# Patient Record
Sex: FEMALE | Race: WHITE | Employment: UNEMPLOYED | URBAN - METROPOLITAN AREA
[De-identification: names, ages, dates, MRNs, and addresses within clinical notes are randomized per-mention and may not be internally consistent; named-entity substitution may affect disease eponyms.]

---

## 2019-05-06 ENCOUNTER — HOSPITAL ENCOUNTER (OUTPATIENT)
Dept: GENERAL RADIOLOGY | Age: 54
Discharge: HOME | End: 2019-05-06
Attending: INTERNAL MEDICINE
Payer: MEDICARE

## 2019-05-06 DIAGNOSIS — M25.559 PAIN IN UNSPECIFIED HIP: ICD-10-CM

## 2019-05-06 DIAGNOSIS — R52 PAIN: ICD-10-CM

## 2019-05-06 PROCEDURE — 73630 X-RAY EXAM OF FOOT: CPT

## 2019-05-06 PROCEDURE — 73610 X-RAY EXAM OF ANKLE: CPT

## 2019-05-06 PROCEDURE — 73521 X-RAY EXAM HIPS BI 2 VIEWS: CPT

## 2019-05-06 PROCEDURE — 73562 X-RAY EXAM OF KNEE 3: CPT

## 2019-11-18 ENCOUNTER — HOSPITAL ENCOUNTER (OUTPATIENT)
Age: 54
Setting detail: OUTPATIENT SURGERY
Discharge: HOME | End: 2019-11-18
Attending: INTERNAL MEDICINE | Admitting: INTERNAL MEDICINE
Payer: MEDICARE

## 2019-11-18 ENCOUNTER — ANESTHESIA EVENT (OUTPATIENT)
Dept: ENDOSCOPY | Age: 54
End: 2019-11-18
Payer: MEDICARE

## 2019-11-18 ENCOUNTER — ANESTHESIA (OUTPATIENT)
Dept: ENDOSCOPY | Age: 54
End: 2019-11-18
Payer: MEDICARE

## 2019-11-18 VITALS
RESPIRATION RATE: 11 BRPM | DIASTOLIC BLOOD PRESSURE: 63 MMHG | HEIGHT: 64 IN | OXYGEN SATURATION: 100 % | BODY MASS INDEX: 17.77 KG/M2 | TEMPERATURE: 97.7 F | SYSTOLIC BLOOD PRESSURE: 109 MMHG | HEART RATE: 65 BPM | WEIGHT: 104.06 LBS

## 2019-11-18 PROCEDURE — 74011000250 HC RX REV CODE- 250: Performed by: ANESTHESIOLOGY

## 2019-11-18 PROCEDURE — 76040000019: Performed by: INTERNAL MEDICINE

## 2019-11-18 PROCEDURE — 74011250636 HC RX REV CODE- 250/636: Performed by: ANESTHESIOLOGY

## 2019-11-18 PROCEDURE — 74011250636 HC RX REV CODE- 250/636: Performed by: INTERNAL MEDICINE

## 2019-11-18 PROCEDURE — 76060000031 HC ANESTHESIA FIRST 0.5 HR: Performed by: INTERNAL MEDICINE

## 2019-11-18 RX ORDER — DIAZEPAM 10 MG/2ML
INJECTION INTRAMUSCULAR ONCE
COMMUNITY
End: 2020-06-08 | Stop reason: SDUPTHER

## 2019-11-18 RX ORDER — ACETAMINOPHEN 325 MG/1
325 TABLET ORAL
COMMUNITY
End: 2021-01-08 | Stop reason: SDUPTHER

## 2019-11-18 RX ORDER — SODIUM CHLORIDE 0.9 % (FLUSH) 0.9 %
5-40 SYRINGE (ML) INJECTION AS NEEDED
Status: DISCONTINUED | OUTPATIENT
Start: 2019-11-18 | End: 2019-11-18 | Stop reason: HOSPADM

## 2019-11-18 RX ORDER — EPINEPHRINE 0.1 MG/ML
1 INJECTION INTRACARDIAC; INTRAVENOUS
Status: DISCONTINUED | OUTPATIENT
Start: 2019-11-18 | End: 2019-11-18 | Stop reason: HOSPADM

## 2019-11-18 RX ORDER — DIAZEPAM 5 MG/1
5 TABLET ORAL
COMMUNITY

## 2019-11-18 RX ORDER — EPINEPHRINE 0.3 MG/.3ML
0.3 INJECTION SUBCUTANEOUS
COMMUNITY
End: 2021-01-08 | Stop reason: SDUPTHER

## 2019-11-18 RX ORDER — NALOXONE HYDROCHLORIDE 0.4 MG/ML
0.4 INJECTION, SOLUTION INTRAMUSCULAR; INTRAVENOUS; SUBCUTANEOUS
Status: DISCONTINUED | OUTPATIENT
Start: 2019-11-18 | End: 2019-11-18 | Stop reason: HOSPADM

## 2019-11-18 RX ORDER — PSYLLIUM HUSK 0.4 G
0.4 CAPSULE ORAL
COMMUNITY
End: 2021-03-23 | Stop reason: SDUPTHER

## 2019-11-18 RX ORDER — BISMUTH SUBSALICYLATE 262 MG
1 TABLET,CHEWABLE ORAL DAILY
COMMUNITY
End: 2021-01-08 | Stop reason: SDUPTHER

## 2019-11-18 RX ORDER — GABAPENTIN 600 MG/1
600 TABLET ORAL 3 TIMES DAILY
COMMUNITY

## 2019-11-18 RX ORDER — KETOCONAZOLE 20 MG/ML
SHAMPOO TOPICAL
COMMUNITY

## 2019-11-18 RX ORDER — DEXTROMETHORPHAN/PSEUDOEPHED 2.5-7.5/.8
1.2 DROPS ORAL
Status: DISCONTINUED | OUTPATIENT
Start: 2019-11-18 | End: 2019-11-18 | Stop reason: HOSPADM

## 2019-11-18 RX ORDER — LIDOCAINE HYDROCHLORIDE 20 MG/ML
INJECTION, SOLUTION EPIDURAL; INFILTRATION; INTRACAUDAL; PERINEURAL AS NEEDED
Status: DISCONTINUED | OUTPATIENT
Start: 2019-11-18 | End: 2019-11-18 | Stop reason: HOSPADM

## 2019-11-18 RX ORDER — POLYETHYLENE GLYCOL 3350 17 G/17G
17 POWDER, FOR SOLUTION ORAL DAILY
COMMUNITY

## 2019-11-18 RX ORDER — FLUTICASONE PROPIONATE 50 MCG
2 SPRAY, SUSPENSION (ML) NASAL DAILY
COMMUNITY
End: 2021-01-08 | Stop reason: SDUPTHER

## 2019-11-18 RX ORDER — ATROPINE SULFATE 0.1 MG/ML
0.5 INJECTION INTRAVENOUS
Status: DISCONTINUED | OUTPATIENT
Start: 2019-11-18 | End: 2019-11-18 | Stop reason: HOSPADM

## 2019-11-18 RX ORDER — HYDROXYZINE 25 MG/1
TABLET, FILM COATED ORAL
COMMUNITY

## 2019-11-18 RX ORDER — MIDODRINE HYDROCHLORIDE 10 MG/1
10 TABLET ORAL 2 TIMES DAILY WITH MEALS
COMMUNITY
End: 2019-12-20

## 2019-11-18 RX ORDER — SODIUM CHLORIDE 0.9 % (FLUSH) 0.9 %
5-40 SYRINGE (ML) INJECTION EVERY 8 HOURS
Status: DISCONTINUED | OUTPATIENT
Start: 2019-11-18 | End: 2019-11-18 | Stop reason: HOSPADM

## 2019-11-18 RX ORDER — LANOLIN ALCOHOL/MO/W.PET/CERES
3 CREAM (GRAM) TOPICAL
COMMUNITY
End: 2021-01-08 | Stop reason: SDUPTHER

## 2019-11-18 RX ORDER — DOCUSATE SODIUM 100 MG/1
100 CAPSULE, LIQUID FILLED ORAL
COMMUNITY

## 2019-11-18 RX ORDER — SODIUM CHLORIDE 9 MG/ML
25 INJECTION, SOLUTION INTRAVENOUS CONTINUOUS
Status: DISCONTINUED | OUTPATIENT
Start: 2019-11-18 | End: 2019-11-18 | Stop reason: HOSPADM

## 2019-11-18 RX ORDER — MAGNESIUM CITRATE
150 SOLUTION, ORAL ORAL AS NEEDED
COMMUNITY

## 2019-11-18 RX ORDER — DIAZEPAM 10 MG/2ML
10 GEL RECTAL AS NEEDED
COMMUNITY

## 2019-11-18 RX ORDER — PROPOFOL 10 MG/ML
INJECTION, EMULSION INTRAVENOUS AS NEEDED
Status: DISCONTINUED | OUTPATIENT
Start: 2019-11-18 | End: 2019-11-18 | Stop reason: HOSPADM

## 2019-11-18 RX ORDER — FACIAL-BODY WIPES
10 EACH TOPICAL
COMMUNITY
End: 2021-01-08 | Stop reason: SDUPTHER

## 2019-11-18 RX ORDER — FLUMAZENIL 0.1 MG/ML
0.2 INJECTION INTRAVENOUS
Status: DISCONTINUED | OUTPATIENT
Start: 2019-11-18 | End: 2019-11-18 | Stop reason: HOSPADM

## 2019-11-18 RX ORDER — HYDROCORTISONE 25 MG/G
OINTMENT TOPICAL 2 TIMES DAILY
COMMUNITY
End: 2021-01-08 | Stop reason: SDUPTHER

## 2019-11-18 RX ADMIN — PROPOFOL 60 MG: 10 INJECTION, EMULSION INTRAVENOUS at 10:42

## 2019-11-18 RX ADMIN — LIDOCAINE HYDROCHLORIDE 20 MG: 20 INJECTION, SOLUTION EPIDURAL; INFILTRATION; INTRACAUDAL; PERINEURAL at 10:30

## 2019-11-18 RX ADMIN — SODIUM CHLORIDE 25 ML/HR: 900 INJECTION, SOLUTION INTRAVENOUS at 10:15

## 2019-11-18 NOTE — ANESTHESIA POSTPROCEDURE EVALUATION
Procedure(s):  COLONOSCOPY.    total IV anesthesia    Anesthesia Post Evaluation        Patient location during evaluation: PACU  Note status: Adequate. Level of consciousness: responsive to verbal stimuli and sleepy but conscious  Pain management: satisfactory to patient  Airway patency: patent  Anesthetic complications: no  Cardiovascular status: acceptable  Respiratory status: acceptable  Hydration status: acceptable  Comments: +Post-Anesthesia Evaluation and Assessment    Patient: Ilda Hudson MRN: [id number]  SSN: [id number]   YOB: 1965  Age: 48 y.o. Sex: female      Cardiovascular Function/Vital Signs    /63   Pulse 65   Temp 36.5 °C (97.7 °F)   Resp 11   Ht 5' 4\" (1.626 m)   Wt 47.2 kg (104 lb 0.9 oz)   SpO2 100%   Breastfeeding? No   BMI 17.86 kg/m²     Patient is status post Procedure(s):  COLONOSCOPY. Nausea/Vomiting: Controlled. Postoperative hydration reviewed and adequate. Pain:  Pain Scale 1: Visual (11/18/19 1104)  Pain Intensity 1: 0 (11/18/19 1104)   Managed. Neurological Status: At baseline. Mental Status and Level of Consciousness: Arousable. Pulmonary Status:   O2 Device: Room air (11/18/19 1104)   Adequate oxygenation and airway patent. Complications related to anesthesia: None    Post-anesthesia assessment completed. No concerns. Signed By: Sarah Edwards DO    11/18/2019  Post anesthesia nausea and vomiting:  controlled      Vitals Value Taken Time   /63 11/18/2019 11:04 AM   Temp 36.5 °C (97.7 °F) 11/18/2019 10:59 AM   Pulse 0 11/18/2019 11:07 AM   Resp 0 11/18/2019 11:07 AM   SpO2 100 % 11/18/2019 11:06 AM   Vitals shown include unvalidated device data.

## 2019-11-18 NOTE — PROCEDURES
NAME:  Paulino Sanchez   :   1965   MRN:   [id number]     Date/Time:  2019 10:40 AM    Colonoscopy Operative Report    Procedure Type:  Colonoscopy --incomplete    Indications: change in bowel habits, constipation  Pre-operative Diagnosis: see indication above  Post-operative Diagnosis:  See findings below  :  Ashleigh Arevalo MD  Referring Provider: Fide Werner MD    Exam:  Airway: clear, no airway problems anticipated  Heart: RRR, without gallops or rubs  Lungs: clear bilaterally without wheezes, crackles, or rhonchi  Abdomen: soft, nontender, nondistended, bowel sounds present  Mental Status: awake, alert and oriented to person, place and time    Sedation:  MAC anesthesia Propofol  Procedure Details:  After informed consent was obtained with all risks and benefits of procedure explained and preoperative exam completed, the patient was taken to the endoscopy suite and placed in the left lateral decubitus position. Upon sequential sedation as per above, a digital rectal exam was performed demonstrating internal hemorrhoids. The Olympus videocolonoscope  was inserted in the rectum and carefully advanced to the rectum. The quality of preparation was poor. The colonoscope was slowly withdrawn with careful evaluation between folds. Retroflexion in the rectum was completed demonstrating internal hemorrhoids. Findings:   ANUS: Anal exam reveals no masses or hemorrhoids, sphincter tone is normal. Copious thick brown stool on glove and in diaper. RECTUM: thick brown and solid stool coating and within lumen of rectum. Procedure aborted. SIGMOID COLON: not examined    Specimen Removed:  none  Complications:  None. EBL:     None. Impression:  -- incomplete procedure, solid stool still present throughout. Recommendations:   -- repeat colonoscopy with extended bowel prep at next available    Discharge Disposition:  Home in the company of a  when able to ambulate.       Analy Swanson MD

## 2019-11-18 NOTE — H&P
Gastroenterology Outpatient History and Physical    Patient: Desmond Mcclain    Physician: Arin Robledo MD    Chief Complaint: change in bowel habits  History of Present Illness: 47 yo F with constipation, worsening for a few years. No prior colonoscopy. History:  Past Medical History:   Diagnosis Date    Constipation     Hypotension     Psychomotor retardation     severe    Seizure disorder ([facility]     History reviewed. No pertinent surgical history. Social History     Socioeconomic History    Marital status: UNKNOWN     Spouse name: Not on file    Number of children: Not on file    Years of education: Not on file    Highest education level: Not on file    History reviewed. No pertinent family history. Patient Active Problem List   Diagnosis Code    Seizure disorder ([facility] G40.909    Hypotension I95.9    Constipation K59.00    Psychomotor retardation F45.9       Allergies: Allergies   Allergen Reactions    Bee Sting [Sting, Bee] Anaphylaxis     Medications:   Prior to Admission medications    Medication Sig Start Date End Date Taking? Authorizing Provider   divalproex sodium (DIVALPROEX PO) Take 250 mg by mouth three (3) times daily (with meals). Yes Provider, Historical   gabapentin (NEURONTIN) 600 mg tablet Take 600 mg by mouth three (3) times daily. Indications: 2 tab by mouth   Yes Provider, Historical   midodrine (PROAMITINE) 10 mg tablet Take 10 mg by mouth two (2) times daily (with meals). Yes Provider, Historical   multivitamin (ONE A DAY) tablet Take 1 Tab by mouth daily. Yes Provider, Historical   hydrocortisone (HYTONE) 2.5 % ointment Apply  to affected area two (2) times a day. use thin layer   Indications: To face    Provider, Historical   polyethylene glycol (MIRALAX) 17 gram packet Take 17 g by mouth daily. Provider, Historical   psyllium husk (METAMUCIL) 0.4 gram cap Take 0.4 g by mouth.     Provider, Historical   acetaminophen (TYLENOL) 325 mg tablet Take 325 mg by mouth every six (6) hours as needed for Pain. Provider, Historical   bisacodyl (DULCOLAX) 10 mg suppository Insert 10 mg into rectum daily as needed. Provider, Historical   diazePAM (VALIUM) 5 mg/mL injection by IntraMUSCular route once. Provider, Historical   diazePAM (VALIUM) 5-7.5-10 mg kit Insert 10 mg into rectum as needed (As needed for seizure lasting 3 minutes or 2 within 30 minutes of each other). Provider, Historical   diazePAM (VALIUM) 5 mg tablet Take 5 mg by mouth every six (6) hours as needed for Anxiety (PRN for procedures). Provider, Historical   docusate sodium (COLACE) 100 mg capsule Take 100 mg by mouth two (2) times daily as needed for Constipation. Provider, Historical   EPINEPHrine (EPIPEN) 0.3 mg/0.3 mL injection 0.3 mg by IntraMUSCular route once as needed for Allergic Response. Provider, Historical   fluticasone propionate (FLONASE) 50 mcg/actuation nasal spray 2 Sprays by Both Nostrils route daily. Indications: inflammation of the nose due to an allergy    Provider, Historical   hydrOXYzine HCl (ATARAX) 25 mg tablet Take  by mouth every six (6) hours as needed for Itching. Provider, Historical   ketoconazole (NIZORAL) 2 % shampoo Apply  to affected area three (3) days a week. Provider, Historical   magnesium citrate solution Take 150 mL by mouth as needed. Provider, Historical   melatonin 3 mg tablet Take 3 mg by mouth nightly as needed. Provider, Historical   dextromethorphan HBr (ROBAFEN COUGH PO) Take 5 mL by mouth. Indications: for cough    Provider, Historical     Physical Exam:   Vital Signs: Blood pressure 98/68, pulse 65, temperature 97 °F (36.1 °C), resp. rate 11, height 5' 4\" (1.626 m), weight 47.2 kg (104 lb 0.9 oz), SpO2 98 %, not currently breastfeeding.   General: well developed, well nourished   HEENT: unremarkable   Heart: regular rhythm no mumur    Lungs: clear   Abdominal:  benign   Neurological: unremarkable   Extremities: no edema Findings/Diagnosis: change in bowel habits, constipation  Plan of Care/Planned Procedure: Colonoscopy with conscious/deep sedation    Signed:  Etsefany Sterling MD 11/18/2019

## 2019-11-18 NOTE — DISCHARGE INSTRUCTIONS
Ilda Hudson  [id number]  1965    COLON DISCHARGE INSTRUCTIONS  Discomfort:  Redness at IV site- apply warm compress to area; if redness or soreness persist- contact your physician  There may be a slight amount of blood passed from the rectum  Gaseous discomfort- walking, belching will help relieve any discomfort  You may not operate a vehicle for 12 hours  You may not engage in an occupation involving machinery or appliances for rest of today  You may not drink alcoholic beverages for at least 12 hours  Avoid making any critical decisions for at least 24 hour  DIET:   High fiber diet. - however -  remember your colon is empty and a heavy meal will produce gas. Avoid these foods:  vegetables, fried / greasy foods, carbonated drinks for today  MEDICATION:  (See attached)     ACTIVITY:  You may not resume your normal daily activities until tomorrow AM; it is recommended that you spend the remainder of the day resting -  avoid any strenuous activity. CALL M.D. ANY SIGN OF:   Increasing pain, nausea, vomiting  Abdominal distension (swelling)  New increased bleeding (oral or rectal)  Fever (chills)    IMPRESSION:  -- unfortunately, we could not complete the testing, as the bowel prep was very poor. -- There was solid stool throughout the rectum, making any visualization impossible.   -- we'll have to try again with an extended, multi-day bowel prep    Follow-up Instructions:   Call Dr. Salvatore Kim Telephone # [phone]  Repeat colonoscopy at next available time    Lorena Rios MD

## 2019-11-18 NOTE — ANESTHESIA PREPROCEDURE EVALUATION
Relevant Problems   No relevant active problems       Anesthetic History   No history of anesthetic complications            Review of Systems / Medical History  Patient summary reviewed, nursing notes reviewed and pertinent labs reviewed    Pulmonary  Within defined limits                 Neuro/Psych     seizures: well controlled         Cardiovascular  Within defined limits                Exercise tolerance: <4 METS  Comments: Unable to walk without assistance, nonverbal, does not follow commands   GI/Hepatic/Renal  Within defined limits              Endo/Other  Within defined limits           Other Findings   Comments: Psychomotor retardation          Physical Exam    Airway    TM Distance: 4 - 6 cm        Comments: Pt has MR, unable to follow commands, nonverbal.  Could not evaluate airway Cardiovascular  Regular rate and rhythm,  S1 and S2 normal,  no murmur, click, rub, or gallop             Dental      Comments: Caregiver reports no loose teeth, dentures or partials   Pulmonary      Decreased breath sounds: bilateral          Comments: Low lung volumes Abdominal  GI exam deferred       Other Findings            Anesthetic Plan    ASA: 3  Anesthesia type: total IV anesthesia          Induction: Intravenous  Anesthetic plan and risks discussed with: Patient

## 2019-11-18 NOTE — PROGRESS NOTES
Anesthesia reports 60mg Propofol, 20mg Lidocaine and 300mL NS given during procedure. Received report from anesthesia staff on vital signs and status of patient.

## 2020-03-20 ENCOUNTER — HOSPITAL ENCOUNTER (EMERGENCY)
Age: 55
Discharge: HOME | End: 2020-03-20
Attending: EMERGENCY MEDICINE
Payer: MEDICARE

## 2020-03-20 VITALS
HEART RATE: 99 BPM | SYSTOLIC BLOOD PRESSURE: 99 MMHG | OXYGEN SATURATION: 98 % | WEIGHT: 106.26 LBS | TEMPERATURE: 97.9 F | BODY MASS INDEX: 18.24 KG/M2 | RESPIRATION RATE: 20 BRPM | DIASTOLIC BLOOD PRESSURE: 82 MMHG

## 2020-03-20 DIAGNOSIS — S01.81XA CHIN LACERATION, INITIAL ENCOUNTER: Primary | ICD-10-CM

## 2020-03-20 PROCEDURE — 74011250637 HC RX REV CODE- 250/637: Performed by: PHYSICIAN ASSISTANT

## 2020-03-20 PROCEDURE — 75810000293 HC SIMP/SUPERF WND  RPR

## 2020-03-20 PROCEDURE — 99283 EMERGENCY DEPT VISIT LOW MDM: CPT

## 2020-03-20 RX ORDER — ACETAMINOPHEN 325 MG/1
650 TABLET ORAL
Status: COMPLETED | OUTPATIENT
Start: 2020-03-20 | End: 2020-03-20

## 2020-03-20 RX ADMIN — ACETAMINOPHEN 650 MG: 325 TABLET ORAL at 13:46

## 2020-03-20 NOTE — ED NOTES
The patient was discharged home by Dr Iris Zuleta in stable condition. The patient is alert, in no respiratory distress. The patient's diagnosis, condition and treatment were explained. The patient expressed understanding. A discharge plan has been developed. Aftercare instructions were given.  Pt discharged from the ED via w/c by home care staff member

## 2020-03-20 NOTE — ED PROVIDER NOTES
Pt is a 51 y/o female presenting with a laceration on her chin following a witnessed ground-level fall from seated in a chair. She fell between 6:00-7:00 this morning. Pt has psychomotor retardation and a caregiver is present to assist with pt history. Caregiver states that the pt falls frequently due to difficulty with coordinated movement. Denies any recent illness or general change from baseline. Bleeding was controlled fairly quickly. Caregiver states the pt is expressing pain at this time but level is not able to assessed. Past Medical History:   Diagnosis Date    Constipation     Hypotension     Psychomotor retardation     severe    Seizure disorder [facility])        Past Surgical History:   Procedure Laterality Date    COLONOSCOPY N/A 11/18/2019    COLONOSCOPY performed by Baltazar Mayers MD at [facility] ENDOSCOPY    [facility]  11/18/2019              No family history on file.     Social History     Socioeconomic History    Marital status: UNKNOWN     Spouse name: Not on file    Number of children: Not on file    Years of education: Not on file    Highest education level: Not on file   Occupational History    Not on file   Social Needs    Financial resource strain: Not on file    Food insecurity     Worry: Not on file     Inability: Not on file    Transportation needs     Medical: Not on file     Non-medical: Not on file   Tobacco Use    Smoking status: Never Smoker    Smokeless tobacco: Never Used   Substance and Sexual Activity    Alcohol use: Never     Frequency: Never    Drug use: Never    Sexual activity: Not on file   Lifestyle    Physical activity     Days per week: Not on file     Minutes per session: Not on file    Stress: Not on file   Relationships    Social connections     Talks on phone: Not on file     Gets together: Not on file     Attends [employer] service: Not on file     Active member of club or organization: Not on file     Attends meetings of clubs or organizations: Not on file     Relationship status: Not on file    Intimate partner violence     Fear of current or ex partner: Not on file     Emotionally abused: Not on file     Physically abused: Not on file     Forced sexual activity: Not on file   Other Topics Concern    Not on file   Social History Narrative    Not on file         ALLERGIES: Bee sting [sting, bee]    Review of Systems   Unable to perform ROS: Patient nonverbal       Vitals:    03/20/20 1311   BP: 99/82   Pulse: 99   Resp: 20   Temp: 97.9 °F (36.6 °C)   SpO2: 98%   Weight: 48.2 kg (106 lb 4.2 oz)            Physical Exam  Vitals signs and nursing note reviewed. Constitutional:       General: She is not in acute distress. Appearance: She is not ill-appearing. HENT:      Head: Normocephalic. Nose: No rhinorrhea. Mouth/Throat:      Mouth: Mucous membranes are moist.      Pharynx: Oropharynx is clear. No posterior oropharyngeal erythema. Eyes:      Pupils: Pupils are equal, round, and reactive to light. Neck:      Musculoskeletal: Normal range of motion. Cardiovascular:      Rate and Rhythm: Normal rate and regular rhythm. Heart sounds: Normal heart sounds. Pulmonary:      Effort: Pulmonary effort is normal.      Breath sounds: Normal breath sounds. No wheezing. Abdominal:      General: There is no distension. Palpations: Abdomen is soft. Skin:     General: Skin is warm. Capillary Refill: Capillary refill takes less than 2 seconds. Findings: Signs of injury present. No erythema or rash. Neurological:      Mental Status: She is alert. Psychiatric:         Behavior: Behavior is cooperative.           MDM  Number of Diagnoses or Management Options  Chin laceration, initial encounter: new and does not require workup  Patient Progress  Patient progress: stable     Pt with simple chin laceration following a witnessed ground-level fall at healthcare facility which are reported to happen frequently. Behavior before and after falling is at baseline according to caregiver. No focal neurologic findings. Laceration closed with adhesive. Discussed with caregiver the indications for further treatment such as signs of infection, poor healing. Instructed to followup with PCP for wound check in 1 week. Wound Closure by Adhesive  Date/Time: 3/20/2020 4:25 PM  Performed by: Jeimy Mai PA-C  Authorized by: Jeimy Mai PA-C     Consent:     Consent obtained:  Verbal (by caregiver)    Consent given by:  Healthcare agent    Risks discussed:  Infection, pain, poor cosmetic result and poor wound healing  Anesthesia (see MAR for exact dosages): Anesthesia method:  None  Laceration details:     Location:  Face    Face location:  Chin    Length (cm):  2    Depth (mm):  4  Repair type:     Repair type:  Simple  Exploration:     Hemostasis achieved with:  Direct pressure    Wound exploration: entire depth of wound probed and visualized      Contaminated: no    Treatment:     Area cleansed with:  Saline    Amount of cleaning:  Standard    Irrigation solution:  Sterile saline    Irrigation volume:  200    Irrigation method:  Pressure wash  Skin repair:     Repair method:  Tissue adhesive  Approximation:     Approximation:  Close  Post-procedure details:     Dressing:  Open (no dressing)    Patient tolerance of procedure:   Tolerated well, no immediate complications          Ankush Hicks PA-C  3/20/2020

## 2020-03-20 NOTE — DISCHARGE INSTRUCTIONS
Patient Education        Cuts Closed With Adhesives: Care Instructions  Your Care Instructions  A cut can happen anywhere on your body. The doctor used an adhesive to close the cut. When the adhesive dries, it forms a film that holds the edges of the cut together. Skin adhesives are sometimes called liquid stitches. If the cut went deep and through the skin, the doctor may have put in a layer of stitches below the adhesive. The deeper layer of stitches brings the deep part of the cut together. These stitches will dissolve and don't need to be removed. You don't see the stitches, only the adhesive. You may have a bandage. The doctor has checked you carefully, but problems can develop later. If you notice any problems or new symptoms, get medical treatment right away. Follow-up care is a key part of your treatment and safety. Be sure to make and go to all appointments, and call your doctor if you are having problems. It's also a good idea to know your test results and keep a list of the medicines you take. How can you care for yourself at home? · Keep the cut dry for the first 24 to 48 hours. After this, you can shower if your doctor okays it. Pat the cut dry. · Don't soak the cut, such as in a bathtub. Your doctor will tell you when it's safe to get the cut wet. · If your doctor told you how to care for your cut, follow your doctor's instructions. If you did not get instructions, follow this general advice:  ? Do not put any kind of ointment, cream, or lotion over the area. This can make the adhesive fall off too soon. ? After the first 24 to 48 hours, wash around the cut with clean water 2 times a day. Do not use hydrogen peroxide or alcohol, which can slow healing. ? If the doctor told you to use a bandage, put on a new bandage after cleaning the cut or if the bandage gets wet or dirty. · Prop up the sore area on a pillow anytime you sit or lie down during the next 3 days.  Try to keep it above the level of your heart. This will help reduce swelling. · Leave the skin adhesive on your skin until it falls off on its own. This may take 5 to 10 days. · Do not scratch, rub, or pick at the adhesive. · Do not put the sticky part of a bandage directly on the adhesive. · Avoid any activity that could cause your cut to reopen. · Be safe with medicines. Read and follow all instructions on the label. ? If the doctor gave you a prescription medicine for pain, take it as prescribed. ? If you are not taking a prescription pain medicine, ask your doctor if you can take an over-the-counter medicine. When should you call for help? Call your doctor now or seek immediate medical care if:    · You have new pain, or your pain gets worse.     · The skin near the cut is cold or pale or changes color.     · You have tingling, weakness, or numbness near the cut.     · The cut starts to bleed.     · You have trouble moving the area near the cut.     · You have symptoms of infection, such as:  ? Increased pain, swelling, warmth, or redness around the cut.  ? Red streaks leading from the cut.  ? Pus draining from the cut.  ? A fever.    Watch closely for changes in your health, and be sure to contact your doctor if:    · The cut reopens.     · You do not get better as expected. Where can you learn more? Go to http://viridiana-pallavi.info/  Enter P174 in the search box to learn more about \"Cuts Closed With Adhesives: Care Instructions. \"  Current as of: June 26, 2019Content Version: 12.4  © 7798-6353 [employer]. Care instructions adapted under license by [employer] (which disclaims liability or warranty for this information). If you have questions about a medical condition or this instruction, always ask your healthcare professional. [address] any warranty or liability for your use of this information.

## 2021-02-16 ENCOUNTER — HOSPITAL ENCOUNTER (OUTPATIENT)
Dept: GENERAL RADIOLOGY | Age: 56
Discharge: HOME | End: 2021-02-16
Attending: INTERNAL MEDICINE
Payer: MEDICARE

## 2021-02-16 DIAGNOSIS — R13.10 DYSPHAGIA, UNSPECIFIED TYPE: ICD-10-CM

## 2021-02-16 DIAGNOSIS — R13.19 OTHER DYSPHAGIA: ICD-10-CM

## 2021-02-16 PROCEDURE — 74230 X-RAY XM SWLNG FUNCJ C+: CPT

## 2021-02-16 PROCEDURE — 92611 MOTION FLUOROSCOPY/SWALLOW: CPT | Performed by: SPEECH-LANGUAGE PATHOLOGIST

## 2021-02-16 NOTE — PROGRESS NOTES
[address] STUDY  Patient: Gilbert Chaney (42 y.o. female)  Date: 2/16/2021  Referring Provider:  Dr. Sarahi Benson:   Patient alert, cooperative. Brought by caregiver from her group home who reports she eats rapidly. Is on a mechanical soft diet and facility is trying to decide what is the safest texture for her to be on. Does not specifically note coughing with intake. OBJECTIVE:   Past Medical History:   Past Medical History:   Diagnosis Date    Constipation     Hypotension     Psychomotor retardation     severe    Seizure disorder [facility])      Past Surgical History:   Procedure Laterality Date    COLONOSCOPY N/A 11/18/2019    COLONOSCOPY performed by Gladys Singh MD at [facility] ENDOSCOPY    [facility]  11/18/2019          Current Dietary Status:  [facility] soft/thin   Radiologist: Dr. Harrison Natter: Lateral;Fluoro  Patient Position: upright in hausted chair     Trial 1: Trial 2:   Consistency Presented: Thin liquid; Nectar thick liquid Consistency Presented: Honey thick liquid;Puree; Solid   How Presented: Self-fed/presented;Cup/gulp; Successive swallows How Presented: Self-fed/presented;SLP-fed/presented;Cup/gulp; Successive swallows;Spoon   Consistency Amount: (90cc thin Ba, 30cc nectar thick Ba) Consistency Amount: (150cc honey thick Ba, 1 tbsp Ba paste )   Bolus Acceptance: Impaired(significantly impulsive) Bolus Acceptance: Impaired(severely impulsive)   Bolus Formation/Control: Impaired: Anterior;Spillage;Premature spillage;Piecemeal Bolus Formation/Control: Impaired: Incomplete;Mastication;Delayed; Anterior;Spillage;Premature spillage   Propulsion: Delayed (# of seconds); Discoordination Propulsion: Delayed (# of seconds); Discoordination   Oral Residue: Lingual Oral Residue: Lingual   Initiation of Swallow: Triggered at pyriform sinus(es) Initiation of Swallow: Triggered at valleculae;Triggered at pyriform sinus(es)   Timing: Pooling 1-5 sec Timing: Pooling 1-5 sec   Penetration: Before swallow;During swallow; To cords Penetration: None   Aspiration/Timing: Silent ;Repeated;During;From initial swallow(large, reji amount ) Aspiration/Timing: No evidence of aspiration   Pharyngeal Clearance: Vallecular residue;Pyriform residue ; Less than 10% Pharyngeal Clearance: Vallecular residue;Pyriform residue ; Less than 10%   Attempted Modifications: Small sips and bites Attempted Modifications: Small sips and bites   Effective Modifications: None Effective Modifications: (small bites/sips improved safety )   Cues for Modifications: Maximal Cues for Modifications: Maximum         Decreased Tongue Base Retraction?: Yes  Laryngeal Elevation: Incomplete laryngeal closure; Inadequate epiglottic inversion; Reduced excursion with laryngeal vestibule gap  Aspiration/Penetration Score: 8 (Aspiration-Contrast passes cords/glottis with no effort to eject, ie/silent aspiration)  Pharyngeal Symmetry: Not assessed  Pharyngeal-Esophageal Segment: No impairment  Pharyngeal Dysfunction: Decreased tongue base retraction;Decreased strength;Decreased elevation/closure    Oral Phase Severity: Moderate  Pharyngeal Phase Severity: Moderate    ASSESSMENT :  Based on the objective data described above, the patient presents with moderate oral and pharyngeal dysphagia that is further impacted by severe impulsivity with patient taking massive sips of liquids and needing the cup pulled out of her mouth/hands to slow rate. Oral phase characterized by incomplete mastication with incomplete breakdown of solid bolus, moderate oral residue with all consistencies that cleared eventually with an extra swallow, anterior spillage, and decreased bolus formation and control with premature spillage of all consistencies.  Pharyngeal dysphagia characterized by decreased pharyngeal strength and sensation with delayed swallow initiation (variable at the valleculae and pyriforms but mostly pyriforms), decreased laryngeal closure with reji, large volume of silent aspiration occurring during the swallow with both thin and nectar thick liquids related to poor laryngeal closure and large bolus size, and mild pharyngeal residue with all consistencies. There was no penetration or aspiration with purees, solids or honey thickened liquids, however, increased risk of choking with solids due to impulsivity and incomplete mastication. Overall, she benefits from smaller bites/sips to improve safety with intake. Providing a single sip worth of honey thick liquids in her cup at a time and refilling after each sip was the most effective strategy during study. PLAN/RECOMMENDATIONS :  --recommend puree/honey (moderately) thick liquids with strict aspiration precautions. --recommend pour one sip worth of honey thick liquids in her cup at a time to allow her the independence to self feed, but ability to slow her rate without having to pull the cup out of her mouth every sip which can cause agitation/frustration. --may need to consider putting small amounts of purees on her plate at a time to prevent her from being as impulsive with meals as well  --would recommend consideration of home health SLP assessment to be able to assess her in a meal setting and provide additional strategies and education to caregivers regarding most appropriate strategies for safety. Do not anticipate she would make any progress in regards to swallowing function due to her mental status and inability to follow instructions, however, hopeful that with changes to meal set-up that she can be successful with PO intake and caregivers would benefit from skilled SLP to help implement safe swallowing strategies at meals. If HH is unavailable at group home, could consider OP as well.        COMMUNICATION/EDUCATION:   The above findings and recommendations were discussed with: caregiver who verbalized understanding. Thank you for this referral.  Yanely Dennison M.CD.  CCC-SLP   Time Calculation: 30 mins

## 2022-08-26 ENCOUNTER — HOSPITAL ENCOUNTER (EMERGENCY)
Age: 57
Discharge: HOME | End: 2022-08-26
Attending: EMERGENCY MEDICINE
Payer: MEDICARE

## 2022-08-26 VITALS
SYSTOLIC BLOOD PRESSURE: 98 MMHG | OXYGEN SATURATION: 97 % | HEART RATE: 92 BPM | DIASTOLIC BLOOD PRESSURE: 73 MMHG | RESPIRATION RATE: 18 BRPM | TEMPERATURE: 96.8 F

## 2022-08-26 DIAGNOSIS — L03.213 PRESEPTAL CELLULITIS OF LEFT EYE: ICD-10-CM

## 2022-08-26 DIAGNOSIS — H10.32 ACUTE CONJUNCTIVITIS OF LEFT EYE, UNSPECIFIED ACUTE CONJUNCTIVITIS TYPE: Primary | ICD-10-CM

## 2022-08-26 PROCEDURE — 99283 EMERGENCY DEPT VISIT LOW MDM: CPT

## 2022-08-26 PROCEDURE — 74011250637 HC RX REV CODE- 250/637: Performed by: EMERGENCY MEDICINE

## 2022-08-26 RX ORDER — CLINDAMYCIN HYDROCHLORIDE 300 MG/1
300 CAPSULE ORAL 3 TIMES DAILY
Qty: 21 CAPSULE | Refills: 0 | Status: SHIPPED | OUTPATIENT
Start: 2022-08-26 | End: 2022-09-02

## 2022-08-26 RX ORDER — ERYTHROMYCIN 5 MG/G
OINTMENT OPHTHALMIC
Status: COMPLETED | OUTPATIENT
Start: 2022-08-26 | End: 2022-08-26

## 2022-08-26 RX ORDER — ERYTHROMYCIN 5 MG/G
1 OINTMENT OPHTHALMIC EVERY 6 HOURS
Qty: 3.5 G | Refills: 0 | Status: SHIPPED | OUTPATIENT
Start: 2022-08-26 | End: 2022-09-02

## 2022-08-26 RX ADMIN — ERYTHROMYCIN: 5 OINTMENT OPHTHALMIC at 17:21

## 2022-08-26 NOTE — ED TRIAGE NOTES
Pt arrives from group home for concern of stroke symptoms, unsure of last known well. Staff thought pt was leaning to left side. Pt is non-verbal at baseline, blood sugar 126 per ems, vss. Some redness noted to left side of face as pt is contracted and lies on that side of her body.

## 2022-08-27 NOTE — ED PROVIDER NOTES
53-year-old female with congenital psychomotor retardation, seizures, chronic hypotension, who is bedbound and nonverbal at baseline presents from her group home with staff member reporting redness in her left eye and face x1 day. She states that she seems to be laying more on her left side and does not want to roll over as much as she normally does. No trauma to the area. No contacts or other things in her eye or rubbing her eye. No fever or other vital sign abnormalities. Staff members state that they called an emergency line and were told that she may have had something as severe as a stroke or seizure although no seizure activity was noted and uncertain how stroke would cause redness in her eye. She is afebrile with vital signs stable with blood sugar 126 by EMS. On arrival she is bedbound and contracted curling up in a ball and resistant to other positioning laying on her left side. Skin Problem        Past Medical History:   Diagnosis Date    Constipation     Hypotension     Psychomotor retardation     severe    Seizure disorder [facility])        Past Surgical History:   Procedure Laterality Date    COLONOSCOPY N/A 11/18/2019    COLONOSCOPY performed by Pilar Sandy MD at [facility]  11/18/2019              No family history on file.     Social History     Socioeconomic History    Marital status: UNKNOWN     Spouse name: Not on file    Number of children: Not on file    Years of education: Not on file    Highest education level: Not on file   Occupational History    Not on file   Tobacco Use    Smoking status: Never    Smokeless tobacco: Never   Substance and Sexual Activity    Alcohol use: Never    Drug use: Never    Sexual activity: Not on file   Other Topics Concern    Not on file   Social History Narrative    Not on file     Social Determinants of Health     Financial Resource Strain: Not on file   Food Insecurity: Not on file   Transportation Needs: Not on file Physical Activity: Not on file   Stress: Not on file   Social Connections: Not on file   Intimate Partner Violence: Not on file   Housing Stability: Not on file         ALLERGIES: Sting, bee and Methsuximide    Review of Systems   Unable to perform ROS: Patient nonverbal     Vitals:    08/26/22 1653   BP: 98/73   Pulse: 92   Resp: 18   Temp: 96.8 °F (36 °C)   SpO2: 97%            Physical Exam  Vitals and nursing note reviewed. Constitutional:       General: She is not in acute distress. Appearance: Normal appearance. She is well-developed. She is not diaphoretic. Comments: Chronically ill-appearing, curled up in ball laying on left side with contractures of extremities. Patient is resistant to examination secondary to congenital abnormalities. HENT:      Head: Normocephalic and atraumatic. Nose: Nose normal.   Eyes:      General: Lids are normal. Lids are everted, no foreign bodies appreciated. Left eye: Discharge present. Extraocular Movements: Extraocular movements intact. Conjunctiva/sclera:      Left eye: Left conjunctiva is injected. No hemorrhage. Pupils: Pupils are equal, round, and reactive to light. Slit lamp exam:     Left eye: No hyphema or hypopyon. Cardiovascular:      Rate and Rhythm: Normal rate and regular rhythm. Heart sounds: Normal heart sounds. Pulmonary:      Effort: Pulmonary effort is normal.      Breath sounds: Normal breath sounds. Abdominal:      General: There is no distension. Palpations: Abdomen is soft. Tenderness: There is no abdominal tenderness. Musculoskeletal:         General: No tenderness. Cervical back: Neck supple. Skin:     General: Skin is warm and dry. Neurological:      General: No focal deficit present. Mental Status: She is alert. Mental status is at baseline. GCS: GCS eye subscore is 4. GCS verbal subscore is 1. GCS motor subscore is 5.       Cranial Nerves: No cranial nerve deficit. Sensory: No sensory deficit. Motor: No weakness. Coordination: Coordination normal.      Comments: Moving all 4 extremities equally resisting examination with good strength bilaterally and intact sensation. MDM    62-year-old female With conjunctivitis and preseptal cellulitis. Symptoms duration x1 day. She is afebrile with vital signs stable. Will treat with erythromycin ointment and p.o. clindamycin and recommended PCP follow-up for further evaluation. Return precautions were given for worsening or concerns. This plan was discussed with patient's caregiver at the bedside and they stated both understanding and agreement. Please note that this dictation was completed with [employer], the [employer] voice recognition software. Quite often unanticipated grammatical, syntax, homophones, and other interpretive errors are inadvertently transcribed by the computer software. Please disregard these errors. Please excuse any errors that have escaped final proofreading.       Procedures

## 2022-12-05 ENCOUNTER — HOSPITAL ENCOUNTER (INPATIENT)
Age: 57
LOS: 1 days | Discharge: ANOTHER HOSPITAL | DRG: 871 | End: 2022-12-06
Attending: EMERGENCY MEDICINE | Admitting: HOSPITALIST
Payer: MEDICARE

## 2022-12-05 ENCOUNTER — APPOINTMENT (OUTPATIENT)
Dept: GENERAL RADIOLOGY | Age: 57
DRG: 871 | End: 2022-12-05
Attending: EMERGENCY MEDICINE
Payer: MEDICARE

## 2022-12-05 ENCOUNTER — APPOINTMENT (OUTPATIENT)
Dept: CT IMAGING | Age: 57
DRG: 871 | End: 2022-12-05
Attending: EMERGENCY MEDICINE
Payer: MEDICARE

## 2022-12-05 DIAGNOSIS — J18.9 COMMUNITY ACQUIRED PNEUMONIA, UNSPECIFIED LATERALITY: Primary | ICD-10-CM

## 2022-12-05 DIAGNOSIS — E86.0 DEHYDRATION: ICD-10-CM

## 2022-12-05 LAB
ALBUMIN SERPL-MCNC: 2.9 G/DL (ref 3.5–5)
ALBUMIN/GLOB SERPL: 0.6 {RATIO} (ref 1.1–2.2)
ALP SERPL-CCNC: 128 U/L (ref 45–117)
ALT SERPL-CCNC: 40 U/L (ref 12–78)
ANION GAP SERPL CALC-SCNC: 7 MMOL/L (ref 5–15)
APPEARANCE UR: CLEAR
AST SERPL-CCNC: 43 U/L (ref 15–37)
BACTERIA URNS QL MICRO: NEGATIVE /HPF
BASOPHILS # BLD: 0 K/UL (ref 0–0.1)
BASOPHILS NFR BLD: 0 % (ref 0–1)
BILIRUB SERPL-MCNC: 0.1 MG/DL (ref 0.2–1)
BILIRUB UR QL: NEGATIVE
BUN SERPL-MCNC: 36 MG/DL (ref 6–20)
BUN/CREAT SERPL: 30 (ref 12–20)
CALCIUM SERPL-MCNC: 9.8 MG/DL (ref 8.5–10.1)
CHLORIDE SERPL-SCNC: 101 MMOL/L (ref 97–108)
CO2 SERPL-SCNC: 33 MMOL/L (ref 21–32)
COLOR UR: ABNORMAL
CREAT SERPL-MCNC: 1.22 MG/DL (ref 0.55–1.02)
DIFFERENTIAL METHOD BLD: ABNORMAL
EOSINOPHIL # BLD: 0.1 K/UL (ref 0–0.4)
EOSINOPHIL NFR BLD: 1 % (ref 0–7)
EPITH CASTS URNS QL MICRO: ABNORMAL /LPF
ERYTHROCYTE [DISTWIDTH] IN BLOOD BY AUTOMATED COUNT: 16.4 % (ref 11.5–14.5)
GLOBULIN SER CALC-MCNC: 4.8 G/DL (ref 2–4)
GLUCOSE SERPL-MCNC: 82 MG/DL (ref 65–100)
GLUCOSE UR STRIP.AUTO-MCNC: NEGATIVE MG/DL
HCT VFR BLD AUTO: 32.8 % (ref 35–47)
HGB BLD-MCNC: 11 G/DL (ref 11.5–16)
HGB UR QL STRIP: NEGATIVE
IMM GRANULOCYTES # BLD AUTO: 0 K/UL
IMM GRANULOCYTES NFR BLD AUTO: 0 %
KETONES UR QL STRIP.AUTO: 15 MG/DL
LEUKOCYTE ESTERASE UR QL STRIP.AUTO: ABNORMAL
LYMPHOCYTES # BLD: 2.4 K/UL (ref 0.8–3.5)
LYMPHOCYTES NFR BLD: 20 % (ref 12–49)
MCH RBC QN AUTO: 32.2 PG (ref 26–34)
MCHC RBC AUTO-ENTMCNC: 33.5 G/DL (ref 30–36.5)
MCV RBC AUTO: 95.9 FL (ref 80–99)
MONOCYTES # BLD: 0.9 K/UL (ref 0–1)
MONOCYTES NFR BLD: 7 % (ref 5–13)
NEUTS BAND NFR BLD MANUAL: 11 % (ref 0–6)
NEUTS SEG # BLD: 8.8 K/UL (ref 1.8–8)
NEUTS SEG NFR BLD: 61 % (ref 32–75)
NITRITE UR QL STRIP.AUTO: NEGATIVE
NRBC # BLD: 0 K/UL (ref 0–0.01)
NRBC BLD-RTO: 0 PER 100 WBC
PH UR STRIP: 5.5 [PH] (ref 5–8)
PLATELET # BLD AUTO: 223 K/UL (ref 150–400)
PMV BLD AUTO: 12.1 FL (ref 8.9–12.9)
POTASSIUM SERPL-SCNC: 4.5 MMOL/L (ref 3.5–5.1)
PROT SERPL-MCNC: 7.7 G/DL (ref 6.4–8.2)
PROT UR STRIP-MCNC: 30 MG/DL
RBC # BLD AUTO: 3.42 M/UL (ref 3.8–5.2)
RBC #/AREA URNS HPF: ABNORMAL /HPF (ref 0–5)
RBC MORPH BLD: ABNORMAL
SODIUM SERPL-SCNC: 141 MMOL/L (ref 136–145)
SP GR UR REFRACTOMETRY: 1.03 (ref 1–1.03)
TROPONIN-HIGH SENSITIVITY: 7 NG/L (ref 0–51)
UA: UC IF INDICATED,UAUC: ABNORMAL
UROBILINOGEN UR QL STRIP.AUTO: 1 EU/DL (ref 0.2–1)
VALPROATE SERPL-MCNC: 36 UG/ML (ref 50–100)
WBC # BLD AUTO: 12.2 K/UL (ref 3.6–11)
WBC MORPH BLD: ABNORMAL
WBC URNS QL MICRO: ABNORMAL /HPF (ref 0–4)

## 2022-12-05 PROCEDURE — 84484 ASSAY OF TROPONIN QUANT: CPT

## 2022-12-05 PROCEDURE — 65270000029 HC RM PRIVATE

## 2022-12-05 PROCEDURE — 71250 CT THORAX DX C-: CPT

## 2022-12-05 PROCEDURE — 87040 BLOOD CULTURE FOR BACTERIA: CPT

## 2022-12-05 PROCEDURE — 82803 BLOOD GASES ANY COMBINATION: CPT

## 2022-12-05 PROCEDURE — 81001 URINALYSIS AUTO W/SCOPE: CPT

## 2022-12-05 PROCEDURE — 70450 CT HEAD/BRAIN W/O DYE: CPT

## 2022-12-05 PROCEDURE — 36415 COLL VENOUS BLD VENIPUNCTURE: CPT

## 2022-12-05 PROCEDURE — 74011000258 HC RX REV CODE- 258: Performed by: EMERGENCY MEDICINE

## 2022-12-05 PROCEDURE — 80053 COMPREHEN METABOLIC PANEL: CPT

## 2022-12-05 PROCEDURE — 74011250636 HC RX REV CODE- 250/636: Performed by: EMERGENCY MEDICINE

## 2022-12-05 PROCEDURE — 71045 X-RAY EXAM CHEST 1 VIEW: CPT

## 2022-12-05 PROCEDURE — 96361 HYDRATE IV INFUSION ADD-ON: CPT

## 2022-12-05 PROCEDURE — 96375 TX/PRO/DX INJ NEW DRUG ADDON: CPT

## 2022-12-05 PROCEDURE — 85025 COMPLETE CBC W/AUTO DIFF WBC: CPT

## 2022-12-05 PROCEDURE — 74011000250 HC RX REV CODE- 250: Performed by: EMERGENCY MEDICINE

## 2022-12-05 PROCEDURE — 99285 EMERGENCY DEPT VISIT HI MDM: CPT

## 2022-12-05 PROCEDURE — 96365 THER/PROPH/DIAG IV INF INIT: CPT

## 2022-12-05 PROCEDURE — 93005 ELECTROCARDIOGRAM TRACING: CPT

## 2022-12-05 PROCEDURE — 80164 ASSAY DIPROPYLACETIC ACD TOT: CPT

## 2022-12-05 PROCEDURE — 96376 TX/PRO/DX INJ SAME DRUG ADON: CPT

## 2022-12-05 RX ORDER — ACETAMINOPHEN 325 MG/1
650 TABLET ORAL
Status: DISCONTINUED | OUTPATIENT
Start: 2022-12-05 | End: 2022-12-06 | Stop reason: HOSPADM

## 2022-12-05 RX ORDER — SODIUM CHLORIDE 9 MG/ML
1000 INJECTION, SOLUTION INTRAVENOUS ONCE
Status: COMPLETED | OUTPATIENT
Start: 2022-12-05 | End: 2022-12-06

## 2022-12-05 RX ORDER — SODIUM CHLORIDE 9 MG/ML
126 INJECTION, SOLUTION INTRAVENOUS CONTINUOUS
Status: DISCONTINUED | OUTPATIENT
Start: 2022-12-05 | End: 2022-12-06 | Stop reason: HOSPADM

## 2022-12-05 RX ORDER — SODIUM CHLORIDE 0.9 % (FLUSH) 0.9 %
5-40 SYRINGE (ML) INJECTION EVERY 8 HOURS
Status: DISCONTINUED | OUTPATIENT
Start: 2022-12-05 | End: 2022-12-06 | Stop reason: HOSPADM

## 2022-12-05 RX ORDER — SODIUM CHLORIDE 0.9 % (FLUSH) 0.9 %
5-40 SYRINGE (ML) INJECTION AS NEEDED
Status: DISCONTINUED | OUTPATIENT
Start: 2022-12-05 | End: 2022-12-06 | Stop reason: HOSPADM

## 2022-12-05 RX ORDER — ACETAMINOPHEN 650 MG/1
650 SUPPOSITORY RECTAL
Status: DISCONTINUED | OUTPATIENT
Start: 2022-12-05 | End: 2022-12-06 | Stop reason: HOSPADM

## 2022-12-05 RX ORDER — LORAZEPAM 2 MG/ML
1 INJECTION INTRAMUSCULAR
Status: COMPLETED | OUTPATIENT
Start: 2022-12-05 | End: 2022-12-05

## 2022-12-05 RX ORDER — POLYETHYLENE GLYCOL 3350 17 G/17G
17 POWDER, FOR SOLUTION ORAL DAILY PRN
Status: DISCONTINUED | OUTPATIENT
Start: 2022-12-05 | End: 2022-12-06 | Stop reason: HOSPADM

## 2022-12-05 RX ORDER — LORAZEPAM 2 MG/ML
2 INJECTION INTRAMUSCULAR
Status: COMPLETED | OUTPATIENT
Start: 2022-12-05 | End: 2022-12-05

## 2022-12-05 RX ORDER — LEVOTHYROXINE SODIUM 50 UG/1
50 TABLET ORAL
Status: DISCONTINUED | OUTPATIENT
Start: 2022-12-06 | End: 2022-12-06 | Stop reason: HOSPADM

## 2022-12-05 RX ORDER — ENOXAPARIN SODIUM 100 MG/ML
40 INJECTION SUBCUTANEOUS DAILY
Status: DISCONTINUED | OUTPATIENT
Start: 2022-12-06 | End: 2022-12-06 | Stop reason: HOSPADM

## 2022-12-05 RX ORDER — DIVALPROEX SODIUM 125 MG/1
250 CAPSULE, COATED PELLETS ORAL
Status: DISCONTINUED | OUTPATIENT
Start: 2022-12-06 | End: 2022-12-06 | Stop reason: HOSPADM

## 2022-12-05 RX ORDER — ONDANSETRON 2 MG/ML
4 INJECTION INTRAMUSCULAR; INTRAVENOUS
Status: DISCONTINUED | OUTPATIENT
Start: 2022-12-05 | End: 2022-12-06 | Stop reason: HOSPADM

## 2022-12-05 RX ORDER — ONDANSETRON 4 MG/1
4 TABLET, ORALLY DISINTEGRATING ORAL
Status: DISCONTINUED | OUTPATIENT
Start: 2022-12-05 | End: 2022-12-06 | Stop reason: HOSPADM

## 2022-12-05 RX ADMIN — DOXYCYCLINE 100 MG: 100 INJECTION, POWDER, LYOPHILIZED, FOR SOLUTION INTRAVENOUS at 22:20

## 2022-12-05 RX ADMIN — LORAZEPAM 1 MG: 2 INJECTION INTRAMUSCULAR; INTRAVENOUS at 21:29

## 2022-12-05 RX ADMIN — LORAZEPAM 2 MG: 2 INJECTION INTRAMUSCULAR; INTRAVENOUS at 22:40

## 2022-12-05 RX ADMIN — SODIUM CHLORIDE 1000 ML/HR: 9 INJECTION, SOLUTION INTRAVENOUS at 23:28

## 2022-12-05 RX ADMIN — SODIUM CHLORIDE 250 ML: 9 INJECTION, SOLUTION INTRAVENOUS at 21:16

## 2022-12-05 RX ADMIN — CEFTRIAXONE SODIUM 1 G: 1 INJECTION, POWDER, FOR SOLUTION INTRAMUSCULAR; INTRAVENOUS at 22:11

## 2022-12-06 ENCOUNTER — HOSPITAL ENCOUNTER (INPATIENT)
Age: 57
LOS: 16 days | Discharge: NURSING, REHAB OR LONG-TERM CARE | End: 2022-12-22
Attending: EMERGENCY MEDICINE | Admitting: INTERNAL MEDICINE
Payer: MEDICARE

## 2022-12-06 ENCOUNTER — APPOINTMENT (OUTPATIENT)
Dept: CT IMAGING | Age: 57
End: 2022-12-06
Attending: FAMILY MEDICINE
Payer: MEDICARE

## 2022-12-06 VITALS
HEART RATE: 89 BPM | SYSTOLIC BLOOD PRESSURE: 112 MMHG | BODY MASS INDEX: 22.31 KG/M2 | OXYGEN SATURATION: 95 % | TEMPERATURE: 99.2 F | HEIGHT: 64 IN | RESPIRATION RATE: 15 BRPM | DIASTOLIC BLOOD PRESSURE: 57 MMHG

## 2022-12-06 DIAGNOSIS — J69.0 ASPIRATION PNEUMONIA, UNSPECIFIED ASPIRATION PNEUMONIA TYPE, UNSPECIFIED LATERALITY, UNSPECIFIED PART OF LUNG (HCC): Primary | ICD-10-CM

## 2022-12-06 DIAGNOSIS — L89.629 PRESSURE INJURY OF SKIN OF LEFT HEEL, UNSPECIFIED INJURY STAGE: ICD-10-CM

## 2022-12-06 DIAGNOSIS — A41.9 SEPSIS, DUE TO UNSPECIFIED ORGANISM, UNSPECIFIED WHETHER ACUTE ORGAN DYSFUNCTION PRESENT (HCC): ICD-10-CM
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
## 2022-12-06 PROBLEM — J18.9 HOSPITAL-ACQUIRED PNEUMONIA: Status: ACTIVE | Noted: 2022-12-06

## 2022-12-06 LAB
ALBUMIN SERPL-MCNC: 1.9 G/DL (ref 3.5–5)
ALBUMIN/GLOB SERPL: 0.6 {RATIO} (ref 1.1–2.2)
ALP SERPL-CCNC: 88 U/L (ref 45–117)
ALT SERPL-CCNC: 27 U/L (ref 12–78)
ANION GAP SERPL CALC-SCNC: 4 MMOL/L (ref 5–15)
ANION GAP SERPL CALC-SCNC: 5 MMOL/L (ref 5–15)
ARTERIAL PATENCY WRIST A: ABNORMAL
ARTERIAL PATENCY WRIST A: NORMAL
AST SERPL-CCNC: 35 U/L (ref 15–37)
BASE DEFICIT BLDA-SCNC: 0.2 MMOL/L
BASE EXCESS BLDA CALC-SCNC: 3.1 MMOL/L
BASOPHILS # BLD: 0 K/UL (ref 0–0.1)
BASOPHILS NFR BLD: 0 % (ref 0–1)
BDY SITE: ABNORMAL
BDY SITE: NORMAL
BILIRUB SERPL-MCNC: 0.1 MG/DL (ref 0.2–1)
BUN SERPL-MCNC: 22 MG/DL (ref 6–20)
BUN SERPL-MCNC: 26 MG/DL (ref 6–20)
BUN/CREAT SERPL: 29 (ref 12–20)
BUN/CREAT SERPL: 34 (ref 12–20)
CALCIUM SERPL-MCNC: 7.8 MG/DL (ref 8.5–10.1)
CALCIUM SERPL-MCNC: 8.4 MG/DL (ref 8.5–10.1)
CHLORIDE SERPL-SCNC: 108 MMOL/L (ref 97–108)
CHLORIDE SERPL-SCNC: 113 MMOL/L (ref 97–108)
CO2 SERPL-SCNC: 27 MMOL/L (ref 21–32)
CO2 SERPL-SCNC: 28 MMOL/L (ref 21–32)
COMMENT, HOLDF: NORMAL
CREAT SERPL-MCNC: 0.77 MG/DL (ref 0.55–1.02)
CREAT SERPL-MCNC: 0.77 MG/DL (ref 0.55–1.02)
DIFFERENTIAL METHOD BLD: ABNORMAL
EOSINOPHIL # BLD: 0 K/UL (ref 0–0.4)
EOSINOPHIL NFR BLD: 1 % (ref 0–7)
ERYTHROCYTE [DISTWIDTH] IN BLOOD BY AUTOMATED COUNT: 16.6 % (ref 11.5–14.5)
ERYTHROCYTE [DISTWIDTH] IN BLOOD BY AUTOMATED COUNT: 16.7 % (ref 11.5–14.5)
FLUAV RNA SPEC QL NAA+PROBE: NOT DETECTED
FLUBV RNA SPEC QL NAA+PROBE: NOT DETECTED
GLOBULIN SER CALC-MCNC: 3.4 G/DL (ref 2–4)
GLUCOSE BLD STRIP.AUTO-MCNC: 82 MG/DL (ref 65–117)
GLUCOSE SERPL-MCNC: 114 MG/DL (ref 65–100)
GLUCOSE SERPL-MCNC: 70 MG/DL (ref 65–100)
HCO3 BLDA-SCNC: 25 MMOL/L (ref 22–26)
HCO3 BLDA-SCNC: 28 MMOL/L (ref 22–26)
HCT VFR BLD AUTO: 24.8 % (ref 35–47)
HCT VFR BLD AUTO: 29.5 % (ref 35–47)
HGB BLD-MCNC: 8.2 G/DL (ref 11.5–16)
HGB BLD-MCNC: 9.6 G/DL (ref 11.5–16)
IMM GRANULOCYTES # BLD AUTO: 0 K/UL (ref 0–0.04)
IMM GRANULOCYTES NFR BLD AUTO: 0 % (ref 0–0.5)
LACTATE SERPL-SCNC: 1.1 MMOL/L (ref 0.4–2)
LYMPHOCYTES # BLD: 1.1 K/UL (ref 0.8–3.5)
LYMPHOCYTES NFR BLD: 13 % (ref 12–49)
MAGNESIUM SERPL-MCNC: 1.5 MG/DL (ref 1.6–2.4)
MCH RBC QN AUTO: 32.3 PG (ref 26–34)
MCH RBC QN AUTO: 32.4 PG (ref 26–34)
MCHC RBC AUTO-ENTMCNC: 32.5 G/DL (ref 30–36.5)
MCHC RBC AUTO-ENTMCNC: 33.1 G/DL (ref 30–36.5)
MCV RBC AUTO: 97.6 FL (ref 80–99)
MCV RBC AUTO: 99.7 FL (ref 80–99)
MONOCYTES # BLD: 0.6 K/UL (ref 0–1)
MONOCYTES NFR BLD: 7 % (ref 5–13)
NEUTS SEG # BLD: 6.8 K/UL (ref 1.8–8)
NEUTS SEG NFR BLD: 79 % (ref 32–75)
NRBC # BLD: 0 K/UL (ref 0–0.01)
NRBC # BLD: 0 K/UL (ref 0–0.01)
NRBC BLD-RTO: 0 PER 100 WBC
NRBC BLD-RTO: 0 PER 100 WBC
PCO2 BLDA: 42 MMHG (ref 35–45)
PCO2 BLDA: 45 MMHG (ref 35–45)
PH BLDA: 7.39 [PH] (ref 7.35–7.45)
PH BLDA: 7.42 [PH] (ref 7.35–7.45)
PHOSPHATE SERPL-MCNC: 2.6 MG/DL (ref 2.6–4.7)
PLATELET # BLD AUTO: 191 K/UL (ref 150–400)
PLATELET # BLD AUTO: 99 K/UL (ref 150–400)
PMV BLD AUTO: 12.4 FL (ref 8.9–12.9)
PMV BLD AUTO: 13 FL (ref 8.9–12.9)
PO2 BLDA: 84 MMHG (ref 80–100)
PO2 BLDA: 86 MMHG (ref 80–100)
POTASSIUM SERPL-SCNC: 4 MMOL/L (ref 3.5–5.1)
POTASSIUM SERPL-SCNC: 4.1 MMOL/L (ref 3.5–5.1)
PROCALCITONIN SERPL-MCNC: 0.29 NG/ML
PROT SERPL-MCNC: 5.3 G/DL (ref 6.4–8.2)
RBC # BLD AUTO: 2.54 M/UL (ref 3.8–5.2)
RBC # BLD AUTO: 2.96 M/UL (ref 3.8–5.2)
SAMPLES BEING HELD,HOLD: NORMAL
SAO2 % BLD: 96 % (ref 92–97)
SAO2 % BLD: 97 % (ref 92–97)
SAO2% DEVICE SAO2% SENSOR NAME: ABNORMAL
SAO2% DEVICE SAO2% SENSOR NAME: NORMAL
SARS-COV-2, COV2: NOT DETECTED
SERVICE CMNT-IMP: NORMAL
SODIUM SERPL-SCNC: 141 MMOL/L (ref 136–145)
SODIUM SERPL-SCNC: 144 MMOL/L (ref 136–145)
SPECIMEN SITE: ABNORMAL
SPECIMEN SITE: NORMAL
WBC # BLD AUTO: 8.6 K/UL (ref 3.6–11)
WBC # BLD AUTO: 9.2 K/UL (ref 3.6–11)

## 2022-12-06 PROCEDURE — 36415 COLL VENOUS BLD VENIPUNCTURE: CPT

## 2022-12-06 PROCEDURE — 80048 BASIC METABOLIC PNL TOTAL CA: CPT

## 2022-12-06 PROCEDURE — 82962 GLUCOSE BLOOD TEST: CPT

## 2022-12-06 PROCEDURE — 74176 CT ABD & PELVIS W/O CONTRAST: CPT

## 2022-12-06 PROCEDURE — 87636 SARSCOV2 & INF A&B AMP PRB: CPT

## 2022-12-06 PROCEDURE — 74011000250 HC RX REV CODE- 250: Performed by: HOSPITALIST

## 2022-12-06 PROCEDURE — 74011250636 HC RX REV CODE- 250/636: Performed by: NURSE PRACTITIONER

## 2022-12-06 PROCEDURE — 74011000250 HC RX REV CODE- 250: Performed by: EMERGENCY MEDICINE

## 2022-12-06 PROCEDURE — 87150 DNA/RNA AMPLIFIED PROBE: CPT

## 2022-12-06 PROCEDURE — 74011000250 HC RX REV CODE- 250: Performed by: NURSE PRACTITIONER

## 2022-12-06 PROCEDURE — 83735 ASSAY OF MAGNESIUM: CPT

## 2022-12-06 PROCEDURE — 84145 PROCALCITONIN (PCT): CPT

## 2022-12-06 PROCEDURE — 74011000258 HC RX REV CODE- 258: Performed by: HOSPITALIST

## 2022-12-06 PROCEDURE — 99285 EMERGENCY DEPT VISIT HI MDM: CPT

## 2022-12-06 PROCEDURE — 65660000001 HC RM ICU INTERMED STEPDOWN

## 2022-12-06 PROCEDURE — 85027 COMPLETE CBC AUTOMATED: CPT

## 2022-12-06 PROCEDURE — 80053 COMPREHEN METABOLIC PANEL: CPT

## 2022-12-06 PROCEDURE — 74011250636 HC RX REV CODE- 250/636: Performed by: HOSPITALIST

## 2022-12-06 PROCEDURE — 85025 COMPLETE CBC W/AUTO DIFF WBC: CPT

## 2022-12-06 PROCEDURE — 84100 ASSAY OF PHOSPHORUS: CPT

## 2022-12-06 PROCEDURE — 74011000258 HC RX REV CODE- 258: Performed by: NURSE PRACTITIONER

## 2022-12-06 PROCEDURE — 83605 ASSAY OF LACTIC ACID: CPT

## 2022-12-06 RX ORDER — FACIAL-BODY WIPES
10 EACH TOPICAL
Status: DISCONTINUED | OUTPATIENT
Start: 2022-12-06 | End: 2022-12-22 | Stop reason: HOSPADM

## 2022-12-06 RX ORDER — ACETAMINOPHEN 650 MG/1
650 SUPPOSITORY RECTAL
Status: DISCONTINUED | OUTPATIENT
Start: 2022-12-06 | End: 2022-12-22 | Stop reason: HOSPADM

## 2022-12-06 RX ORDER — ACETAMINOPHEN 325 MG/1
650 TABLET ORAL
Status: DISCONTINUED | OUTPATIENT
Start: 2022-12-06 | End: 2022-12-22 | Stop reason: HOSPADM

## 2022-12-06 RX ORDER — GUAIFENESIN/DEXTROMETHORPHAN 100-10MG/5
5 SYRUP ORAL
Status: DISCONTINUED | OUTPATIENT
Start: 2022-12-07 | End: 2022-12-22 | Stop reason: HOSPADM

## 2022-12-06 RX ORDER — HYDROXYZINE HYDROCHLORIDE 10 MG/1
10 TABLET, FILM COATED ORAL
Status: DISCONTINUED | OUTPATIENT
Start: 2022-12-06 | End: 2022-12-22 | Stop reason: HOSPADM

## 2022-12-06 RX ORDER — MIDODRINE HYDROCHLORIDE 5 MG/1
10 TABLET ORAL EVERY 6 HOURS
Status: DISCONTINUED | OUTPATIENT
Start: 2022-12-06 | End: 2022-12-07

## 2022-12-06 RX ORDER — ENOXAPARIN SODIUM 100 MG/ML
40 INJECTION SUBCUTANEOUS DAILY
Status: DISCONTINUED | OUTPATIENT
Start: 2022-12-06 | End: 2022-12-07

## 2022-12-06 RX ORDER — CEFEPIME HYDROCHLORIDE 1 G/1
1 INJECTION, POWDER, FOR SOLUTION INTRAMUSCULAR; INTRAVENOUS EVERY 8 HOURS
Status: DISCONTINUED | OUTPATIENT
Start: 2022-12-06 | End: 2022-12-06

## 2022-12-06 RX ORDER — ONDANSETRON 2 MG/ML
4 INJECTION INTRAMUSCULAR; INTRAVENOUS
Status: DISCONTINUED | OUTPATIENT
Start: 2022-12-06 | End: 2022-12-22 | Stop reason: HOSPADM

## 2022-12-06 RX ORDER — GABAPENTIN 600 MG/1
600 TABLET ORAL 3 TIMES DAILY
Status: DISCONTINUED | OUTPATIENT
Start: 2022-12-06 | End: 2022-12-07

## 2022-12-06 RX ORDER — DIAZEPAM 5 MG/1
5 TABLET ORAL
Status: DISCONTINUED | OUTPATIENT
Start: 2022-12-06 | End: 2022-12-22 | Stop reason: HOSPADM

## 2022-12-06 RX ORDER — LANOLIN ALCOHOL/MO/W.PET/CERES
3 CREAM (GRAM) TOPICAL
Status: DISCONTINUED | OUTPATIENT
Start: 2022-12-06 | End: 2022-12-22 | Stop reason: HOSPADM

## 2022-12-06 RX ORDER — IPRATROPIUM BROMIDE AND ALBUTEROL SULFATE 2.5; .5 MG/3ML; MG/3ML
3 SOLUTION RESPIRATORY (INHALATION)
Status: DISCONTINUED | OUTPATIENT
Start: 2022-12-06 | End: 2022-12-22 | Stop reason: HOSPADM

## 2022-12-06 RX ORDER — SODIUM CHLORIDE 0.9 % (FLUSH) 0.9 %
5-40 SYRINGE (ML) INJECTION EVERY 8 HOURS
Status: DISCONTINUED | OUTPATIENT
Start: 2022-12-06 | End: 2022-12-22 | Stop reason: HOSPADM

## 2022-12-06 RX ORDER — NOREPINEPHRINE BITARTRATE/D5W 8 MG/250ML
.5-16 PLASTIC BAG, INJECTION (ML) INTRAVENOUS
Status: DISCONTINUED | OUTPATIENT
Start: 2022-12-06 | End: 2022-12-06

## 2022-12-06 RX ORDER — SODIUM CHLORIDE 0.9 % (FLUSH) 0.9 %
5-40 SYRINGE (ML) INJECTION AS NEEDED
Status: DISCONTINUED | OUTPATIENT
Start: 2022-12-06 | End: 2022-12-22 | Stop reason: HOSPADM

## 2022-12-06 RX ORDER — POLYETHYLENE GLYCOL 3350 17 G/17G
17 POWDER, FOR SOLUTION ORAL DAILY PRN
Status: DISCONTINUED | OUTPATIENT
Start: 2022-12-06 | End: 2022-12-22 | Stop reason: HOSPADM

## 2022-12-06 RX ORDER — LEVOTHYROXINE SODIUM 50 UG/1
50 TABLET ORAL
Status: DISCONTINUED | OUTPATIENT
Start: 2022-12-06 | End: 2022-12-22 | Stop reason: HOSPADM

## 2022-12-06 RX ORDER — NOREPINEPHRINE BITARTRATE/D5W 8 MG/250ML
0-16 PLASTIC BAG, INJECTION (ML) INTRAVENOUS
Status: DISCONTINUED | OUTPATIENT
Start: 2022-12-06 | End: 2022-12-06

## 2022-12-06 RX ORDER — SODIUM CHLORIDE, SODIUM LACTATE, POTASSIUM CHLORIDE, CALCIUM CHLORIDE 600; 310; 30; 20 MG/100ML; MG/100ML; MG/100ML; MG/100ML
75 INJECTION, SOLUTION INTRAVENOUS CONTINUOUS
Status: DISCONTINUED | OUTPATIENT
Start: 2022-12-06 | End: 2022-12-07

## 2022-12-06 RX ORDER — DIVALPROEX SODIUM 250 MG/1
250 TABLET, DELAYED RELEASE ORAL 3 TIMES DAILY
COMMUNITY

## 2022-12-06 RX ORDER — ONDANSETRON 4 MG/1
4 TABLET, ORALLY DISINTEGRATING ORAL
Status: DISCONTINUED | OUTPATIENT
Start: 2022-12-06 | End: 2022-12-22 | Stop reason: HOSPADM

## 2022-12-06 RX ORDER — POLYETHYLENE GLYCOL 3350 17 G/17G
17 POWDER, FOR SOLUTION ORAL DAILY
Status: DISCONTINUED | OUTPATIENT
Start: 2022-12-06 | End: 2022-12-22 | Stop reason: HOSPADM

## 2022-12-06 RX ADMIN — PIPERACILLIN AND TAZOBACTAM 4.5 G: 4; .5 INJECTION, POWDER, FOR SOLUTION INTRAVENOUS at 01:12

## 2022-12-06 RX ADMIN — SODIUM CHLORIDE, PRESERVATIVE FREE 10 ML: 5 INJECTION INTRAVENOUS at 00:10

## 2022-12-06 RX ADMIN — VANCOMYCIN HYDROCHLORIDE 750 MG: 750 INJECTION, POWDER, LYOPHILIZED, FOR SOLUTION INTRAVENOUS at 02:07

## 2022-12-06 RX ADMIN — SODIUM CHLORIDE 126 ML/HR: 9 INJECTION, SOLUTION INTRAVENOUS at 00:10

## 2022-12-06 RX ADMIN — NOREPINEPHRINE BITARTRATE 4 MCG/MIN: 1 INJECTION, SOLUTION, CONCENTRATE INTRAVENOUS at 02:37

## 2022-12-06 RX ADMIN — PIPERACILLIN AND TAZOBACTAM 3.38 G: 3; .375 INJECTION, POWDER, FOR SOLUTION INTRAVENOUS at 06:02

## 2022-12-06 RX ADMIN — VANCOMYCIN HYDROCHLORIDE 750 MG: 750 INJECTION, POWDER, LYOPHILIZED, FOR SOLUTION INTRAVENOUS at 01:17

## 2022-12-06 RX ADMIN — SODIUM CHLORIDE 1000 ML: 9 INJECTION, SOLUTION INTRAVENOUS at 00:42

## 2022-12-06 RX ADMIN — CEFEPIME 2 G: 2 INJECTION, POWDER, FOR SOLUTION INTRAVENOUS at 20:11

## 2022-12-06 RX ADMIN — SODIUM CHLORIDE, POTASSIUM CHLORIDE, SODIUM LACTATE AND CALCIUM CHLORIDE 1000 ML: 600; 310; 30; 20 INJECTION, SOLUTION INTRAVENOUS at 04:38

## 2022-12-06 RX ADMIN — SODIUM CHLORIDE 2 G: 9 INJECTION INTRAMUSCULAR; INTRAVENOUS; SUBCUTANEOUS at 10:04

## 2022-12-06 RX ADMIN — SODIUM CHLORIDE, POTASSIUM CHLORIDE, SODIUM LACTATE AND CALCIUM CHLORIDE 75 ML/HR: 600; 310; 30; 20 INJECTION, SOLUTION INTRAVENOUS at 14:48

## 2022-12-06 RX ADMIN — ENOXAPARIN SODIUM 40 MG: 100 INJECTION SUBCUTANEOUS at 10:04

## 2022-12-06 NOTE — PROGRESS NOTES
Day #1 of cefepime  Indication:  pneumonia  Current regimen:  1g Q8H  Abx regimen: cefepime  Recent Labs     22  0559 22   WBC 9.2 12.2*   CREA 0.77 1.22*   BUN 26* 36*     Est CrCl: 71 ml/min; UO: -- ml/kg/hr  Temp (24hrs), Av.4 °F (35.2 °C), Min:91.2 °F (32.9 °C), Max:99.4 °F (37.4 °C)    Cultures:    blood - in process   MRSA - in process   flu - negative    Plan: Change to 2g Q8H based on renal function and indication

## 2022-12-06 NOTE — ED NOTES
Caregiver at bedside. States pt is ambulatory with assist. Stated pt was febrile and pale. Reports she is new to the facility and they do not have much history on her.      Contact # [phone]

## 2022-12-06 NOTE — ED NOTES
Current /52 MAP-71, Levophed at bedside PRN if MAP drops below 65, RN will initiate. Still attempting to gain addition IV access at this time.

## 2022-12-06 NOTE — ED NOTES
Pt is snoring. Pt is responsive to painful stimuli. Pt rectal temp has increased but pt is still hypothermic at 93.3 rectal. Pt has Roni Hugger on currently set to highest setting at [address]. Pt BP is 113/59 and currently receiving Levophed at 4mcg/min. Calling transfer center to enquire about pending ED to ED transfer of pt. Will continue to monitor patient.

## 2022-12-06 NOTE — PROGRESS NOTES
Addendum by [facility]:  Patient off Levophed IV infusion for last few hours. Vital signs stable and patient condition stable. Will downgrade to hospitalist service for transfer to tele floor. Patient remains in ER bed 8 2/2 no ICU bed available thus far in ICU. Perfect serve sent to Dr. Lu Castillo @ 13:38, read 14:00.     Bryson Lopez AGACNP-BC

## 2022-12-06 NOTE — ED NOTES
Goal of MAP has been achieved for one hour per MD request. Stopping transfusion at this time. Will continue to monitor pt for decrease in BP.

## 2022-12-06 NOTE — ED NOTES
Patient current BP 59/36, Doug RN calling Hospitalist, ED MD at bedside, requesting current L NS be pressure bagged in. ED MD at bedside. Charge RN at bedside attempting to establish addition IV access.

## 2022-12-06 NOTE — ED NOTES
TRANSFER - IN REPORT:    Verbal report received from [facility] (name) on Deena Ogden  being received from [facility] PCU(unit) for change in patient condition(Decompensation)      Report consisted of patients Situation, Background, Assessment and   Recommendations(SBAR). Information from the following report(s) SBAR, Kardex, ED Summary, Procedure Summary, MAR, Recent Results, Alarm Parameters , and Quality Measures was reviewed with the receiving nurse. Opportunity for questions and clarification was provided. Assessment completed upon patients arrival to unit and care assumed.

## 2022-12-06 NOTE — PROGRESS NOTES
First CT Head scan was not successes due to patient movement of their head.  MD requested @1503 to medicate patient and try again

## 2022-12-06 NOTE — PROGRESS NOTES
0205  Message paged to telehospitalist via perfect serve:    12/6/22 2:05 AM  [facility] From: Renee Almaguer RE: Mark Phi 1965 RM: [id number] Pt admitted to PCU with dx of sepsis from multilobe PNA and JEREMY. Pt nonverbal, rectal temp collected on admit 91.4. We are applying the bearhugger. Please review and advise on additional orders. Need Callback: NO CALLBACK REQ MED SURG UNIT URGENT    Read 2:07 AM    0220  Telephone order received to enter order for mary dumont. Order entered by this RN, primary RN notified of official orders.     0222  Mary julia applied by primary nurse, Kristine Santoyo RN

## 2022-12-06 NOTE — PROGRESS NOTES
12/6/22; 1730 -   CM received call from Attending Team identifying that the patient no longer requires ICU admission for pressor support. Attending Team is inquiring if patient can be transferred back to [facility] for ongoing medical management, to include IVF, and IV ABX. CM discussed patient with CM Management, who identified that the patient was considered an ED to ED transfer due to being transferred back to the [facility] ED from the unit due to unstable BP. CM discussed patient with Attending. Attending will need to contact Hospitalist Team at [facility] to request acceptance and transfer. [facility] Nursing Team is aware of pending request.    CM to continue following for possible [place] Needs. CRM: Claus Andrews, MPH, [address] PARMAR 18.: [phone] or [phone] -  Per Attending, patient to be held overnight with plan to transfer to [facility] 12/7. [facility] Hospitalist has tentatively accepted the patient for 12/7 transfer.   CRM: Claus Andrews, MPH, Mukul PARMAR 18.: [phone] or [phone]

## 2022-12-06 NOTE — ED NOTES
Called facility where patient came from, per EMS staff could not provide history on patient just kept saying she is new to us. Patient came with a MAR. This writer asked caregiver Anand Stone to fax patient information including DNR papers that staff reported to EMS was in place.  Day group phone number is [phone]

## 2022-12-06 NOTE — PROGRESS NOTES
TRANSFER - IN REPORT:    [facility] Solo Deluca  being received from ED(unit) for routine progression of care      Report consisted of patients Situation, Background, Assessment and   Recommendations(SBAR). Information from the following report(s) SBAR, Kardex, MAR, and Recent Results was reviewed with the receiving nurse. Opportunity for questions and clarification was provided. Assessment completed upon patients arrival to unit and care assumed. 1454: Assessed patient's skin and vital signs obtained. Unable to obtain oral or axillary temp and patient is cool to touch. Rectal temp obtained. MEWS score 6 due to patient being unresponsive, BP of 87/62, and Rectal temp of 91.4 Farenheit. Braden Mchugh RN to message MD. Nursing supervisor made aware. 0205: Warm packs and extra blankets placed on patient. 0222: Mary hugger placed on patient. 46: Messaged Dr. Joaquin Leary the following: \"Patient just admitted from ED with Sepsis r/t pneumonia. I just started her on the mary hugger and I am about to start her Levophed drip as her MAP consistently under 65. She will need to be taken back down to the ED and will need to be transferred out to a higher level of care. \"    0236: Reply from Dr. Joaquin Leary: \"I agree. Please send me the transfer center #\"    [phone]: Levophed drip started at 4 mcg/min. 4176: Messaged Dr. Joaquin Leary the following: \"We are transferring pt back down to ER to await ambulance transfer out to higher level of care\"    0248: Patient transferred down to ED room 5.  Report given to Mark Sterling

## 2022-12-06 NOTE — ED NOTES
TRANSFER - OUT REPORT:    Verbal report given to Francis Jessica RN (name) on Isidro Dixonable  being transferred to PCU 4(unit) for routine progression of care       Report consisted of patients Situation, Background, Assessment and   Recommendations(SBAR). Information from the following report(s) SBAR, Kardex, ED Summary, [facility] and Recent Results was reviewed with the receiving nurse. Lines:   Peripheral IV 12/05/22 Right;Superior Antecubital (Active)        Opportunity for questions and clarification was provided.       Patient transported with:   Registered Nurse

## 2022-12-06 NOTE — ED PROVIDER NOTES
EMERGENCY DEPARTMENT HISTORY AND PHYSICAL EXAM      Date: 12/5/2022  Patient Name: Rosalie Leon    History of Presenting Illness     Chief Complaint   Patient presents with    Altered mental status       History Provided By: EMS    HPI: Rosalie Leon, 56 y.o. female presents to the ED with cc of altered mental status. Patient is nonverbal and barely breathing, was sent from her nursing home facility because nauseous over the and not this a change in her mental status. Presently patient is in no cardiorespiratory distress      There are no other associated symptoms, patient concerns, or physical findings at this time. I reviewed the vital signs, available nursing notes, past medical history, past surgical history, family history and social history. Vital Signs:  Patient Vitals for the past 12 hrs:   Temp Pulse Resp BP SpO2   12/05/22 1938 -- -- -- -- 99 %   12/05/22 1925 97.6 °F (36.4 °C) 83 16 131/82 99 %     Vital signs reviewed. Current Medications:  No current facility-administered medications on file prior to encounter. Current Outpatient Medications on File Prior to Encounter   Medication Sig Dispense Refill    scopolamine (TRANSDERM-SCOP) 1 mg over 3 days pt3d 1 Patch by TransDERmal route every seventy-two (72) hours. 24 Patch 5    fluticasone propionate (FLONASE) 50 mcg/actuation nasal spray 2 Sprays by Both Nostrils route daily as needed for Rhinitis or Allergies. Indications: inflammation of the nose due to an allergy 1 Each 5    levothyroxine (SYNTHROID) 50 mcg tablet Take 1 Tablet by mouth Daily (before breakfast). 90 Tablet 4    midodrine (PROAMATINE) 10 mg tablet TAKE 1 TABLET BY MOUTH IN THE MORNING AND IN THE EVENING WITH BREAKFAST & DINNER 60 Tablet 11    psyllium seed-dextrose (Reguloid) powd Take 1 Scoop by mouth daily as needed for Other (constipation). One-time fill as  leaving [facility].  3 Bottle 0    acetaminophen (TylenoL) 325 mg tablet Take 1 Tab by mouth every six (6) hours as needed for Pain. 100 Tab 11    dextromethorphan-guaiFENesin (Robitussin Cough-Chest Narendra DM) 5-100 mg/5 mL liqd Take 5 mL by mouth every four to six (4-6) hours as needed for Cough or Congestion. 1 Bottle 11    melatonin 3 mg tablet Take 1 Tab by mouth nightly as needed for Insomnia. 90 Tab 3    multivitamin (ONE A DAY) tablet Take 1 Tab by mouth daily. 90 Tab 3    bisacodyL (DULCOLAX) 10 mg supp Insert 10 mg into rectum daily as needed for Constipation. 90 Suppository 3    hydrocortisone (HYTONE) 2.5 % ointment Apply  to affected area two (2) times daily as needed for Skin Irritation. use thin layer   Indications: To face 30 g 11    divalproex sodium (DIVALPROEX PO) Take 250 mg by mouth three (3) times daily (with meals). gabapentin (NEURONTIN) 600 mg tablet Take 600 mg by mouth three (3) times daily. Indications: 2 tab by mouth      polyethylene glycol (MIRALAX) 17 gram packet Take 17 g by mouth daily. diazePAM (VALIUM) 5-7.5-10 mg kit Insert 10 mg into rectum as needed (As needed for seizure lasting 3 minutes or 2 within 30 minutes of each other). diazePAM (VALIUM) 5 mg tablet Take 5 mg by mouth every six (6) hours as needed for Anxiety (PRN for procedures). docusate sodium (COLACE) 100 mg capsule Take 100 mg by mouth two (2) times daily as needed for Constipation. hydrOXYzine HCl (ATARAX) 25 mg tablet Take  by mouth every six (6) hours as needed for Itching.      ketoconazole (NIZORAL) 2 % shampoo Apply  to affected area three (3) days a week.      magnesium citrate solution Take 150 mL by mouth as needed.          Past History     Past Medical History:  Past Medical History:   Diagnosis Date    Constipation     Hypotension     Psychomotor retardation     severe    Seizure disorder [facility])        Past Surgical History:  Past Surgical History:   Procedure Laterality Date    COLONOSCOPY N/A 11/18/2019    COLONOSCOPY performed by zAam May MD at [facility] ENDOSCOPY SIGMOIDOSCOPY,DIAGNOSTIC  11/18/2019            Family History:  No family history on file. Social History:  Social History     Tobacco Use    Smoking status: Never    Smokeless tobacco: Never   Substance Use Topics    Alcohol use: Never    Drug use: Never       Allergies: Allergies   Allergen Reactions    Sting, Bee Anaphylaxis    Methsuximide Unknown (comments)     Reaction Type: Allergy           Review of Systems   Review of Systems   Unable to perform ROS: Mental status change   Constitutional:  Negative for fever. HENT:  Negative for sore throat. Eyes:  Negative for photophobia and redness. Respiratory:  Negative for shortness of breath and wheezing. Cardiovascular:  Negative for chest pain and leg swelling. Gastrointestinal:  Negative for abdominal pain, blood in stool, nausea and vomiting. Genitourinary:  Negative for difficulty urinating, dysuria, hematuria, menstrual problem and vaginal bleeding. Musculoskeletal:  Negative for back pain and joint swelling. Neurological:  Negative for dizziness, seizures, syncope, speech difficulty, weakness, numbness and headaches. Hematological:  Negative for adenopathy. Psychiatric/Behavioral:  Negative for agitation, confusion and suicidal ideas. The patient is not nervous/anxious. All other systems reviewed and are negative. Physical Exam   Physical Exam  Vitals and nursing note reviewed. Constitutional:       General: She is not in acute distress. Appearance: She is well-developed. She is ill-appearing. HENT:      Head: Normocephalic and atraumatic. Mouth/Throat:      Pharynx: No oropharyngeal exudate. Eyes:      General:         Left eye: No discharge. Extraocular Movements: Extraocular movements intact. Conjunctiva/sclera: Conjunctivae normal.      Pupils: Pupils are equal, round, and reactive to light. Neck:      Vascular: No JVD. Cardiovascular:      Rate and Rhythm: Normal rate and regular rhythm. Heart sounds: Normal heart sounds. Pulmonary:      Effort: Pulmonary effort is normal. No respiratory distress. Breath sounds: Rhonchi present. No wheezing. Abdominal:      General: Bowel sounds are normal. There is no distension. Palpations: Abdomen is soft. Tenderness: There is no abdominal tenderness. There is no guarding or rebound. Musculoskeletal:         General: No tenderness. Normal range of motion. Cervical back: Normal range of motion and neck supple. Lymphadenopathy:      Cervical: No cervical adenopathy. Skin:     General: Skin is warm and dry. Findings: No rash. Neurological:      Mental Status: She is alert. She is disoriented and confused. GCS: GCS eye subscore is 4. GCS verbal subscore is 3. GCS motor subscore is 3. Cranial Nerves: No cranial nerve deficit. Deep Tendon Reflexes: Reflexes are normal and symmetric. Psychiatric:      Comments: Depressed mood. Emergency Department Course   ED Course:  Initial assessment performed. The patient's complaints have been discussed, and they are in agreement with the care plan formulated and outlined with them. I have encouraged them to ask questions as they arise throughout their visit. EKG interpretation: (Preliminary)  Rhythm: normal sinus rhythm; and regular . Rate (approx.): 83; Axis: normal; AL interval: normal; QRS interval: normal ; ST/T wave: normal; Other findings: normal.  EKG read and interpreted by Chuy Araiza MD     Medical Decision Making:  Altered mental status, pneumonia, UTI, electrolyte normalities, CVA. Critical Care Time:      Procedure:      Progress note:   Time:9:25 pm discussed with Dr. Amy Sorensen hospitalist who wants patient to get the CAT scan of the chest, ABG and a head CT before he makes his decision to admit patient. 10:54 PM  Discussed pt's hx, disposition, and available diagnostic and imaging results with . Reviewed care plans.  Agrees with plans as outlined. Care of pt transferred to  at 11:00 pm.     Disposition:  ADMITTED at 11:15 pm, patient is being admitted to the hospital. The results of their tests and reasons for their admission have been discussed with them and/or available family. They convey agreement and understanding for the need to be admitted and for their admission diagnosis. Consultation has been made with -hospitalist for hospitalization. DISCHARGE PLAN:  1. Current Discharge Medication List        2. Follow-up Information    None       3. Return to ED if current symptoms worsen or new symptoms arise. 4. Follow up with Fabio Larsen MD in 3-5 days. Diagnosis     Clinical Impression: No diagnosis found.

## 2022-12-06 NOTE — H&P
Hospitalist Admission Note    NAME: Rosalie Leon   :  1965   MRN:  [id number]     Date/Time:  2022 11:43 PM    Patient PCP: Alon Hahn MD  ________________________________________________________________________    Given the patient's current clinical presentation, I have a high level of concern for decompensation +/- unsafe if discharged from the emergency department. Complex decision making was performed, which includes reviewing the patient's available past medical records, laboratory results, and x-ray films. My assessment of this patient's clinical condition and my plan of care is as follows:    Assessment / Plan:    Septic shock from multilobar pneumonia  Suspect aspiration pneumonia  Acute metabolic encephalopathy  Patient's presentation with reports of nausea and altered mental status as well as chest x-ray findings suggestive of aspiration pneumonia; cannot rule out community-acquired pneumonia or even healthcare associated pneumonia. Will be treated with IV Zosyn, with vancomycin x1 dose. We will keep on current doxycycline for atypical coverage. Started on Levophed in the emergency room which will be continued, and hopefully titrated off. Continue IV fluid hydration. Check lactic acid. Check procalcitonin. Check MRSA nares. Check and follow-up blood cultures. Keep n.p.o. for now. Speech evaluation will be warranted. Acute kidney injury  Likely prerenal.  Continue IV fluid hydration and titrate accordingly. Seizure disorder  Resume home oral medications tomorrow. Reassess for potential to starting IV antiseizure medications if cannot take oral.    Nonverbal at baseline  Bedbound at baseline  Impaired mental capability  Group home patient  Will certainly require someone to speak with group home tomorrow to obtain further history. Will need significant evaluation by case management, PT and OT prior to discharge.     I spoke with patient's daughter over the phone twice and given appropriate status and updates. He confirms a DO NOT RESUSCITATE over the phone. Code Status: DNR/DNI. DNR confirmed by dad over the phone. DVT Prophylaxis: Lovenox  GI Prophylaxis: not indicated    Total time spent on critical care 65 minutes. Subjective:   CHIEF COMPLAINT: \"Nausea\"    HISTORY OF PRESENT ILLNESS:     Alley Rosen is a 56 y.o. rather unfortunate group home patient who was brought into the emergency room today apparently by caregiver from the group home. History was obtained from ED physician as well as RN. However, history was very limited. Patient apparently had nausea yesterday, and had decreased mentation today. Was also noted that patient is nonverbal at baseline. I requested to speak with the patient's caregiver and the nurse gave me a phone number of [phone]. Theodore Verdugo, patient's dad actually picked up the phone. He was surprised that patient was in the hospital.  He tells me that patient just started at this group home last Thursday, less than 1 week ago. He reported that they had told him that patient will be taken to another hospital if she becomes sick. 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 She has a seizure disorder but it has been a while since patient had a seizure. He further reported that last time patient was however sick and in the hospital was approximately 2 years ago. He tells me patient is on puréed diet. Initial work-up in the emergency room revealed a mild leukocytosis of 12.2 as well as a chest x-ray reporting bilateral infiltrate. During my initial conversation with ED physician, I requested a CT of the head as well as CT of the chest with ABG given bicarbonate of greater than 30, and patient's altered presentation. I also requested a urinalysis. CT of the head had no acute pathology, however CT of the chest revealing multifocal pneumonia on emphysema. ABG with no noted hypercapnia. Urinalysis not consistent with acute urinary infection. Patient was given Rocephin and doxycycline in the emergency room. During my virtual evaluation of the patient, she was arousable to tactile stimuli but otherwise falls back to sleep. Per the RN, she had just been administered benzodiazepine. After initial acceptance of patient and admission orders were written, with patient still been in the emergency room, was notified that patient was hypotensive with systolic blood pressures in the 60s. IV fluid bolus was ordered as well as commencement of Levophed. Past Medical History:   Diagnosis Date    Constipation     Hypotension     Psychomotor retardation     severe    Seizure disorder [facility])       Past Surgical History:   Procedure Laterality Date    COLONOSCOPY N/A 11/18/2019    COLONOSCOPY performed by Magalys Denise MD at [facility]  11/18/2019          Social History     Tobacco Use    Smoking status: Never    Smokeless tobacco: Never   Substance Use Topics    Alcohol use: Never      Family history: Unobtainable at this time. Allergies   Allergen Reactions    Sting, Bee Anaphylaxis    Methsuximide Unknown (comments)     Reaction Type: Allergy          Prior to Admission medications    Medication Sig Start Date End Date Taking? Authorizing Provider   scopolamine (TRANSDERM-SCOP) 1 mg over 3 days pt3d 1 Patch by TransDERmal route every seventy-two (72) hours. 11/9/22   Brandi Henley MD   fluticasone propionate (FLONASE) 50 mcg/actuation nasal spray 2 Sprays by Both Nostrils route daily as needed for Rhinitis or Allergies. Indications: inflammation of the nose due to an allergy 8/26/22   Brandi Henley MD   levothyroxine (SYNTHROID) 50 mcg tablet Take 1 Tablet by mouth Daily (before breakfast).  8/23/22   Brandi Henley MD   midodrine (PROAMATINE) 10 mg tablet TAKE 1 TABLET BY MOUTH IN THE MORNING AND IN THE EVENING WITH BREAKFAST & DINNER 6/3/22   Brandi Henley MD   psyllium seed-dextrose (Reguloid) powd Take 1 Scoop by mouth daily as needed for Other (constipation). One-time fill as  leaving [facility]. 3/23/21   Triston Maynard MD   acetaminophen (TylenoL) 325 mg tablet Take 1 Tab by mouth every six (6) hours as needed for Pain. 1/8/21   Triston Maynard MD   dextromethorphan-guaiFENesin (Robitussin Cough-Chest Narendra DM) 5-100 mg/5 mL liqd Take 5 mL by mouth every four to six (4-6) hours as needed for Cough or Congestion. 1/8/21   Triston Maynard MD   melatonin 3 mg tablet Take 1 Tab by mouth nightly as needed for Insomnia. 1/8/21   Triston Maynard MD   multivitamin (ONE A DAY) tablet Take 1 Tab by mouth daily. 1/8/21   Triston Maynard MD   bisacodyL (DULCOLAX) 10 mg supp Insert 10 mg into rectum daily as needed for Constipation. 1/8/21   Triston Maynard MD   hydrocortisone (HYTONE) 2.5 % ointment Apply  to affected area two (2) times daily as needed for Skin Irritation. use thin layer   Indications: To face 1/8/21   Triston Maynard MD   divalproex sodium (DIVALPROEX PO) Take 250 mg by mouth three (3) times daily (with meals). Provider, Historical   gabapentin (NEURONTIN) 600 mg tablet Take 600 mg by mouth three (3) times daily. Indications: 2 tab by mouth    Provider, Historical   polyethylene glycol (MIRALAX) 17 gram packet Take 17 g by mouth daily. Provider, Historical   diazePAM (VALIUM) 5-7.5-10 mg kit Insert 10 mg into rectum as needed (As needed for seizure lasting 3 minutes or 2 within 30 minutes of each other). Provider, Historical   diazePAM (VALIUM) 5 mg tablet Take 5 mg by mouth every six (6) hours as needed for Anxiety (PRN for procedures). Provider, Historical   docusate sodium (COLACE) 100 mg capsule Take 100 mg by mouth two (2) times daily as needed for Constipation. Provider, Historical   hydrOXYzine HCl (ATARAX) 25 mg tablet Take  by mouth every six (6) hours as needed for Itching.     Provider, Historical ketoconazole (NIZORAL) 2 % shampoo Apply  to affected area three (3) days a week. Provider, Historical   magnesium citrate solution Take 150 mL by mouth as needed. Provider, Historical       REVIEW OF SYSTEMS:  See HPI for details. Negative otherwise. Objective:   VITALS:    Visit Vitals  BP (!) 81/39   Pulse 77   Temp 97.6 °F (36.4 °C)   Resp 16   SpO2 97%       PHYSICAL EXAM:    Gen: Arousable to tactile stimuli but falls back to sleep. Chronically ill-appearing. HEENT:  NC/AT. Resp:  Unlabored breathing. Clear breath sounds with good air entry. No wheezes rales or rhonchi. Card:  Regular rate and rhythm. Normal S1 and S2. Abd:  Non-distended. Neuro: Arousable to tactile stimuli but otherwise very limited exam.  _______________________________________________________________________  Care Plan discussed with: Patient's dad and RN.  _______________________________________________________________________  Recommended Disposition: Inpatient admission.   ______________________________________________________________________    Signed: Amy Hardwick MD    LAB DATA REVIEWED:    Recent Results (from the past 24 hour(s))   EKG, 12 LEAD, INITIAL    Collection Time: 12/05/22  7:57 PM   Result Value Ref Range    Ventricular Rate 83 BPM    Atrial Rate 83 BPM    P-R Interval 156 ms    QRS Duration 74 ms    Q-T Interval 360 ms    QTC Calculation (Bezet) 423 ms    Calculated P Axis 73 degrees    Calculated R Axis 51 degrees    Calculated T Axis 64 degrees    Diagnosis Normal sinus rhythm  No previous ECGs available      CBC WITH AUTOMATED DIFF    Collection Time: 12/05/22  8:37 PM   Result Value Ref Range    WBC 12.2 (H) 3.6 - 11.0 K/uL    RBC 3.42 (L) 3.80 - 5.20 M/uL    HGB 11.0 (L) 11.5 - 16.0 g/dL    HCT 32.8 (L) 35.0 - 47.0 %    MCV 95.9 80.0 - 99.0 FL    MCH 32.2 26.0 - 34.0 PG    MCHC 33.5 30.0 - 36.5 g/dL    RDW 16.4 (H) 11.5 - 14.5 %    PLATELET 546 219 - 240 K/uL    MPV 12.1 8.9 - 12.9 FL    NRBC 0.0 0  WBC    ABSOLUTE NRBC 0.00 0.00 - 0.01 K/uL    NEUTROPHILS 61 32 - 75 %    BAND NEUTROPHILS 11 (H) 0 - 6 %    LYMPHOCYTES 20 12 - 49 %    MONOCYTES 7 5 - 13 %    EOSINOPHILS 1 0 - 7 %    BASOPHILS 0 0 - 1 %    IMMATURE GRANULOCYTES 0 %    ABS. NEUTROPHILS 8.8 (H) 1.8 - 8.0 K/UL    ABS. LYMPHOCYTES 2.4 0.8 - 3.5 K/UL    ABS. MONOCYTES 0.9 0.0 - 1.0 K/UL    ABS. EOSINOPHILS 0.1 0.0 - 0.4 K/UL    ABS. BASOPHILS 0.0 0.0 - 0.1 K/UL    ABS. IMM. GRANS. 0.0 K/UL    DF MANUAL      RBC COMMENTS NORMOCYTIC, NORMOCHROMIC      WBC COMMENTS TOXIC GRANULATION     METABOLIC PANEL, COMPREHENSIVE    Collection Time: 12/05/22  8:37 PM   Result Value Ref Range    Sodium 141 136 - 145 mmol/L    Potassium 4.5 3.5 - 5.1 mmol/L    Chloride 101 97 - 108 mmol/L    CO2 33 (H) 21 - 32 mmol/L    Anion gap 7 5 - 15 mmol/L    Glucose 82 65 - 100 mg/dL    BUN 36 (H) 6 - 20 MG/DL    Creatinine 1.22 (H) 0.55 - 1.02 MG/DL    BUN/Creatinine ratio 30 (H) 12 - 20      eGFR 52 (L) >60 ml/min/1.73m2    Calcium 9.8 8.5 - 10.1 MG/DL    Bilirubin, total 0.1 (L) 0.2 - 1.0 MG/DL    ALT (SGPT) 40 12 - 78 U/L    AST (SGOT) 43 (H) 15 - 37 U/L    Alk.  phosphatase 128 (H) 45 - 117 U/L    Protein, total 7.7 6.4 - 8.2 g/dL    Albumin 2.9 (L) 3.5 - 5.0 g/dL    Globulin 4.8 (H) 2.0 - 4.0 g/dL    A-G Ratio 0.6 (L) 1.1 - 2.2     TROPONIN-HIGH SENSITIVITY    Collection Time: 12/05/22  8:37 PM   Result Value Ref Range    Troponin-High Sensitivity 7 0 - 51 ng/L   VALPROIC ACID    Collection Time: 12/05/22  9:14 PM   Result Value Ref Range    Valproic acid 36 (L) 50 - 100 ug/ml   URINALYSIS W/ REFLEX CULTURE    Collection Time: 12/05/22  9:21 PM    Specimen: Urine   Result Value Ref Range    Color DARK YELLOW      Appearance CLEAR CLEAR      Specific gravity 1.030 1.003 - 1.030      pH (UA) 5.5 5.0 - 8.0      Protein 30 (A) NEG mg/dL    Glucose Negative NEG mg/dL    Ketone 15 (A) NEG mg/dL    Bilirubin Negative NEG      Blood Negative NEG      Urobilinogen 1.0 0.2 - 1.0 EU/dL    Nitrites Negative NEG      Leukocyte Esterase MODERATE (A) NEG      WBC 5-10 0 - 4 /hpf    RBC 0-5 0 - 5 /hpf    Epithelial cells FEW FEW /lpf    Bacteria Negative NEG /hpf    UA:UC IF INDICATED CULTURE NOT INDICATED BY UA RESULT CNI

## 2022-12-06 NOTE — ED NOTES
E[place] signed and completed, given to xport team, Pt transferred out, hospitalist bedside to sign paperwork.

## 2022-12-06 NOTE — H&P
[address] [facility] Adult  Hospitalist Group  History and Physical    Date of Service:  12/6/2022  Primary Care Provider: Marino Morrow MD  Source of information: Chart review    Chief Complaint: Blood infection      History of Presenting Illness:   Lulu Archuleta is a 56 y.o. 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, hypothyroidism, chronic hypotension on midodrine, bedbound nonverbal came to the ER from a group home to [facility] with concerns for aspiration pneumonia, patient had an episode of vomiting yesterday, decreased mental status today, had a chest x-ray, concern for multifocal pneumonia, admitted to [facility], became hypotensive, put on Levophed, admitted by ICU team and then transferred to the hospitalist team for further management and evaluation, no further history could be obtained from the patient             REVIEW OF SYSTEMS:  Review of systems not obtained due to patient factors. NON VERBAL    Past Medical History:   Diagnosis Date    Constipation     Hypotension     Psychomotor retardation     severe    Seizure disorder [facility])       Past Surgical History:   Procedure Laterality Date    COLONOSCOPY N/A 11/18/2019    COLONOSCOPY performed by Hanny Arreguin MD at [facility]  11/18/2019          Prior to Admission medications    Medication Sig Start Date End Date Taking? Authorizing Provider   divalproex DR (DEPAKOTE) 250 mg tablet Take 250 mg by mouth three (3) times daily. Yes Provider, Historical   scopolamine (TRANSDERM-SCOP) 1 mg over 3 days pt3d 1 Patch by TransDERmal route every seventy-two (72) hours. 11/9/22  Yes Patti Henley MD   levothyroxine (SYNTHROID) 50 mcg tablet Take 1 Tablet by mouth Daily (before breakfast).  8/23/22  Yes Patti Henley MD   midodrine (PROAMATINE) 10 mg tablet TAKE 1 TABLET BY MOUTH IN THE MORNING AND IN THE [place] 6/3/22  Yes Masters, Asuncion Moreland MD   acetaminophen (TylenoL) 325 mg tablet Take 1 Tab by mouth every six (6) hours as needed for Pain. 1/8/21  Yes Augustine Whitley MD   dextromethorphan-guaiFENesin (Robitussin Cough-Chest Narendra DM) 5-100 mg/5 mL liqd Take 5 mL by mouth every four to six (4-6) hours as needed for Cough or Congestion. 1/8/21  Yes Augustine Whitley MD   multivitamin (ONE A DAY) tablet Take 1 Tab by mouth daily. 1/8/21  Yes Augustine Whitley MD   gabapentin (NEURONTIN) 600 mg tablet Take 1,200 mg by mouth three (3) times daily. Yes Provider, Historical   polyethylene glycol (MIRALAX) 17 gram packet Take 17 g by mouth daily. Yes Provider, Historical   docusate sodium (COLACE) 100 mg capsule Take 100 mg by mouth two (2) times daily as needed for Constipation. Yes Provider, Historical     Allergies   Allergen Reactions    Sting, Bee Anaphylaxis    Methsuximide Unknown (comments)     Reaction Type: Allergy        No family history on file. Social History:  reports that she has never smoked. She has never used smokeless tobacco. She reports that she does not drink alcohol and does not use drugs. Family and social history were personally reviewed, all pertinent and relevant details are outlined as above. Objective:   Visit Vitals  /79   Pulse 98   Temp 99.4 °F (37.4 °C)   Resp 14   SpO2 97%      O2 Device: None (Room air)    PHYSICAL EXAM:   General: Nonverbal  HEENT: PEERL, EOMI, moist mucus membranes  Neck: Supple, no JVD, no meningeal signs  Chest: Decreased basal breath sounds  CVS: RRR, S1 S2 heard, no murmurs/rubs/gallops  Abd: Soft, non-tender, non-distended, +bowel sounds   Ext: No clubbing, no cyanosis, no edema  Neuro/Psych: Does not participate in the exam  Cap refill: Brisk, less than 3 seconds  Pulses: 2+, symmetric in all extremities  Skin: Warm, dry, without rashes or lesions    Data Review:    All diagnostic labs and studies have been reviewed. Abnormal Labs Reviewed   METABOLIC PANEL, BASIC - Abnormal; Notable for the following components:       Result Value    Chloride 113 (*)     Anion gap 4 (*)     BUN 22 (*)     BUN/Creatinine ratio 29 (*)     Calcium 8.4 (*)     All other components within normal limits   CBC WITH AUTOMATED DIFF - Abnormal; Notable for the following components:    RBC 2.96 (*)     HGB 9.6 (*)     HCT 29.5 (*)     MCV 99.7 (*)     RDW 16.6 (*)     NEUTROPHILS 79 (*)     All other components within normal limits   MAGNESIUM - Abnormal; Notable for the following components:    Magnesium 1.5 (*)     All other components within normal limits       All Micro Results       Procedure Component Value Units Date/Time    CULTURE, MRSA [[id number]] Collected: 12/06/22 0950    Order Status: Sent Specimen: Nasal Updated: 12/06/22 1230            IMAGING:   No orders to display        ECG/ECHO:    Results for orders placed or performed during the hospital encounter of 12/05/22   EKG, 12 LEAD, INITIAL   Result Value Ref Range    Ventricular Rate 83 BPM    Atrial Rate 83 BPM    P-R Interval 156 ms    QRS Duration 74 ms    Q-T Interval 360 ms    QTC Calculation (Bezet) 423 ms    Calculated P Axis 73 degrees    Calculated R Axis 51 degrees    Calculated T Axis 64 degrees    Diagnosis Normal sinus rhythm  No previous ECGs available           Assessment:   Given the patient's current clinical presentation, there is a high level of concern for decompensation if discharged from the emergency department. Complex decision making was performed, which includes reviewing the patient's available past medical records, laboratory results, and imaging studies.     Aspiration pneumonia  Acute on chronic hypotension  Anemia  Psychomotor retardation  Seizure disorder  Plan:     Patient will be admitted on a telemetry bed, start patient on broad-spectrum IV antibiotics, n.p.o., SLP evaluation, aspiration precautions, IV hydration, oxygen support, pulse ox monitoring, ABG, patient will be treated for HCAP, close monitoring for intervention per hospital course  Status post Levophed, continue midodrine, close monitoring, further intervention per hospital course  Kasi H&H  Fall precautions  Continue home AEDs, seizure precautions, check valproic acid levels, close monitoring    Spoke with Zaynab Canseco, if patient remains stable should be transferred to [facility] in the morning        DIET: DIET NPO Sips of Water with Meds   ISOLATION PRECAUTIONS: There are currently no Active Isolations  CODE STATUS: DNR   DVT PROPHYLAXIS: Heparin  FUNCTIONAL STATUS PRIOR TO HOSPITALIZATION: Completely disabled. Cannot carry on any self-care. Totally confined to bed or chair. Ambulatory status/function: Ambulates with assistance:  Unable to ambulate   EARLY MOBILITY ASSESSMENT: Recommend complete bedrest due to patient's baseline status  ANTICIPATED DISCHARGE: 24-48 hours. ANTICIPATED DISPOSITION: Transfer to another hospital      CRITICAL CARE WAS PERFORMED FOR THIS ENCOUNTER: YES. I had a face to face encounter with the patient, reviewed and interpreted patient data including clinical events, labs, images, vital signs, I/O's, and examined patient. I have discussed the case and the plan and management of the patient's care with the consulting services, the bedside nurses and necessary ancillary providers. This patient has a high probability of imminent, clinically significant deterioration, which requires the highest level of preparedness to intervene urgently. I participated in the decision-making and personally managed or directed the management of the following life and organ supporting interventions that required my frequent assessment to treat or prevent imminent deterioration. I personally spent 50 minutes of critical care time.   This is time spent at this critically ill patient's bedside actively involved in patient care as well as the coordination of care and discussions with the patient's family. This does not include any procedural time which has been billed separately. Signed By: Niyah Giles MD     December 6, 2022         Please note that this dictation may have been completed with Dragon, the computer voice recognition software. Quite often unanticipated grammatical, syntax, homophones, and other interpretive errors are inadvertently transcribed by the computer software. Please disregard these errors. Please excuse any errors that have escaped final proofreading.

## 2022-12-06 NOTE — ED NOTES
Spoke with Pauly Woodall MD and he requested bolus of lactated ringers be given warmed. MD updated on current pt status. Will reassess pt to determine if pt is able to discontinue Levophed. MD requests to reassess pt 1 hour after receiving the warmed LR. Pt is still only responding to pain. Pt BP is stable. Pt still hypothermic. Roni Grace at [employer] setting. Will continue to monitor patient.

## 2022-12-06 NOTE — ED TRIAGE NOTES
Pt is a transfer from [facility] with asp pna. Pt arrives on Levo @ 4 mcg with /69, NSR HR 93. Pt with bruises to knees upon arrival, originally brought in by nursing facility for altered mental status. Pt is nonverbal, bedbound at baseline, pressure ulcers noted to bilateral heels. Pt currently only responds to painful stimuli bilaterally, moves intermittently in the bed. Hypothermic initially, 99.4 rectal temp measured here. Becker, Ostomy in place.

## 2022-12-06 NOTE — PROGRESS NOTES
Admission Medication Reconciliation:    Information obtained from:  [facility]  RxQuery data available¹:  YES    Comments/Recommendations: Updated PTA meds. 1)  med list transcribed directly from paperwork at bedside    2)  Medication changes (since last review):    Removed  - PRN diazepam  - fluticasone  - ketoconazole shampoo  - hydroxyzine    3)  some medications appear to have been changed on admission - ordering NP was contacted to clarify changes in valproate, midodrine and gabapentin dosing. ¹RxQuery pharmacy benefit data reflects medications filled and processed through the patient's insurance, however   this data does NOT capture whether the medication was picked up or is currently being taken by the patient. Allergies:  Sting, bee and Methsuximide    Significant PMH/Disease States:   Past Medical History:   Diagnosis Date    Constipation     Hypotension     Psychomotor retardation     severe    Seizure disorder [facility])      Chief Complaint for this Admission:    Chief Complaint   Patient presents with    Blood infection     Prior to Admission Medications:   Prior to Admission Medications   Prescriptions Last Dose Informant Taking?   acetaminophen (TylenoL) 325 mg tablet 11/6/2022  Yes   Sig: Take 1 Tab by mouth every six (6) hours as needed for Pain. dextromethorphan-guaiFENesin (Robitussin Cough-Chest Narendra DM) 5-100 mg/5 mL liqd 11/6/2022  Yes   Sig: Take 5 mL by mouth every four to six (4-6) hours as needed for Cough or Congestion. divalproex DR (DEPAKOTE) 250 mg tablet 12/5/2022  Yes   Sig: Take 250 mg by mouth three (3) times daily. docusate sodium (COLACE) 100 mg capsule 11/29/2022  Yes   Sig: Take 100 mg by mouth two (2) times daily as needed for Constipation. gabapentin (NEURONTIN) 600 mg tablet 12/5/2022  Yes   Sig: Take 1,200 mg by mouth three (3) times daily. levothyroxine (SYNTHROID) 50 mcg tablet 11/29/2022  Yes   Sig: Take 1 Tablet by mouth Daily (before breakfast). midodrine (PROAMATINE) 10 mg tablet 2022  Yes   Sig: TAKE 1 TABLET BY MOUTH IN THE MORNING AND IN THE EVENING WITH BREAKFAST & DINNER   multivitamin (ONE A DAY) tablet 2022  Yes   Sig: Take 1 Tab by mouth daily. polyethylene glycol (MIRALAX) 17 gram packet 2022  Yes   Sig: Take 17 g by mouth daily. scopolamine (TRANSDERM-SCOP) 1 mg over 3 days pt3d 2022  Yes   Si Patch by TransDERmal route every seventy-two (72) hours. Facility-Administered Medications: None     Please contact the main inpatient pharmacy with any questions or concerns at [phone] and we will direct you to the clinical pharmacist covering this patient's care while in-house.    JILLIAN NavarroD

## 2022-12-06 NOTE — ED NOTES
TRANSFER - OUT REPORT:    Verbal report given to Killian Soliman RN(name) on [address]  being transferred to [facility] ED (unit) for urgent transfer       Report consisted of patients Situation, Background, Assessment and   Recommendations(SBAR). Information from the following report(s) SBAR, Kardex, ED Summary, Procedure Summary, Intake/Output, MAR, Recent Results, Cardiac Rhythm Sinus Rhythm, Alarm Parameters , and Quality Measures was reviewed with the receiving nurse. Lines:   Peripheral IV 12/05/22 Right;Superior Antecubital (Active)       Peripheral IV 12/06/22 Left Wrist (Active)        Opportunity for questions and clarification was provided.       Patient transported with:  Monitor  Levophed at 4mcg/min  NS at 126mL/hr

## 2022-12-06 NOTE — PROGRESS NOTES
Persistent hypotension requiring pressors. Patient being taken back to the ED. I have engaged the transfer center for transfer to a facility with ICU level capabilities. [facility], ED [facility], [address], [place] and [address] with no ICU beds. No Step down beds at [facility]. [facility] requesting potential ED to ED transfer may be possible. Dr Liza Fernando in the ED has consented to help with the transfer; however requests that I may also be able to transfer ED to ED. Called transfer center again - they will work on ED to ED t/nico and call me back. For now patient still in ED on pressors; will continue to monitor. Addendum at 4:25 AM: Spoke with ICU physician at [facility]. Reviewed case. Recommending aggressive fluid hydration (at least 1L LR) + aggressive rewarming and evaluate whether patient can come off pressors. Will recheck in an hour and update the ICU team. Spoke with and updated RN.

## 2022-12-06 NOTE — H&P
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 PCXR was consistent with Multi focal Pneumonia on centrilobular emphysema and labs were notable for a PCT 0.29 and WBC 12.2. She was admitted to the facility there and became hypotensive requiring Levophed gtt. As they do not have an ICU she was transferred to [facility] for ICU level care to ER pending bed availability. Patient's father Sean Julian is #[phone]    Past Medical History:      has a past medical history of Constipation, Hypotension, Psychomotor retardation, and Seizure disorder ([address] Past Surgical History:      has a past surgical history that includes sigmoidoscopy,diagnostic (2019) and colonoscopy (N/A, 2019). Home Medications:     Prior to Admission medications    Medication Sig Start Date End Date Taking? Authorizing Provider   scopolamine (TRANSDERM-SCOP) 1 mg over 3 days pt3d 1 Patch by TransDERmal route every seventy-two (72) hours. 22   Patricia Henley MD   fluticasone propionate (FLONASE) 50 mcg/actuation nasal spray 2 Sprays by Both Nostrils route daily as needed for Rhinitis or Allergies. Indications: inflammation of the nose due to an allergy 22   Patricia Henley MD   levothyroxine (SYNTHROID) 50 mcg tablet Take 1 Tablet by mouth Daily (before breakfast).  22   Patricia Henley MD   midodrine (PROAMATINE) 10 mg tablet TAKE 1 TABLET BY MOUTH IN THE MORNING AND IN THE EVENING WITH BREAKFAST & DINNER 6/3/22   MastersBen MD   psyllium seed-dextrose (Reguloid) powd Take 1 Scoop by mouth daily as needed for Other (constipation). One-time fill as  leaving [facility]. 3/23/21   Reva Chand MD   acetaminophen (TylenoL) 325 mg tablet Take 1 Tab by mouth every six (6) hours as needed for Pain. 1/8/21   Reva Chand MD   dextromethorphan-guaiFENesin (Robitussin Cough-Chest Narendra DM) 5-100 mg/5 mL liqd Take 5 mL by mouth every four to six (4-6) hours as needed for Cough or Congestion. 1/8/21   Reva Chand MD   melatonin 3 mg tablet Take 1 Tab by mouth nightly as needed for Insomnia. 1/8/21   Reva Chand MD   multivitamin (ONE A DAY) tablet Take 1 Tab by mouth daily. 1/8/21   Reva Chand MD   bisacodyL (DULCOLAX) 10 mg supp Insert 10 mg into rectum daily as needed for Constipation. 1/8/21   Reva Chand MD   hydrocortisone (HYTONE) 2.5 % ointment Apply  to affected area two (2) times daily as needed for Skin Irritation. use thin layer   Indications: To face 1/8/21   Reva Chand MD   divalproex sodium (DIVALPROEX PO) Take 250 mg by mouth three (3) times daily (with meals). Provider, Historical   gabapentin (NEURONTIN) 600 mg tablet Take 600 mg by mouth three (3) times daily. Indications: 2 tab by mouth    Provider, Historical   polyethylene glycol (MIRALAX) 17 gram packet Take 17 g by mouth daily. Provider, Historical   diazePAM (VALIUM) 5-7.5-10 mg kit Insert 10 mg into rectum as needed (As needed for seizure lasting 3 minutes or 2 within 30 minutes of each other). Provider, Historical   diazePAM (VALIUM) 5 mg tablet Take 5 mg by mouth every six (6) hours as needed for Anxiety (PRN for procedures). Provider, Historical   docusate sodium (COLACE) 100 mg capsule Take 100 mg by mouth two (2) times daily as needed for Constipation.     Provider, Historical   hydrOXYzine HCl (ATARAX) 25 mg tablet Take  by mouth every six (6) hours as needed for Itching. Provider, Historical   ketoconazole (NIZORAL) 2 % shampoo Apply  to affected area three (3) days a week. Provider, Historical   magnesium citrate solution Take 150 mL by mouth as needed. Provider, Historical       Allergies/Social/Family History: Allergies   Allergen Reactions    Sting, Bee Anaphylaxis    Methsuximide Unknown (comments)     Reaction Type: Allergy        Social History     Tobacco Use    Smoking status: Never    Smokeless tobacco: Never   Substance Use Topics    Alcohol use: Never      No family history on file. Review of Systems:     Review of systems not obtained due to patient factors. Objective:   Vital Signs:  Visit Vitals  /69 (BP 1 Location: Left upper arm, BP Patient Position: At rest)   Pulse 97   Temp 99.4 °F (37.4 °C)   Resp 16   SpO2 94%      O2 Device: None (Room air) Temp (24hrs), Av.4 °F (35.2 °C), Min:91.2 °F (32.9 °C), Max:99.4 °F (37.4 °C)           Intake/Output:   No intake or output data in the 24 hours ending 22 0905    Physical Exam:    General:  Drowsy, arouses spontaneously, appears younger than stated age   Eyes:  Sclera anicteric, swollen Pupils equally round and reactive to light. Mouth/Throat: Mucous membranes normal, oral pharynx clear   Neck: Supple   Lungs:   Clear/dim to auscultation bilaterally, good effort, room air   CV:  Regular rate and rhythm,no murmur, click, rub or gallop   Abdomen:   Soft, non-tender. bowel sounds normal. non-distended. Colostomy to left MQ soft brown stool   Extremities: No cyanosis or edema   Skin: Skin color, texture, turgor normal. no acute rash or lesions.  Bilateral heels with DTI present on arrival.    Lymph nodes: Cervical and supraclavicular normal   Musculoskeletal: No swelling, deformity of extremities   Lines/Devices:  Intact, no erythema, drainage or tenderness   Psych: Drowsy, arouses spontaneously, does not verbalize or follow commands (baseline per report)     LABS AND  DATA: Personally reviewed  Recent Labs     12/06/22 0559 12/05/22 2037   WBC 9.2 12.2*   HGB 8.2* 11.0*   HCT 24.8* 32.8*   PLT 99* 223     Recent Labs     12/06/22 0559 12/05/22 2037    141   K 4.0 4.5    101   CO2 28 33*   BUN 26* 36*   CREA 0.77 1.22*   * 82   CA 7.8* 9.8     Recent Labs     12/06/22 0559 12/05/22 2037   AP 88 128*   TP 5.3* 7.7   ALB 1.9* 2.9*   GLOB 3.4 4.8*     No results for input(s): INR, PTP, APTT, INREXT in the last 72 hours. No results for input(s): PHI, PCO2I, PO2I, FIO2I in the last 72 hours. No results for input(s): CPK, CKMB, TROIQ, BNPP in the last 72 hours. MEDS: Reviewed    Chest X-Ray:  CXR Results  (Last 48 hours)                 12/05/22 2017  XR CHEST PORT Final result    Impression:  Bilateral lower lung zone infiltrates concerning for infection or aspiration. Narrative:  EXAM: XR CHEST PORT       DATE: 12/5/2022 8:17 PM       INDICATION: chest pain       COMPARISON: None. FINDINGS: AP portable chest radiograph. Examination is slightly limited by chin   positioning which obscures the LEFT apex. The lungs are adequately expanded. The   heart size is normal. There are bilateral lower lung zone infiltrates. No   definite effusion or pneumothorax is seen. CT Results  (Last 48 hours)                 12/05/22 2258  CT HEAD WO CONT Final result    Impression:  No acute intracranial process. Narrative:  CLINICAL HISTORY: ams   INDICATION: ams   COMPARISON: 2012. CT dose reduction was achieved through use of a standardized protocol tailored   for this examination and automatic exposure control for dose modulation. TECHNIQUE: Serial axial images with a collimation of 5 mm were obtained from the   skull base through the vertex     FINDINGS:    The sulci and ventricles are within normal limits for patient age. There is no   evidence of an acute infarction, hemorrhage, or mass-effect.  There is no   evidence of midline shift or hydrocephalus. Posterior fossa structures are   unremarkable. No extra-axial collections are seen. Mastoid air cells are well pneumatized and clear. Motion artifact limits exam.           12/05/22 2200  CT CHEST WO CONT Final result    Impression:  Imaging findings consistent with a multifocal pneumonia superimposed on   centrilobular emphysema. Follow-up chest CT in 6-8 weeks to evaluate for resolution of pulmonary   nodularity is recommended. Age indeterminate compression deformities at T7, T9 and T11. Further delineation   can be obtained with MRI of the thoracic spine as clinically warranted. Narrative:  Clinical history: cough   INDICATION:   cough   COMPARISON:  None       TECHNIQUE:    Thin axial images were obtained through the chest. Coronal and sagittal   reconstructions were generated. Oral contrast was not administered. CT dose reduction was achieved through use of a standardized protocol tailored   for this examination and automatic exposure control for dose modulation. Adaptive statistical iterative reconstruction (ASIR) was utilized. FINDINGS:        SUPRACLAVICULAR REGION: No supraclavicular adenopathy. MEDIASTINUM: There is suggestion of anemia. No hilar or mediastinal   lymphadenopathy. No esophageal mass. No endotracheal or endobronchial mass. PLEURA/LUNGS[de-identified] There are scattered groundglass and nodular opacities   predominantly in the right middle lobe and left upper lobe but also in the right   and left lower lobe. There is no pleural effusion. There is centrilobular   emphysematous change. SOFT TISSUE/ AXILLA: No axillary adenopathy. No chest wall mass. UPPER ABDOMEN: No mass or biliary dilatation. There is no intrahepatic duct   dilatation. The CBD is not dilated. BONES: T7, T9 and T11 vertebral compression deformities are age indeterminate. No lytic or blastic lesions. No evidence of fracture or dislocation. ABCDEF Bundle/Checklist Completed: Yes    Active Problem List:     Problem List  Date Reviewed: 11/29/2022            Codes Class    Hospital-acquired pneumonia ICD-10-CM: J18.9, Y95  ICD-9-CM: 5         Pneumonia ICD-10-CM: J18.9  ICD-9-CM: 5         Seizure disorder ([facility] [address] ICD-10-CM: G40.909  ICD-9-CM: 345.90         Hypotension ICD-10-CM: I95.9  ICD-9-CM: 458.9         Constipation ICD-10-CM: K59.00  ICD-9-CM: 564.00         Psychomotor retardation ICD-10-CM: R41.843  ICD-9-CM: 799.54          ICU Assessment/ Comprehensive Plan of Care:     NEUROLOGIC  1) Seizure disorder 2/2 TBI   -Continue Valproic acid 250mg IV Q6hr. Increased from Q8hr (home dose) for low VA level 36  -Recheck VA level 12/8 (ordered)  -PRN Valium/ atarax as needed for anxiety per home meds  -Seizure precautions    PULMONARY  1) Multifocal PNA on centrilobular emphysema, possible aspiration pneumonia  -Cefepime 12/6-  -Inf A/B/Covid 19 NEG  -CT chest done 12/5  -Goal SpO2>=92, pH>=7.30  -Trend ABG / CXR  -Albuterol / Atrovent Q4hr  -Chect physiotherapy  -ST eval for swallowing safety    CARDIOVASCULAR  1) Hypotension 2/2 sepsis.  Has chronic hyponatremia per chart review on midodrine  -Goal MAP>=65  -Levophed gtt for BP support  -Continue midodrine once able to take PO or DHT in place  -EKG PRN    RENAL  1) No acute renal concerns  2) Hypomagnesemia           Goal K>=4, Mg>=2, Phos >3  Trend BMP, Mg, P  Replace lytes per protocol  LR@ 75 ml/hr    GASTROINTESTINAL  1) Possible aspiration  2) Hx of colostomy s/p colectomy for ischemic bowel  -Nutrition: NPO  -ST eval, FEES as needed  Bowel regimen, hx of costipation per med review  -Miralax daily daily  -bisacodyl PRN  GI Px  -Pepcid 20mg IV Q12hr (D/c once taking po or TF at goal)    HEMATOLOGIC/ONC  1) No acute concerns  -Goal Hb>=7  -Trend CBC,PTT/INR  -Enoxaparin, AC/AP   -SCDs    INFECTIOUS  1) Leukocytosis, resolved  2) Multifocal pneumonia, suspect 2/2 aspiration  -Trend fevers, WBC  -INF A/B NEG/ COVID 19 NEG        ENDOCRINE   1) Hx Hypothyroidism  -Continue home dose levothyroxine  -Goal CBG<=180    DISPOSITION/COMMUNICATION  Discussed Plan of Care/Code Status: Do Not Resuscitate  Stay in ICU while on vasopressors    CRITICAL CARE CONSULTANT NOTE  I had a face to face encounter with the patient, reviewed and interpreted patient data including clinical events, labs, images, vital signs, I/O's, and examined patient. I have discussed the case and the plan and management of the patient's care with the consulting services, the bedside nurses and the respiratory therapist.      NOTE OF PERSONAL INVOLVEMENT IN CARE   This patient has a high probability of imminent, clinically significant deterioration, which requires the highest level of preparedness to intervene urgently. I participated in the decision-making and personally managed or directed the management of the following life and organ supporting interventions that required my frequent assessment to treat or prevent imminent deterioration. I personally spent 50 minutes of critical care time. This is time spent at this critically ill patient's bedside actively involved in patient care as well as the coordination of care and discussions with the patient's family. This does not include any procedural time which has been billed separately.     LICHA Cartagena-BC  Intensivist Nurse Practitioner  [facility] [place] [facility]  12/6/2022

## 2022-12-06 NOTE — ED NOTES
TRANSFER - OUT REPORT:    Verbal report given to [facility] (name) on Lotus Barroso  being transferred to [facility]) for routine progression of care       Report consisted of patients Situation, Background, Assessment and   Recommendations(SBAR). Information from the following report(s) SBAR, Kardex, [facility] and Recent Results was reviewed with the receiving nurse. Lines:   Peripheral IV 12/06/22 Left Antecubital (Active)   Site Assessment Clean, dry, & intact 12/06/22 1002   Phlebitis Assessment 0 12/06/22 1002   Infiltration Assessment 0 12/06/22 1002   Dressing Status Clean, dry, & intact 12/06/22 1002   Dressing Type Transparent 12/06/22 1002   Alcohol Cap Used No 12/06/22 1002        Opportunity for questions and clarification was provided.       Patient transported with:   [facility]

## 2022-12-06 NOTE — ED NOTES
Verbal shift change report given to Dyan Joseph RN (oncoming nurse) by Trecia Sacks, RN (offgoing nurse). Report included the following information SBAR, ED Summary, MAR, and Recent Results.

## 2022-12-06 NOTE — DISCHARGE SUMMARY
Physician Discharge Summary     Patient ID:  Vj Short  [id number]  56 y.o.  1965    Admit date: 12/5/2022    Discharge date and time: 12/6/2022    Admission Diagnoses: Pneumonia [J18.9]    Discharge Diagnoses:     Septic shock from multilobar pneumonia  Suspect aspiration pneumonia  Acute metabolic encephalopathy   Acute kidney injury  Seizure disorder  Nonverbal at baseline  Bedbound at baseline  Impaired mental capability  Group home patient    HPI: Ward Horowitz is a 56 y.o. rather unfortunate group home patient who was brought into the emergency room today apparently by caregiver from the group home. History was obtained from ED physician as well as RN. However, history was very limited. Patient apparently had nausea yesterday, and had decreased mentation today. Was also noted that patient is nonverbal at baseline. I requested to speak with the patient's caregiver and the nurse gave me a phone number of [phone]. Jo Guardian, patient's dad actually picked up the phone. He was surprised that patient was in the hospital.  He tells me that patient just started at this group home last Thursday, less than 1 week ago. He reported that they had told him that patient will be taken to another hospital if she becomes sick. 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 She has a seizure disorder but it has been a while since patient had a seizure. He further reported that last time patient was however sick and in the hospital was approximately 2 years ago. He tells me patient is on puréed diet. Initial work-up in the emergency room revealed a mild leukocytosis of 12.2 as well as a chest x-ray reporting bilateral infiltrate. During my initial conversation with ED physician, I requested a CT of the head as well as CT of the chest with ABG given bicarbonate of greater than 30, and patient's altered presentation. I also requested a urinalysis.   CT of the head had no acute pathology, however CT of the chest revealing multifocal pneumonia on emphysema. ABG with no noted hypercapnia. Urinalysis not consistent with acute urinary infection. Patient was given Rocephin and doxycycline in the emergency room. During my virtual evaluation of the patient, she was arousable to tactile stimuli but otherwise falls back to sleep. Per the RN, she had just been administered benzodiazepine. After initial acceptance of patient and admission orders were written, with patient still been in the emergency room, was notified that patient was hypotensive with systolic blood pressures in the 60s. IV fluid bolus was ordered as well as commencement of Levophed. Hospital Course: Patient's presentation was consistent with septic shock from pneumonia. Suspecting aspiration pneumonia. Aggressively treated with antibiotics and rewarming, hydrated with IV fluids but nonetheless still required pressors. Discussed with intensivist Dr. Logan Sheridan was excepted patient for transfer to [facility] ICU. Patient will be boarded in the ED as no beds currently in the ICU; I did speak with the emergency room physician at [facility]. PCP: Bishop Gilman MD     Consults: Intensivist    Condition of patient at discharge: fair    Discharge Exam:    Physical Exam:  Gen: Arousable to tactile stimuli but falls back to sleep. Chronically ill-appearing. HEENT:  NC/AT. Resp:  Unlabored breathing. Clear breath sounds with good air entry. No wheezes rales or rhonchi. Card:  Regular rate and rhythm. Normal S1 and S2. Abd:  Non-distended. Neuro: Arousable to tactile stimuli but otherwise very limited exam.    Disposition: Acute facility/ICU    Patient Instructions:   Current Discharge Medication List        CONTINUE these medications which have NOT CHANGED    Details   scopolamine (TRANSDERM-SCOP) 1 mg over 3 days pt3d 1 Patch by TransDERmal route every seventy-two (72) hours.   Qty: 24 Patch, Refills: 5      fluticasone propionate (FLONASE) 50 mcg/actuation nasal spray 2 Sprays by Both Nostrils route daily as needed for Rhinitis or Allergies. Indications: inflammation of the nose due to an allergy  Qty: 1 Each, Refills: 5      levothyroxine (SYNTHROID) 50 mcg tablet Take 1 Tablet by mouth Daily (before breakfast). Qty: 90 Tablet, Refills: 4      midodrine (PROAMATINE) 10 mg tablet TAKE 1 TABLET BY MOUTH IN THE MORNING AND IN THE EVENING WITH BREAKFAST & DINNER  Qty: 60 Tablet, Refills: 11      psyllium seed-dextrose (Reguloid) powd Take 1 Scoop by mouth daily as needed for Other (constipation). One-time fill as  leaving [facility]. Qty: 3 Bottle, Refills: 0    Comments: One-time fill as  leaving [facility] clinic.      acetaminophen (TylenoL) 325 mg tablet Take 1 Tab by mouth every six (6) hours as needed for Pain. Qty: 100 Tab, Refills: 11      dextromethorphan-guaiFENesin (Robitussin Cough-Chest Narendra DM) 5-100 mg/5 mL liqd Take 5 mL by mouth every four to six (4-6) hours as needed for Cough or Congestion. Qty: 1 Bottle, Refills: 11      melatonin 3 mg tablet Take 1 Tab by mouth nightly as needed for Insomnia. Qty: 90 Tab, Refills: 3      multivitamin (ONE A DAY) tablet Take 1 Tab by mouth daily. Qty: 90 Tab, Refills: 3      bisacodyL (DULCOLAX) 10 mg supp Insert 10 mg into rectum daily as needed for Constipation. Qty: 90 Suppository, Refills: 3      hydrocortisone (HYTONE) 2.5 % ointment Apply  to affected area two (2) times daily as needed for Skin Irritation. use thin layer   Indications: To face  Qty: 30 g, Refills: 11      divalproex sodium (DIVALPROEX PO) Take 250 mg by mouth three (3) times daily (with meals). gabapentin (NEURONTIN) 600 mg tablet Take 600 mg by mouth three (3) times daily. Indications: 2 tab by mouth      polyethylene glycol (MIRALAX) 17 gram packet Take 17 g by mouth daily.       diazePAM (VALIUM) 5-7.5-10 mg kit Insert 10 mg into rectum as needed (As needed for seizure lasting 3 minutes or 2 within 30 minutes of each other). diazePAM (VALIUM) 5 mg tablet Take 5 mg by mouth every six (6) hours as needed for Anxiety (PRN for procedures). docusate sodium (COLACE) 100 mg capsule Take 100 mg by mouth two (2) times daily as needed for Constipation. hydrOXYzine HCl (ATARAX) 25 mg tablet Take  by mouth every six (6) hours as needed for Itching.      ketoconazole (NIZORAL) 2 % shampoo Apply  to affected area three (3) days a week.      magnesium citrate solution Take 150 mL by mouth as needed. Activity: Bedrest  Diet: NPO    Approximate time spent in patient care on day of discharge: 40 minutes.     Signed:  Godwin Rollins MD  12/6/2022  6:15 AM

## 2022-12-07 LAB
ACC. NO. FROM MICRO ORDER, ACCP: ABNORMAL
ACINETOBACTER CALCOACETICUS-BAUMANII COMPLEX, ACBCX: NOT DETECTED
ANION GAP SERPL CALC-SCNC: 8 MMOL/L (ref 5–15)
ATRIAL RATE: 83 BPM
BACTERIA SPEC CULT: NORMAL
BACTEROIDES FRAGILIS, BFRA: NOT DETECTED
BASOPHILS # BLD: 0 K/UL (ref 0–0.1)
BASOPHILS NFR BLD: 0 % (ref 0–1)
BIOFIRE COMMENT, BCIDPF: ABNORMAL
BUN SERPL-MCNC: 18 MG/DL (ref 6–20)
BUN/CREAT SERPL: 31 (ref 12–20)
C GLABRATA DNA VAG QL NAA+PROBE: NOT DETECTED
CALCIUM SERPL-MCNC: 8.9 MG/DL (ref 8.5–10.1)
CALCULATED P AXIS, ECG09: 73 DEGREES
CALCULATED R AXIS, ECG10: 51 DEGREES
CALCULATED T AXIS, ECG11: 64 DEGREES
CANDIDA ALBICANS: NOT DETECTED
CANDIDA AURIS, CAAU: NOT DETECTED
CANDIDA KRUSEI, CKRP: NOT DETECTED
CANDIDA PARAPSILOSIS, CPAUP: NOT DETECTED
CANDIDA TROPICALIS, CTROP: NOT DETECTED
CHLORIDE SERPL-SCNC: 105 MMOL/L (ref 97–108)
CO2 SERPL-SCNC: 26 MMOL/L (ref 21–32)
CREAT SERPL-MCNC: 0.58 MG/DL (ref 0.55–1.02)
CRYPTO NEOFORMANS/GATTII, CRYNEG: NOT DETECTED
DIAGNOSIS, 93000: NORMAL
DIFFERENTIAL METHOD BLD: ABNORMAL
ENTEROBACTER CLOACAE COMPLEX, ECCP: NOT DETECTED
ENTEROBACTERALES SP. , ENBLS: NOT DETECTED
ENTEROCOCCUS FAECALIS, ENFA: NOT DETECTED
ENTEROCOCCUS FAECIUM, ENFAM: NOT DETECTED
EOSINOPHIL # BLD: 0 K/UL (ref 0–0.4)
EOSINOPHIL NFR BLD: 0 % (ref 0–7)
ERYTHROCYTE [DISTWIDTH] IN BLOOD BY AUTOMATED COUNT: 16.1 % (ref 11.5–14.5)
ESCHERICHIA COLI: NOT DETECTED
GLUCOSE BLD STRIP.AUTO-MCNC: 109 MG/DL (ref 65–117)
GLUCOSE BLD STRIP.AUTO-MCNC: 111 MG/DL (ref 65–117)
GLUCOSE BLD STRIP.AUTO-MCNC: 194 MG/DL (ref 65–117)
GLUCOSE BLD STRIP.AUTO-MCNC: 48 MG/DL (ref 65–117)
GLUCOSE BLD STRIP.AUTO-MCNC: 52 MG/DL (ref 65–117)
GLUCOSE SERPL-MCNC: 50 MG/DL (ref 65–100)
HAEMOPHILUS INFLUENZAE, HMI: NOT DETECTED
HCT VFR BLD AUTO: 27.9 % (ref 35–47)
HGB BLD-MCNC: 9.3 G/DL (ref 11.5–16)
IMM GRANULOCYTES # BLD AUTO: 0 K/UL (ref 0–0.04)
IMM GRANULOCYTES NFR BLD AUTO: 0 % (ref 0–0.5)
KLEBSIELLA AEROGENES, KLAE: NOT DETECTED
KLEBSIELLA OXYTOCA: NOT DETECTED
KLEBSIELLA PNEUMONIAE GROUP, KPPG: NOT DETECTED
LISTERIA MONOCYTOGENES, LMONP: NOT DETECTED
LYMPHOCYTES # BLD: 1.5 K/UL (ref 0.8–3.5)
LYMPHOCYTES NFR BLD: 17 % (ref 12–49)
MAGNESIUM SERPL-MCNC: 1.5 MG/DL (ref 1.6–2.4)
MCH RBC QN AUTO: 31.7 PG (ref 26–34)
MCHC RBC AUTO-ENTMCNC: 33.3 G/DL (ref 30–36.5)
MCV RBC AUTO: 95.2 FL (ref 80–99)
MONOCYTES # BLD: 0.8 K/UL (ref 0–1)
MONOCYTES NFR BLD: 9 % (ref 5–13)
NEISSERIA MENINGITIDIS, NMNI: NOT DETECTED
NEUTS SEG # BLD: 6.3 K/UL (ref 1.8–8)
NEUTS SEG NFR BLD: 73 % (ref 32–75)
NRBC # BLD: 0 K/UL (ref 0–0.01)
NRBC BLD-RTO: 0 PER 100 WBC
P-R INTERVAL, ECG05: 156 MS
PHOSPHATE SERPL-MCNC: 3.3 MG/DL (ref 2.6–4.7)
PLATELET # BLD AUTO: 171 K/UL (ref 150–400)
PMV BLD AUTO: 11.9 FL (ref 8.9–12.9)
POTASSIUM SERPL-SCNC: 4.3 MMOL/L (ref 3.5–5.1)
PROTEUS, PRP: NOT DETECTED
PSEUDOMONAS AERUGINOSA: NOT DETECTED
Q-T INTERVAL, ECG07: 360 MS
QRS DURATION, ECG06: 74 MS
QTC CALCULATION (BEZET), ECG08: 423 MS
RBC # BLD AUTO: 2.93 M/UL (ref 3.8–5.2)
RESISTANT GENE SPACE, REGENE: ABNORMAL
SALMONELLA, SALMO: NOT DETECTED
SERRATIA MARCESCENS: NOT DETECTED
SERVICE CMNT-IMP: ABNORMAL
SERVICE CMNT-IMP: NORMAL
SODIUM SERPL-SCNC: 139 MMOL/L (ref 136–145)
STAPH EPIDERMIDIS, STEP: NOT DETECTED
STAPH LUGDUNENSIS, STALUG: NOT DETECTED
STAPHYLOCOCCUS AUREUS: NOT DETECTED
STAPHYLOCOCCUS, STAPP: DETECTED
STENO MALTOPHILIA, STMA: NOT DETECTED
STREPTOCOCCUS , STPSP: NOT DETECTED
STREPTOCOCCUS AGALACTIAE (GROUP B): NOT DETECTED
STREPTOCOCCUS PNEUMONIAE , SPNP: NOT DETECTED
STREPTOCOCCUS PYOGENES (GROUP A), SPYOP: NOT DETECTED
VENTRICULAR RATE, ECG03: 83 BPM
WBC # BLD AUTO: 8.6 K/UL (ref 3.6–11)

## 2022-12-07 PROCEDURE — 74011000250 HC RX REV CODE- 250: Performed by: NURSE PRACTITIONER

## 2022-12-07 PROCEDURE — 36415 COLL VENOUS BLD VENIPUNCTURE: CPT

## 2022-12-07 PROCEDURE — 85025 COMPLETE CBC W/AUTO DIFF WBC: CPT

## 2022-12-07 PROCEDURE — 82962 GLUCOSE BLOOD TEST: CPT

## 2022-12-07 PROCEDURE — 84100 ASSAY OF PHOSPHORUS: CPT

## 2022-12-07 PROCEDURE — 74011000258 HC RX REV CODE- 258: Performed by: NURSE PRACTITIONER

## 2022-12-07 PROCEDURE — 83735 ASSAY OF MAGNESIUM: CPT

## 2022-12-07 PROCEDURE — 74011250636 HC RX REV CODE- 250/636: Performed by: HOSPITALIST

## 2022-12-07 PROCEDURE — 74011250637 HC RX REV CODE- 250/637: Performed by: HOSPITALIST

## 2022-12-07 PROCEDURE — 74011000258 HC RX REV CODE- 258: Performed by: HOSPITALIST

## 2022-12-07 PROCEDURE — 65660000001 HC RM ICU INTERMED STEPDOWN

## 2022-12-07 PROCEDURE — 74011250636 HC RX REV CODE- 250/636: Performed by: NURSE PRACTITIONER

## 2022-12-07 PROCEDURE — 80048 BASIC METABOLIC PNL TOTAL CA: CPT

## 2022-12-07 PROCEDURE — 74011000250 HC RX REV CODE- 250: Performed by: HOSPITALIST

## 2022-12-07 PROCEDURE — 92610 EVALUATE SWALLOWING FUNCTION: CPT

## 2022-12-07 RX ORDER — MIDODRINE HYDROCHLORIDE 5 MG/1
10 TABLET ORAL
Status: DISCONTINUED | OUTPATIENT
Start: 2022-12-07 | End: 2022-12-22 | Stop reason: HOSPADM

## 2022-12-07 RX ORDER — DEXTROSE MONOHYDRATE AND SODIUM CHLORIDE 5; .9 G/100ML; G/100ML
75 INJECTION, SOLUTION INTRAVENOUS CONTINUOUS
Status: DISCONTINUED | OUTPATIENT
Start: 2022-12-07 | End: 2022-12-09

## 2022-12-07 RX ORDER — MAGNESIUM SULFATE 1 G/100ML
1 INJECTION INTRAVENOUS ONCE
Status: COMPLETED | OUTPATIENT
Start: 2022-12-07 | End: 2022-12-07

## 2022-12-07 RX ORDER — GABAPENTIN 600 MG/1
1200 TABLET ORAL 3 TIMES DAILY
Status: DISCONTINUED | OUTPATIENT
Start: 2022-12-07 | End: 2022-12-22 | Stop reason: HOSPADM

## 2022-12-07 RX ORDER — ENOXAPARIN SODIUM 100 MG/ML
30 INJECTION SUBCUTANEOUS DAILY
Status: DISCONTINUED | OUTPATIENT
Start: 2022-12-08 | End: 2022-12-09

## 2022-12-07 RX ORDER — BALSAM PERU/CASTOR OIL
OINTMENT (GRAM) TOPICAL EVERY 8 HOURS
Status: DISCONTINUED | OUTPATIENT
Start: 2022-12-07 | End: 2022-12-22 | Stop reason: HOSPADM

## 2022-12-07 RX ORDER — MAGNESIUM SULFATE HEPTAHYDRATE 40 MG/ML
2 INJECTION, SOLUTION INTRAVENOUS ONCE
Status: COMPLETED | OUTPATIENT
Start: 2022-12-07 | End: 2022-12-07

## 2022-12-07 RX ADMIN — ENOXAPARIN SODIUM 40 MG: 100 INJECTION SUBCUTANEOUS at 09:07

## 2022-12-07 RX ADMIN — GABAPENTIN 1200 MG: 600 TABLET, FILM COATED ORAL at 18:14

## 2022-12-07 RX ADMIN — CEFEPIME 2 G: 2 INJECTION, POWDER, FOR SOLUTION INTRAVENOUS at 19:30

## 2022-12-07 RX ADMIN — DEXTROSE AND SODIUM CHLORIDE 75 ML/HR: 5; 900 INJECTION, SOLUTION INTRAVENOUS at 06:27

## 2022-12-07 RX ADMIN — MAGNESIUM SULFATE 1 G: 1 INJECTION INTRAVENOUS at 18:55

## 2022-12-07 RX ADMIN — CEFEPIME 2 G: 2 INJECTION, POWDER, FOR SOLUTION INTRAVENOUS at 11:03

## 2022-12-07 RX ADMIN — DEXTROSE MONOHYDRATE 250 ML: 100 INJECTION, SOLUTION INTRAVENOUS at 05:54

## 2022-12-07 RX ADMIN — CEFEPIME 2 G: 2 INJECTION, POWDER, FOR SOLUTION INTRAVENOUS at 04:27

## 2022-12-07 RX ADMIN — Medication: at 19:00

## 2022-12-07 RX ADMIN — SODIUM CHLORIDE, PRESERVATIVE FREE 10 ML: 5 INJECTION INTRAVENOUS at 22:20

## 2022-12-07 RX ADMIN — MIDODRINE HYDROCHLORIDE 10 MG: 5 TABLET ORAL at 18:09

## 2022-12-07 RX ADMIN — SODIUM CHLORIDE, POTASSIUM CHLORIDE, SODIUM LACTATE AND CALCIUM CHLORIDE 75 ML/HR: 600; 310; 30; 20 INJECTION, SOLUTION INTRAVENOUS at 04:26

## 2022-12-07 RX ADMIN — SODIUM CHLORIDE 250 MG: 9 INJECTION, SOLUTION INTRAVENOUS at 22:16

## 2022-12-07 RX ADMIN — SODIUM CHLORIDE 250 MG: 9 INJECTION, SOLUTION INTRAVENOUS at 10:46

## 2022-12-07 RX ADMIN — MAGNESIUM SULFATE HEPTAHYDRATE 2 G: 40 INJECTION, SOLUTION INTRAVENOUS at 12:03

## 2022-12-07 RX ADMIN — DEXTROSE AND SODIUM CHLORIDE 75 ML/HR: 5; 900 INJECTION, SOLUTION INTRAVENOUS at 22:13

## 2022-12-07 RX ADMIN — SODIUM CHLORIDE, PRESERVATIVE FREE 10 ML: 5 INJECTION INTRAVENOUS at 18:59

## 2022-12-07 NOTE — PROGRESS NOTES
Physician Progress Note      Tammy Rondon  CSN #:                  [id number]  :                       1965  ADMIT DATE:       2022 8:27 AM  DISCH DATE:  RESPONDING  PROVIDER #:        Abigail Julian MD        QUERY TEXT:    Type of Anemia: Please provide further specificity, if known. Clinical indicators include: rbc, hgb, hct, nemia, h&h  Options provided:  -- Anemia due to acute blood loss  -- Anemia due to chronic blood loss  -- Anemia due to iron deficiency  -- Anemia due to postoperative blood loss  -- Anemia due to chronic disease  -- Other - I will add my own diagnosis  -- Disagree - Not applicable / Not valid  -- Disagree - Clinically Unable to determine / Unknown        PROVIDER RESPONSE TEXT:    The patient has anemia due to chronic blood loss.       Electronically signed by:  Abigail Julian MD 2022 11:47 AM

## 2022-12-07 NOTE — PROGRESS NOTES
0830  PO Meds held due to patient condition. Pharmacy switched PO Valproate to IV and dose corrected for IV. MD aware. Speech Consulted. 1340 half of 2g dose magnesium sulfate wasted on accident, doctor notified and half dose ordered to be administered. 1730 attempt to crush medications and give with applesauce.

## 2022-12-07 NOTE — PROGRESS NOTES
Problem: Dysphagia (Adult)  Goal: *Acute Goals and Plan of Care (Insert Text)  Description: Speech Pathology Goals  Initiated 12/7/2022    1. Patient will tolerate baseline diet with no difficulties or adverse effects within 7 days. Outcome: Progressing Towards Goal     SPEECH LANGUAGE PATHOLOGY BEDSIDE SWALLOW EVALUATION  Patient: Maddie Sims (56 y.o. female)  Date: 12/7/2022  Primary Diagnosis: Hospital-acquired pneumonia [J18.9, Y95]       Precautions:        ASSESSMENT :  Patient is bedbound and nonverbal, she presented from a group home to [facility] with concerns for aspiration pneumonia. Patient had an episode of vomiting then decreased mental status. X-ray showing concern for multifocal pneumonia. She was admitted to [facility], became hypotensive and was transferred to [address] [place] [address] for further management and evaluation. Patient with history of dysphagia. Her last swallow study was completed ~1 year ago with recommendation for moderately (honey) thick liquids via single cup sips and purees. Based on the objective data described below, the patient presents with swallow presentation consistent with most recent imaging, however limited by poor acceptance. She remains at elevated risk for aspiration given positioning, and decrease in mentation. Patient opened eyes to voice, was contracted to her L. Even with RN assistance to reposition patient unable to remain upright or hold her head up. With support to hold her head up, patient tolerated 2 bites of puree and one cup sip of moderately thick apple juice with no overt s/s of aspiration. Patient then firmly shutting her lips and shaking her head to any further PO trials. Given bedside presentation, recommend cautiously reinitiating baseline diet of puree/moderately thick. Recommend only feeding when patient is alert and actively accepting. Recommend trying to maintain upright position as much as possible.  Given new concerns for aspiration (unclear if aspiration event PTA was prandial or post-prandial during vomiting episode) low threshold to hold PO and repeat imaging. However patient would need to be able to actively accept multiple PO trials to participate in imaging. Apparently patient has been able to take meds and maintain nutrition/hydration PO PTA, however given poor acceptance, unsure if she will accept enough PO this admission. SLP will continue to follow for diet tolerance and further evaluation as indicated. Patient will benefit from skilled intervention to address the above impairments. Patients rehabilitation potential is considered to be Fair     PLAN :  Recommendations and Planned Interventions:  -- puree diet/moderately thick liquids  -- single cup sips of moderately thick liquids  -- meds crushed in applesauce only when alert and accepting  -- only feed when actively alert and accepting  -- Hold PO with any decrease in mentation  -- recommend maintaining upright position as much as possible with PO   -- SLP to continue to follow for diet tolerance and further assessment as indicated     Frequency/Duration: Patient will be followed by speech-language pathology 3 times a week to address goals. Discharge Recommendations: To Be Determined     SUBJECTIVE:   Patient with no verbalizations throughout entire session.      OBJECTIVE:     Past Medical History:   Diagnosis Date    Constipation     Hypotension     Psychomotor retardation     severe    Seizure disorder [facility])      Past Surgical History:   Procedure Laterality Date    COLONOSCOPY N/A 11/18/2019    COLONOSCOPY performed by Manuela Nicole MD at [facility]  11/18/2019          Prior Level of Function/Home Situation:   Home Situation  Support Systems: Parent(s), Adult Group Home  Patient Expects to be Discharged to[de-identified] Group home  Diet prior to admission: puree/moderately thick  Current Diet:  NPO   Cognitive and Communication Status:  Neurologic State: Eyes open to voice  Orientation Level: Unable to verbalize  Cognition: Decreased attention/concentration, Decreased command following  Perception:  (unable to assess)  Perseveration: No perseveration noted  Safety/Judgement: Decreased awareness of environment  Oral Assessment:  Oral Assessment  Labial: Other (comment) (unable to fully assess due to limited command following)  Dentition: Intact  Oral Hygiene: moist oral mucosa  Lingual: Other (comment) (unable to fully assess due to limited command following)  Velum: Unable to visualize  Mandible: Restricted  P.O. Trials:  Patient Position: semi-upright in bed - leaning to her L side - resisted straightening up fully  Vocal quality prior to P.O.:  (no vocalizations)  Consistency Presented: Puree; Honey thick liquid  How Presented: SLP-fed/presented;Cup/sip;Spoon     Bolus Acceptance: Impaired  Bolus Formation/Control: Impaired     Propulsion: Discoordination; Tongue pumping  Oral Residue: 10-50% of bolus  Initiation of Swallow: No impairment  Laryngeal Elevation: Functional  Aspiration Signs/Symptoms: None  Pharyngeal Phase Characteristics: Multiple swallows  Effective Modifications: None          Oral Phase Severity: Moderate-severe  Pharyngeal Phase Severity : Moderate-severe    NOMS:   The NOMS functional outcome measure was used to quantify this patient's level of swallowing impairment. Based on the NOMS, the patient was determined to be at level 4 for swallow function       NOMS Swallowing Levels:  Level 1 (CN): NPO  Level 2 (CM): NPO but takes consistency in therapy  Level 3 (CL): Takes less than 50% of nutrition p.o. and continues with nonoral feedings; and/or safe with mod cues; and/or max diet restriction  Level 4 (CK):  Safe swallow but needs mod cues; and/or mod diet restriction; and/or still requires some nonoral feeding/supplements  Level 5 (CJ): Safe swallow with min diet restriction; and/or needs min cues  Level 6 (CI): Independent with p.o.; rare cues; usually self cues; may need to avoid some foods or needs extra time  Level 7 ([address] [place] [address]): Independent for all p.o.  ANUPAMA. (2003). National Outcomes Measurement System (NOMS): Adult Speech-Language Pathology User's Guide. Pain:  Pain Scale 1: Adult Nonverbal Pain Scale          After treatment:   Patient left in no apparent distress in bed, Call bell within reach, and Nursing notified    COMMUNICATION/EDUCATION:     The patient's plan of care including recommendations, planned interventions, and recommended diet changes were discussed with: Registered nurse. Patient is unable to participate in goal setting and plan of care.     Thank you for this referral.  Cecelia Richey M.S. Daniel Hernandez Pathologist     Time Calculation: 15 mins

## 2022-12-07 NOTE — PROGRESS NOTES
[facility] Adult  Hospitalist Group                                                                                          Hospitalist Progress Note  Radha Hairston MD  Answering service: [phone] OR [phone] from in house phone        Date of Service:  2022  NAME:  Lian Mallory  :  1965  MRN:  [id number]      Admission Summary:   Lian Mallory is a 56 y.o. 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, hypothyroidism, chronic hypotension on midodrine, bedbound nonverbal came to the ER from a group home to [facility] with concerns for aspiration pneumonia, patient had an episode of vomiting yesterday, decreased mental status today, had a chest x-ray, concern for multifocal pneumonia, admitted to [facility], became hypotensive, put on Levophed, admitted by ICU team and then transferred to the hospitalist team for further management and evaluation, no further history could be obtained from the patient    Interval history / Subjective:   Patient seen and examined definitive is speech and swallow issue  Patient bedbound after COVID as per father  Patient is from a group home  Speech and swallow evaluation and changed medication as much possible to IV  Patient is not currently taking any oral pills does not talk     Assessment & Plan:     Aspiration pneumonia  -- Keep n.p.o. speech and swallow evaluation  --IV fluid with D5 to keep of blood sugar  --Antibiotics on cefepime follow-up cultures    Acute on chronic hypotension  --Currently blood pressure stable patient unable to take midodrine at the setting watch for now    Anemia  -- Anemia of chronic disease  -- Supplement with iron    Psychomotor retardation  Seizure disorder  --On valproate changed to IV    Hypothyroidism   --Continue levothyroxine    Replete mag - repeat lab    Code status: DNR  DVT prophylaxis: Lovenox    Care Plan discussed with: Patient/Family and Nurse  Anticipated Disposition: SNF/LTC  Anticipated Discharge: Greater than 48 hours     Hospital Problems  Date Reviewed: 11/29/2022            Codes Class Noted POA    Hospital-acquired pneumonia ICD-10-CM: J18.9, Y95  ICD-9-CM: 486  12/6/2022 Unknown             Review of Systems:   Review of systems not obtained due to patient factors. Vital Signs:    Last 24hrs VS reviewed since prior progress note. Most recent are:  Visit Vitals  BP (!) 127/91 (BP 1 Location: Left upper arm)   Pulse 96   Temp 98.1 °F (36.7 °C)   Resp 13   Wt 49.9 kg (110 lb 1.6 oz)   SpO2 100%   BMI 18.90 kg/m²         Intake/Output Summary (Last 24 hours) at 12/7/2022 1147  Last data filed at 12/7/2022 6658  Gross per 24 hour   Intake --   Output 1550 ml   Net -1550 ml        Physical Examination:     I had a face to face encounter with this patient and independently examined them on 12/7/2022 as outlined below:          General : alert x 0, awake, no acute distress  HEENT: PEERL, EOMI, moist mucus membrane, TM clear  Neck: supple, no JVD, no meningeal signs  Chest: Clear to auscultation bilaterally   CVS: S1 S2 heard, Capillary refill less than 2 seconds  Abd: soft/ Non tender, colostomy+  Ext: contracted  Neuro/Psych: pleasant mood and affect, contracted    Data Review:    I personally reviewed  Image and labs      Labs:     Recent Labs     12/07/22  0435 12/06/22  0950   WBC 8.6 8.6   HGB 9.3* 9.6*   HCT 27.9* 29.5*    191     Recent Labs     12/07/22  0435 12/06/22  0950 12/06/22  0559    144 141   K 4.3 4.1 4.0    113* 108   CO2 26 27 28   BUN 18 22* 26*   CREA 0.58 0.77 0.77   GLU 50* 70 114*   CA 8.9 8.4* 7.8*   MG 1.5* 1.5*  --    PHOS 3.3 2.6  --      Recent Labs     12/06/22  0559 12/05/22 2037   ALT 27 40   AP 88 128*   TBILI 0.1* 0.1*   TP 5.3* 7.7   ALB 1.9* 2.9*   GLOB 3.4 4.8*     No results for input(s): INR, PTP, APTT, INREXT in the last 72 hours.    No results for input(s): FE, TIBC, PSAT, FERR in the last 72 hours. Lab Results   Component Value Date/Time    Folate 6.5 08/29/2022 11:15 AM      Recent Labs     12/06/22  0632 12/05/22  2150   PH 7.39 7.42   PCO2 42 45   PO2 86 84     No results for input(s): CPK, CKNDX, TROIQ in the last 72 hours.     No lab exists for component: CPKMB  Lab Results   Component Value Date/Time    Cholesterol, total 208 (H) 02/14/2022 10:30 AM    HDL Cholesterol 91 02/14/2022 10:30 AM    LDL, calculated 96 02/14/2022 10:30 AM    Triglyceride 121 02/14/2022 10:30 AM     Lab Results   Component Value Date/Time    Glucose (POC) 194 (H) 12/07/2022 06:35 AM    Glucose (POC) 52 (LL) 12/07/2022 05:41 AM    Glucose (POC) 48 (LL) 12/07/2022 05:40 AM    Glucose (POC) 82 12/06/2022 06:14 AM     Lab Results   Component Value Date/Time    Color DARK YELLOW 12/05/2022 09:21 PM    Appearance CLEAR 12/05/2022 09:21 PM    Specific gravity 1.030 12/05/2022 09:21 PM    pH (UA) 5.5 12/05/2022 09:21 PM    Protein 30 (A) 12/05/2022 09:21 PM    Glucose Negative 12/05/2022 09:21 PM    Ketone 15 (A) 12/05/2022 09:21 PM    Bilirubin Negative 12/05/2022 09:21 PM    Urobilinogen 1.0 12/05/2022 09:21 PM    Nitrites Negative 12/05/2022 09:21 PM    Leukocyte Esterase MODERATE (A) 12/05/2022 09:21 PM    Epithelial cells FEW 12/05/2022 09:21 PM    Bacteria Negative 12/05/2022 09:21 PM    WBC 5-10 12/05/2022 09:21 PM    RBC 0-5 12/05/2022 09:21 PM         Medications Reviewed:     Current Facility-Administered Medications   Medication Dose Route Frequency    dextrose 10 % infusion 125-250 mL  125-250 mL IntraVENous PRN    dextrose 5% and 0.9% NaCl infusion  75 mL/hr IntraVENous CONTINUOUS    gabapentin (NEURONTIN) tablet 1,200 mg  1,200 mg Oral TID    midodrine (PROAMATINE) tablet 10 mg  10 mg Oral ACB&D    valproate (DEPACON) 250 mg in 0.9% sodium chloride 50 mL IVPB  250 mg IntraVENous Q8H    sodium chloride (NS) flush 5-40 mL  5-40 mL IntraVENous Q8H    sodium chloride (NS) flush 5-40 mL  5-40 mL IntraVENous PRN    acetaminophen (TYLENOL) tablet 650 mg  650 mg Oral Q6H PRN    Or    acetaminophen (TYLENOL) suppository 650 mg  650 mg Rectal Q6H PRN    polyethylene glycol (MIRALAX) packet 17 g  17 g Oral DAILY PRN    ondansetron (ZOFRAN ODT) tablet 4 mg  4 mg Oral Q8H PRN    Or    ondansetron (ZOFRAN) injection 4 mg  4 mg IntraVENous Q6H PRN    enoxaparin (LOVENOX) injection 40 mg  40 mg SubCUTAneous DAILY    bisacodyL (DULCOLAX) suppository 10 mg  10 mg Rectal DAILY PRN    guaiFENesin-dextromethorphan (ROBITUSSIN DM) 100-10 mg/5 mL syrup 5 mL  5 mL Oral Q6H PRN    diazePAM (VALIUM) tablet 5 mg  5 mg Oral Q6H PRN    hydrOXYzine HCL (ATARAX) tablet 10 mg  10 mg Oral Q6H PRN    levothyroxine (SYNTHROID) tablet 50 mcg  50 mcg Oral ACB    melatonin tablet 3 mg  3 mg Oral QHS PRN    polyethylene glycol (MIRALAX) packet 17 g  17 g Oral DAILY    cefepime (MAXIPIME) 2 g in 0.9% sodium chloride (MBP/ADV) 100 mL MBP  2 g IntraVENous Q8H    albuterol-ipratropium (DUO-NEB) 2.5 MG-0.5 MG/3 ML  3 mL Nebulization Q4H PRN     ______________________________________________________________________  EXPECTED LENGTH OF STAY: 3d 0h  ACTUAL LENGTH OF STAY:          1      Please note that this dictation was completed with [employer], the computer voice recognition software. Quite often unanticipated grammatical, syntax, homophones, and other interpretive errors are inadvertently transcribed by the computer software. Please disregard these errors. Please excuse any errors that have escaped final proofreading.                 Esdras Dial MD

## 2022-12-07 NOTE — PROGRESS NOTES
Patient had a hypoglycemia episode of 50 mg/dL, treated with D10 per hypo protocol. Repeat check 194. Provider informed, IVF changed to D5NS. PO Medication not given, unable to ascertain if patient is safe to swallow due to cognitive function. Would probably need a formal SLP evaluation. Bedside and Verbal shift change report given to ALEXANDRU Au (oncoming nurse) by Usha Bermudez RN  (offgoing nurse). Report included the following information SBAR, Kardex, Med Rec Status, and Cardiac Rhythm SR . Usha Bermudez RN      Problem: Falls - Risk of  Goal: *Absence of Falls  Description: Document Melissa Marking Fall Risk and appropriate interventions in the flowsheet. 12/7/2022 0832 by Rashida Sharma RN  Outcome: Progressing Towards Goal  Note: Fall Risk Interventions:                 Elimination Interventions: Toileting schedule/hourly rounds           12/6/2022 2358 by Rashida Sharma RN  Outcome: Progressing Towards Goal  Note: Fall Risk Interventions:                 Elimination Interventions: Toileting schedule/hourly rounds           12/6/2022 2358 by Rashida Sharma RN  Outcome: Progressing Towards Goal  Note: Fall Risk Interventions:                 Elimination Interventions:  Toileting schedule/hourly rounds              Problem: Patient Education: Go to Patient Education Activity  Goal: Patient/Family Education  12/7/2022 7495 by Rashida Sharma RN  Outcome: Progressing Towards Goal  12/6/2022 2358 by Rashida Sharma RN  Outcome: Progressing Towards Goal  12/6/2022 2358 by Rashida Sharma RN  Outcome: Progressing Towards Goal     Problem: Hypotension  Goal: *Blood pressure within specified parameters  12/7/2022 0832 by Rashida Sharma RN  Outcome: Progressing Towards Goal  12/6/2022 2358 by Rashida Sharma RN  Outcome: Progressing Towards Goal  12/6/2022 2358 by Rashida Sharma RN  Outcome: Progressing Towards Goal  Goal: *Fluid volume balance  12/7/2022 0832 by Rashida Sharma RN  Outcome: Progressing Towards Goal  12/6/2022 2358 by Felicitas Portillo RN  Outcome: Progressing Towards Goal  12/6/2022 2358 by Felicitas Portillo RN  Outcome: Progressing Towards Goal  Goal: *Labs within defined limits  12/7/2022 0832 by Felicitas Portillo RN  Outcome: Progressing Towards Goal  12/6/2022 2358 by Felicitas Portillo RN  Outcome: Progressing Towards Goal  12/6/2022 2358 by Felicitas Portillo RN  Outcome: Progressing Towards Goal

## 2022-12-07 NOTE — PROGRESS NOTES
Enoxaparin 40mg SQ daily was adjusted to 30mg SQ daily per [facility] P&T Committee Protocol with respect to renal function and patient weight. Pharmacy will continue to monitor patient daily and will make dosage adjustments based upon changing renal function.

## 2022-12-07 NOTE — PROGRESS NOTES
Addendum  12-8-22   See below   Can accept today at [address] Supervisor for Care Management   Review of the chart again due to request for transfer to [address] [place] [address] for continue care. Hospitalist and Team has approved -  We can accept patient tonight. If family in agreement for transfer     EMTLA continue medical care   Nursing Report [phone]   CM report to me - [phone]   Transportation as soon as possible. Give family # to [address] [facility]    [phone]   Nursing Supervisor - [phone]       Addendum 5:30PM   Received call back from [facility]- not ready to transition tonight. We will follow-up in am.      Addendum- 12-8-22  1:06PM    Hospitalist to Hospitalist report done-   Dr. Braxton Hernandez is the accepting Doctor    Delmi Jamison continue medical care   Nursing Report [phone]  room 224A    report to me - [phone]   Transportation as soon as possible.   Must arrive onsite  before 6PM.   Give family # to [address]- [phone]

## 2022-12-07 NOTE — PROGRESS NOTES
OH PLAN:  RUR17%  Disposition-TBD vs To return to Group Home  Transportation-by TBD vs BLS  F/U with PCP/Specialist    Patient is non verbal    Group Home: [facility] at [address] [facility], [address] [facility]. Tel #- [phone]    Contact Jhony Gabriel CHNMG-[phone]  -Renetta Oinowqxz-[phone]    Father: Petrona Chauhan [phone]    CM noted patient was supposed to be transferred to [facility] today. CM spoke with Dr. Sky Jeffers who said patient will need to be evaluated by SLP for aspiration and would need a couple of days of IV abx before discharging back to the [address] [place] [address]    Reason for Admission:  Aspiration pneumonia, hypotension, mental retardation                     RUR Score:    9%                 Plan for utilizing home health:     TBD     PCP: First and Last name:  Laurie Larsen MD     Name of Practice:    Are you a current patient: Yes/No: Yes   Approximate date of last visit:    Can you participate in a virtual visit with your PCP: No                    Current Advanced Directive/Advance Care Plan: DNR      Healthcare Decision Maker: Albinajazz Chauhan Nader-Father  Click here to complete [address] including selection of the Healthcare Decision Maker Relationship (ie \"Primary\")                           Transition of Care Plan: This CM and Dr. Sky Jeffers met with patient's father Phil Reyes to discuss discharge planning. The father said patient had just moved into a new Group home this past Thursday as the one she lived in for years had been bought by another company. Patient is mentally retarded, non verbal, needs assistance with all activities of daily living and w/c bound. The patient will return to the Group home upon discharge and they will provide transportation per Susa Severe, staff member who I spoke with. She also verified patient's new address and other information on the demographic information.     CM will continue to follow patient for transition of care. Pari Chavarria MSA, RN, CM  Care Management Interventions  PCP Verified by CM: Yes  Palliative Care Criteria Met (RRAT>21 & CHF Dx)?: No  Mode of Transport at Discharge:  Other (see comment)  Transition of Care Consult (CM Consult): Group Home  MyChart Signup: No  Discharge Durable Medical Equipment: No  Health Maintenance Reviewed: Yes  Physical Therapy Consult: No  Occupational Therapy Consult: No  Speech Therapy Consult: Yes  Support Systems: Parent(s), Adult Group Home  Confirm Follow Up Transport: Other (see comment)  Discharge Location  Patient Expects to be Discharged to[de-identified] Group home

## 2022-12-07 NOTE — WOUND CARE
WOCN Note:     New consult placed for assessment of heels and areas of red erythema POA. Chart reviewed. Assessed in room 402 with Rocael Cornell RN. Admitted DX:  Hospital-acquired pneumonia  Past Medical History:   Diagnosis Date    Constipation     Hypotension     Psychomotor retardation     severe    Seizure disorder [facility])      Assessment:   Patient is alert and requires assist  with repositioning. Bed: [facility] foam  Patient has a Becker. Patient repositioned on right side with pillow. Heels offloaded with pillows. 1. POA Left heel, Pressure Injury Stage 2/fluid filled bullae with a pink base: 3.2 x 5 x 0. cm  100% serous filled bullae; covered with foam dressing. 2. POA Right heel, Pressure Injury Stage 2/fluid filled bullae:  1 x 2 x 0 cm; 100% serous filled. Covered with foam dressing. 3.  POA Left foot, Pressure Injury Stage 1:  over field 2.2 x 6 x 0 cm; non blanching red erythema. Covered with foam dressing. 4.  POA Right foot, Pressure Injury stage 1:  2 x 1 x 0 cm; non blanching red erythema. 5.  POA Left inner knee, Pressure injury Stage 1:  1 x 0.5 x 0 cm; non blanching red erythema. 6.  POA Right knee, Pressure injury Stage 1:  2 areas in a field 7 x 3 x 0 cm; non blanching red erythema. 7. POA Left hip, Pressure Injury Stage 1:  2 areas 2 x 4 x 0 cm and 2 x 1 x 0 cm; non blanching red erythema. 8.  POA Sacrum, blanching pink erythema. Wound, Pressure Prevention & Skin Care Recommendations:    Minimize layers of linen/pads under patient to optimize support surface. 2.  Turn/reposition approximately every 2 hours and offload heels. Patient mobility team to assist with q2 turns. 3.  Manage moisture/ Keep skin folds clean and dry/minimize brief usage. 4.  [facility]. 5.  Red erythema to left hip, left foot, right knee and right great toe:  Apply Venelex TID and cover all areas with foam dressings.   6. Heels:  Protect heels with foam dressing; changed weekly and as needed. Discussed above plan with Rose Dill RN and Kim Edgar.     Transition of Care:   Plan to follow as needed while admitted to hospital.    KRISTAN NarvaezN RN [facility] Inpatient Wound Care  Available on [employer]  Office [phone]

## 2022-12-07 NOTE — PROGRESS NOTES
1830 TRANSFER - IN REPORT:    Verbal report received from [address] RN(name) on Chio Amor  being received from ED(unit) for routine progression of care      Report consisted of patients Situation, Background, Assessment and   Recommendations(SBAR). Information from the following report(s) SBAR was reviewed with the receiving nurse. Opportunity for questions and clarification was provided. Assessment completed upon patients arrival to unit and care assumed. [place] 6 obtained and pt placed on telemetry. 2000 Verbal bedside report given to Chi Kerns RN oncoming nurse by Sharlene Carrizales RN off-going nurse. Report included current pt status and condition, recent results, hx of present illness, heart rate and rhythm, and respiratory status.

## 2022-12-08 LAB
ANION GAP SERPL CALC-SCNC: 4 MMOL/L (ref 5–15)
BASOPHILS # BLD: 0 K/UL (ref 0–0.1)
BASOPHILS NFR BLD: 0 % (ref 0–1)
BUN SERPL-MCNC: 11 MG/DL (ref 6–20)
BUN/CREAT SERPL: 23 (ref 12–20)
CALCIUM SERPL-MCNC: 8.6 MG/DL (ref 8.5–10.1)
CHLORIDE SERPL-SCNC: 104 MMOL/L (ref 97–108)
CO2 SERPL-SCNC: 32 MMOL/L (ref 21–32)
CREAT SERPL-MCNC: 0.48 MG/DL (ref 0.55–1.02)
DIFFERENTIAL METHOD BLD: ABNORMAL
EOSINOPHIL # BLD: 0.1 K/UL (ref 0–0.4)
EOSINOPHIL NFR BLD: 1 % (ref 0–7)
ERYTHROCYTE [DISTWIDTH] IN BLOOD BY AUTOMATED COUNT: 15.7 % (ref 11.5–14.5)
GLUCOSE BLD STRIP.AUTO-MCNC: 103 MG/DL (ref 65–117)
GLUCOSE BLD STRIP.AUTO-MCNC: 118 MG/DL (ref 65–117)
GLUCOSE SERPL-MCNC: 85 MG/DL (ref 65–100)
HCT VFR BLD AUTO: 27.2 % (ref 35–47)
HGB BLD-MCNC: 9 G/DL (ref 11.5–16)
IMM GRANULOCYTES # BLD AUTO: 0 K/UL (ref 0–0.04)
IMM GRANULOCYTES NFR BLD AUTO: 0 % (ref 0–0.5)
LYMPHOCYTES # BLD: 2.1 K/UL (ref 0.8–3.5)
LYMPHOCYTES NFR BLD: 26 % (ref 12–49)
MAGNESIUM SERPL-MCNC: 2.1 MG/DL (ref 1.6–2.4)
MCH RBC QN AUTO: 32.1 PG (ref 26–34)
MCHC RBC AUTO-ENTMCNC: 33.1 G/DL (ref 30–36.5)
MCV RBC AUTO: 97.1 FL (ref 80–99)
MONOCYTES # BLD: 1 K/UL (ref 0–1)
MONOCYTES NFR BLD: 12 % (ref 5–13)
NEUTS SEG # BLD: 5 K/UL (ref 1.8–8)
NEUTS SEG NFR BLD: 61 % (ref 32–75)
NRBC # BLD: 0 K/UL (ref 0–0.01)
NRBC BLD-RTO: 0 PER 100 WBC
PHOSPHATE SERPL-MCNC: 3.4 MG/DL (ref 2.6–4.7)
PLATELET # BLD AUTO: 197 K/UL (ref 150–400)
PMV BLD AUTO: 12 FL (ref 8.9–12.9)
POTASSIUM SERPL-SCNC: 3.8 MMOL/L (ref 3.5–5.1)
RBC # BLD AUTO: 2.8 M/UL (ref 3.8–5.2)
SERVICE CMNT-IMP: ABNORMAL
SERVICE CMNT-IMP: NORMAL
SODIUM SERPL-SCNC: 140 MMOL/L (ref 136–145)
VALPROATE SERPL-MCNC: 41 UG/ML (ref 50–100)
WBC # BLD AUTO: 8.1 K/UL (ref 3.6–11)

## 2022-12-08 PROCEDURE — 74011250637 HC RX REV CODE- 250/637: Performed by: HOSPITALIST

## 2022-12-08 PROCEDURE — 74011000258 HC RX REV CODE- 258: Performed by: HOSPITALIST

## 2022-12-08 PROCEDURE — 65270000029 HC RM PRIVATE

## 2022-12-08 PROCEDURE — 74011000250 HC RX REV CODE- 250: Performed by: HOSPITALIST

## 2022-12-08 PROCEDURE — 92526 ORAL FUNCTION THERAPY: CPT | Performed by: SPEECH-LANGUAGE PATHOLOGIST

## 2022-12-08 PROCEDURE — 87040 BLOOD CULTURE FOR BACTERIA: CPT

## 2022-12-08 PROCEDURE — 85025 COMPLETE CBC W/AUTO DIFF WBC: CPT

## 2022-12-08 PROCEDURE — 36415 COLL VENOUS BLD VENIPUNCTURE: CPT

## 2022-12-08 PROCEDURE — 82962 GLUCOSE BLOOD TEST: CPT

## 2022-12-08 PROCEDURE — 84100 ASSAY OF PHOSPHORUS: CPT

## 2022-12-08 PROCEDURE — 80164 ASSAY DIPROPYLACETIC ACD TOT: CPT

## 2022-12-08 PROCEDURE — 74011000258 HC RX REV CODE- 258: Performed by: NURSE PRACTITIONER

## 2022-12-08 PROCEDURE — 83735 ASSAY OF MAGNESIUM: CPT

## 2022-12-08 PROCEDURE — 74011250636 HC RX REV CODE- 250/636: Performed by: STUDENT IN AN ORGANIZED HEALTH CARE EDUCATION/TRAINING PROGRAM

## 2022-12-08 PROCEDURE — 74011000250 HC RX REV CODE- 250: Performed by: STUDENT IN AN ORGANIZED HEALTH CARE EDUCATION/TRAINING PROGRAM

## 2022-12-08 PROCEDURE — 74011250636 HC RX REV CODE- 250/636: Performed by: NURSE PRACTITIONER

## 2022-12-08 PROCEDURE — 74011250637 HC RX REV CODE- 250/637: Performed by: NURSE PRACTITIONER

## 2022-12-08 PROCEDURE — 80048 BASIC METABOLIC PNL TOTAL CA: CPT

## 2022-12-08 PROCEDURE — 74011000250 HC RX REV CODE- 250: Performed by: NURSE PRACTITIONER

## 2022-12-08 RX ORDER — LANOLIN ALCOHOL/MO/W.PET/CERES
3 CREAM (GRAM) TOPICAL
Qty: 30 TABLET | Refills: 0 | Status: SHIPPED
Start: 2022-12-08

## 2022-12-08 RX ORDER — HYDROXYZINE HYDROCHLORIDE 10 MG/1
10 TABLET, FILM COATED ORAL
Qty: 60 TABLET | Refills: 0 | Status: SHIPPED
Start: 2022-12-08 | End: 2022-12-18

## 2022-12-08 RX ORDER — IPRATROPIUM BROMIDE AND ALBUTEROL SULFATE 2.5; .5 MG/3ML; MG/3ML
3 SOLUTION RESPIRATORY (INHALATION)
Qty: 30 EACH | Refills: 0 | Status: SHIPPED
Start: 2022-12-08 | End: 2022-12-21

## 2022-12-08 RX ADMIN — LEVOTHYROXINE SODIUM 50 MCG: 0.05 TABLET ORAL at 07:33

## 2022-12-08 RX ADMIN — DEXTROSE AND SODIUM CHLORIDE 75 ML/HR: 5; 900 INJECTION, SOLUTION INTRAVENOUS at 22:40

## 2022-12-08 RX ADMIN — SODIUM CHLORIDE, PRESERVATIVE FREE 10 ML: 5 INJECTION INTRAVENOUS at 14:00

## 2022-12-08 RX ADMIN — CEFTRIAXONE SODIUM 1 G: 1 INJECTION, POWDER, FOR SOLUTION INTRAMUSCULAR; INTRAVENOUS at 22:40

## 2022-12-08 RX ADMIN — MIDODRINE HYDROCHLORIDE 10 MG: 5 TABLET ORAL at 16:56

## 2022-12-08 RX ADMIN — ENOXAPARIN SODIUM 30 MG: 100 INJECTION SUBCUTANEOUS at 12:01

## 2022-12-08 RX ADMIN — GABAPENTIN 1200 MG: 600 TABLET, FILM COATED ORAL at 12:02

## 2022-12-08 RX ADMIN — SODIUM CHLORIDE 250 MG: 9 INJECTION, SOLUTION INTRAVENOUS at 07:32

## 2022-12-08 RX ADMIN — SODIUM CHLORIDE, PRESERVATIVE FREE 10 ML: 5 INJECTION INTRAVENOUS at 23:42

## 2022-12-08 RX ADMIN — SODIUM CHLORIDE 250 MG: 9 INJECTION, SOLUTION INTRAVENOUS at 16:56

## 2022-12-08 RX ADMIN — MIDODRINE HYDROCHLORIDE 10 MG: 5 TABLET ORAL at 07:33

## 2022-12-08 RX ADMIN — SODIUM CHLORIDE, PRESERVATIVE FREE 10 ML: 5 INJECTION INTRAVENOUS at 07:38

## 2022-12-08 RX ADMIN — Medication: at 16:56

## 2022-12-08 RX ADMIN — GABAPENTIN 1200 MG: 600 TABLET, FILM COATED ORAL at 16:56

## 2022-12-08 RX ADMIN — CEFEPIME 2 G: 2 INJECTION, POWDER, FOR SOLUTION INTRAVENOUS at 04:52

## 2022-12-08 RX ADMIN — POLYETHYLENE GLYCOL 3350 17 G: 17 POWDER, FOR SOLUTION ORAL at 12:02

## 2022-12-08 RX ADMIN — CEFEPIME 2 G: 2 INJECTION, POWDER, FOR SOLUTION INTRAVENOUS at 12:01

## 2022-12-08 RX ADMIN — Medication: at 07:37

## 2022-12-08 RX ADMIN — SODIUM CHLORIDE 250 MG: 9 INJECTION, SOLUTION INTRAVENOUS at 23:42

## 2022-12-08 NOTE — PROGRESS NOTES
Problem: Dysphagia (Adult)  Goal: *Acute Goals and Plan of Care (Insert Text)  Description: Speech Pathology Goals  Initiated 12/7/2022    1. Patient will tolerate baseline diet with no difficulties or adverse effects within 7 days. Outcome: Progressing Towards Goals    SPEECH LANGUAGE PATHOLOGY DYSPHAGIA TREATMENT  Patient: Vj Short (45 y.o. female)  Date: 12/8/2022  Diagnosis: Hospital-acquired pneumonia [J18.9, Y95] <principal problem not specified>      Precautions: Aspiration      ASSESSMENT:  Patient awake and upright in bed. Patient readily accepting PO trials this date. Patient with atypical oral swallow pattern with tongue thrusting and incoordinated oral manipulation and transit. Patient with anterior spillage of secretions and with with nearly all PO trials. Patient with inconsistent pharyngeal swallow delay. Patient demonstrated weak cough x 2 prior to PO trials, x 1 after pureed trial and x 2 after moderately thickened liquid trials. Suspect coughing unrelated to aspiration event. Patient remains at increased risk for aspiration given chronic dysphagia however given bedside presentation and tolerance of diet to date feel patient is safe to continue baseline diet of pureed diet, moderately thickened liquids. PLAN:  Recommendations and Planned Interventions:  Pureed diet  Moderately thickened liquids - may given single cup sips of via teaspoon  Small bites/sips  Medication crushed in puree  Patient continues to benefit from skilled intervention to address the above impairments. Continue treatment per established plan of care. Discharge Recommendations: To Be Determined     SUBJECTIVE:   No verbalizations, patient occasionally vocalizing. Patient demonstrated weak cough x 2 prior to PO trials. RN at bedside for medication administration.     OBJECTIVE:   Cognitive and Communication Status:  Neurologic State: Alert  Orientation Level: Unable to verbalize  Cognition: No command following  Perception:  (unable to assess)  Perseveration: No perseveration noted  Safety/Judgement: Decreased awareness of environment  Dysphagia Treatment:  Oral Assessment:     P.O. Trials:  Patient Position: Upright in bed, leaning to right  Vocal quality prior to P.O.: No impairment  Consistency Presented: Honey thick liquid;Puree  How Presented: SLP-fed/presented;Cup/sip;Spoon     Bolus Acceptance: Impaired  Bolus Formation/Control: Impaired  Type of Impairment: Delayed; Incomplete;Poor; Anterior;Spillage  Propulsion: Delayed (# of seconds); Discoordination; Tongue pumping  Oral Residue: None  Initiation of Swallow: Delayed (# of seconds)     Aspiration Signs/Symptoms: Delayed cough/throat clear                        After treatment:   Patient left in no apparent distress in bed, Call bell within reach, and Nursing notified    COMMUNICATION/EDUCATION:   Patient was educated regarding role of SLP. Patient without response. The patient's plan of care including recommendations, planned interventions, and recommended diet changes were discussed with: Registered nurse.      NICHOLAS Bean  Time Calculation: 18 mins

## 2022-12-08 NOTE — PROGRESS NOTES
[facility] Adult  Hospitalist Group                                                                                          Hospitalist Progress Note  Deann Daniels MD  Answering service: [phone] OR [phone] from in house phone        Date of Service:  2022  NAME:  Ishan Tong  :  1965  MRN:  [id number]      Admission Summary:   Ishan Tong is a 56 y.o. 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, hypothyroidism, chronic hypotension on midodrine, bedbound nonverbal came to the ER from a group home to [facility] with concerns for aspiration pneumonia, patient had an episode of vomiting yesterday, decreased mental status today, had a chest x-ray, concern for multifocal pneumonia, admitted to [facility], became hypotensive, put on Levophed, admitted by ICU team and then transferred to the hospitalist team for further management and evaluation, no further history could be obtained from the patient    Interval history / Subjective:   Patient accepted for transfer to [address] [place] [address] and discharge placed. However, patient's family is concerned about transfer and after prolonged discussions with family, nursing, CM, and nursing supervisor, decided patient will stay here. Downgrade to medical floor. Assessment & Plan:     Aspiration pneumonia  --Dysphagia diet per speech, eating well   --IV fluid with D5 to keep of blood sugar, plan to DC tomorrow   --Antibiotics on cefepime, deescalate to rocephin    Positive blood culture without bacteremia   - 1/2 bottles positive for Staph, likely contaminant given no leukocytosis and Biofire positive for Staph but not MRSA or MSSA, likely staph epi   - No repeat cultures  - Repeat blood cultures ordered    Pressure wounds  Wound, Pressure Prevention & Skin Care Recommendations:    Minimize layers of linen/pads under patient to optimize support surface.     2. Turn/reposition approximately every 2 hours and offload heels. Patient mobility team to assist with q2 turns. 3.  Manage moisture/ Keep skin folds clean and dry/minimize brief usage. 4.  Specialty bed: [facility] air mattress ordered from [facility]. 5.  Red erythema to left hip, left foot, right knee and right great toe:  Apply Venelex TID and cover all areas with foam dressings. 6.  Heels:  Protect heels with foam dressing; changed weekly and as needed. Acute on chronic hypotension, resolved  --Currently blood pressure stable patient unable to take midodrine at the setting watch for now    Anemia  -- Anemia of chronic disease  -- Supplement with iron    Psychomotor retardation  Seizure disorder  --On valproate changed to IV    Hypothyroidism   --Continue levothyroxine    Replete mag - repeat lab    Code status: DNR  DVT prophylaxis: Lovenox    Care Plan discussed with: Patient/Family and Nurse  Anticipated Disposition: SNF/LTC  Anticipated Discharge: Greater than 48 hours     Hospital Problems  Date Reviewed: 11/29/2022            Codes Class Noted POA    Hospital-acquired pneumonia ICD-10-CM: J18.9, Y95  ICD-9-CM: 486  12/6/2022 Unknown             Review of Systems:   Review of systems not obtained due to patient factors. Vital Signs:    Last 24hrs VS reviewed since prior progress note.  Most recent are:  Visit Vitals  /73 (BP 1 Location: Right upper arm)   Pulse 94   Temp 97.5 °F (36.4 °C)   Resp 11   Wt 49.9 kg (110 lb 1.6 oz)   SpO2 100%   BMI 18.90 kg/m²       No intake or output data in the 24 hours ending 12/08/22 9164       Physical Examination:     I had a face to face encounter with this patient and independently examined them on 12/8/2022 as outlined below:          General : alert x 0, awake, no acute distress  HEENT: PEERL, EOMI, moist mucus membrane  Neck: supple, no JVD, no meningeal signs  Chest: Clear to auscultation bilaterally   CVS: S1 S2 heard, Capillary refill less than 2 seconds  Abd: soft/ Non tender, colostomy+  Ext: contracted  Neuro/Psych: pleasant mood and affect, contracted    Data Review:    I personally reviewed  Image and labs      Labs:     Recent Labs     12/08/22 0449 12/07/22 0435   WBC 8.1 8.6   HGB 9.0* 9.3*   HCT 27.2* 27.9*    171     Recent Labs     12/08/22  0449 12/07/22  0435 12/06/22  0950    139 144   K 3.8 4.3 4.1    105 113*   CO2 32 26 27   BUN 11 18 22*   CREA 0.48* 0.58 0.77   GLU 85 50* 70   CA 8.6 8.9 8.4*   MG 2.1 1.5* 1.5*   PHOS 3.4 3.3 2.6     Recent Labs     12/06/22  0559 12/05/22 2037   ALT 27 40   AP 88 128*   TBILI 0.1* 0.1*   TP 5.3* 7.7   ALB 1.9* 2.9*   GLOB 3.4 4.8*     No results for input(s): INR, PTP, APTT, INREXT, INREXT in the last 72 hours. No results for input(s): FE, TIBC, PSAT, FERR in the last 72 hours. Lab Results   Component Value Date/Time    Folate 6.5 08/29/2022 11:15 AM      Recent Labs     12/06/22  0632 12/05/22  2150   PH 7.39 7.42   PCO2 42 45   PO2 86 84     No results for input(s): CPK, CKNDX, TROIQ in the last 72 hours.     No lab exists for component: CPKMB  Lab Results   Component Value Date/Time    Cholesterol, total 208 (H) 02/14/2022 10:30 AM    HDL Cholesterol 91 02/14/2022 10:30 AM    LDL, calculated 96 02/14/2022 10:30 AM    Triglyceride 121 02/14/2022 10:30 AM     Lab Results   Component Value Date/Time    Glucose (POC) 118 (H) 12/08/2022 12:14 PM    Glucose (POC) 111 12/07/2022 05:04 PM    Glucose (POC) 109 12/07/2022 11:58 AM    Glucose (POC) 194 (H) 12/07/2022 06:35 AM    Glucose (POC) 52 (LL) 12/07/2022 05:41 AM     Lab Results   Component Value Date/Time    Color DARK YELLOW 12/05/2022 09:21 PM    Appearance CLEAR 12/05/2022 09:21 PM    Specific gravity 1.030 12/05/2022 09:21 PM    pH (UA) 5.5 12/05/2022 09:21 PM    Protein 30 (A) 12/05/2022 09:21 PM    Glucose Negative 12/05/2022 09:21 PM    Ketone 15 (A) 12/05/2022 09:21 PM    Bilirubin Negative 12/05/2022 09:21 PM Urobilinogen 1.0 12/05/2022 09:21 PM    Nitrites Negative 12/05/2022 09:21 PM    Leukocyte Esterase MODERATE (A) 12/05/2022 09:21 PM    Epithelial cells FEW 12/05/2022 09:21 PM    Bacteria Negative 12/05/2022 09:21 PM    WBC 5-10 12/05/2022 09:21 PM    RBC 0-5 12/05/2022 09:21 PM         Medications Reviewed:     Current Facility-Administered Medications   Medication Dose Route Frequency    dextrose 10 % infusion 125-250 mL  125-250 mL IntraVENous PRN    dextrose 5% and 0.9% NaCl infusion  75 mL/hr IntraVENous CONTINUOUS    gabapentin (NEURONTIN) tablet 1,200 mg  1,200 mg Oral TID    midodrine (PROAMATINE) tablet 10 mg  10 mg Oral ACB&D    valproate (DEPACON) 250 mg in 0.9% sodium chloride 50 mL IVPB  250 mg IntraVENous Q8H    enoxaparin (LOVENOX) injection 30 mg  30 mg SubCUTAneous DAILY    balsam peru-castor oiL (VENELEX) ointment   Topical Q8H    sodium chloride (NS) flush 5-40 mL  5-40 mL IntraVENous Q8H    sodium chloride (NS) flush 5-40 mL  5-40 mL IntraVENous PRN    acetaminophen (TYLENOL) tablet 650 mg  650 mg Oral Q6H PRN    Or    acetaminophen (TYLENOL) suppository 650 mg  650 mg Rectal Q6H PRN    polyethylene glycol (MIRALAX) packet 17 g  17 g Oral DAILY PRN    ondansetron (ZOFRAN ODT) tablet 4 mg  4 mg Oral Q8H PRN    Or    ondansetron (ZOFRAN) injection 4 mg  4 mg IntraVENous Q6H PRN    bisacodyL (DULCOLAX) suppository 10 mg  10 mg Rectal DAILY PRN    guaiFENesin-dextromethorphan (ROBITUSSIN DM) 100-10 mg/5 mL syrup 5 mL  5 mL Oral Q6H PRN    diazePAM (VALIUM) tablet 5 mg  5 mg Oral Q6H PRN    hydrOXYzine HCL (ATARAX) tablet 10 mg  10 mg Oral Q6H PRN    levothyroxine (SYNTHROID) tablet 50 mcg  50 mcg Oral ACB    melatonin tablet 3 mg  3 mg Oral QHS PRN    polyethylene glycol (MIRALAX) packet 17 g  17 g Oral DAILY    cefepime (MAXIPIME) 2 g in 0.9% sodium chloride (MBP/ADV) 100 mL MBP  2 g IntraVENous Q8H    albuterol-ipratropium (DUO-NEB) 2.5 MG-0.5 MG/3 ML  3 mL Nebulization Q4H PRN ______________________________________________________________________  EXPECTED LENGTH OF STAY: 3d 0h  ACTUAL LENGTH OF STAY:          2      Please note that this dictation was completed with [employer], the computer voice recognition software. Quite often unanticipated grammatical, syntax, homophones, and other interpretive errors are inadvertently transcribed by the computer software. Please disregard these errors. Please excuse any errors that have escaped final proofreading.                 Heidy Ly MD

## 2022-12-08 NOTE — PROGRESS NOTES
[place] PLAN:    RUR18%    Disposition-To return to [facility] at [address] [facility], [address] [place] [address]. Tel #- [phone]    Transportation-by the Group Home    Contact Porsha Trivedi OSEHZ-[phone]  -Renetta YIDJDUVM-[phone]    Ms. CHAMBERSAVOB-XZPNR-[phone]     Father: Kari Lr [phone]. Transfer to [facility] cancelled. Andra Mayfield notified    CM called [facility], left a VM message that patient will be discharged tomorrow. Patient to [facility] today before 6 pm    [id number]-[id number] at 3 :00 pm    Nurse to call report to [phone] room 224 A    CM to call report to [phone]    EMTALA-Parts of form completed, bedside nurse to complete the rest    CM met with patient and father Den Nelson and informed him of transfer to [facility]. CM answered father's questions and he agreed with the transfer. CM gave the father the Hospital room # (224 A) and unit telephone number at [phone]    2:10 PM. Patient's father informed this CM that he did not want his daughter to be transferred. CM notified Dr. Shalonda Mancera of same. 2:20 PM. Dr. Shalonda Mancera is in to meet with patient's father. 2:50 pm. CM informed by the bedside nurse that Dr. Shalonda Mancera informed her the supervisor will come to meet with the father. 3:50 pm. CM informed by Dr. Shalonda Mancera that patient would not be transferred but will be moved to medical unit.    4;05. CM called [facility] and spoke with Collette Spangle to cancel transportation scheduled for 4;40 pm.    Disposition-[facility] vs To return to [address] [place] [address]. Awaiting order    Transportation-by Group Home will provide transportation vs BLS to [facility]    F/U with PCP/Specialist     Patient is non verbal     Group Home: [facility] at [address] [facility], [facility]. Tel #- [phone]     Contact Porsha Trivedi ZZDLM-[phone]  -Renetta JENKINSYMUDYTJC-[phone]     Father: Kari BECKER-[phone].     CM noted documentation by [facility] Supervisor for Care Management regarding transfer when patient medically stable.

## 2022-12-08 NOTE — DISCHARGE SUMMARY
Discharge Summary       PATIENT ID: Ashley Dennison  MRN: [id number]   YOB: 1965    DATE OF ADMISSION: 12/6/2022  8:27 AM    DATE OF DISCHARGE: 12/08/22    PRIMARY CARE PROVIDER: Kaci Wilhelm MD     ATTENDING PHYSICIAN: Ivett Alcaraz MD   DISCHARGING PROVIDER: Ivett Alcaraz MD    To contact this individual call [phone] and ask the  to page. If unavailable ask to be transferred the Adult Hospitalist Department. CONSULTATIONS: None    PROCEDURES/SURGERIES: * No surgery found *    ADMITTING DIAGNOSES & HOSPITAL COURSE:   Aspiration pneumonia  Acute on chronic hypotension  Anemia  Psychomotor retardation  Seizure disorder    Ashley Dennison is as 56 y.o. female who presented with concerns of low blood pressures in setting of aspiration pneumonia. Patient was initially admitted to [address] [place] [address] and then transferred to [facility] given blood pressure concerns. She was initially admitted to the ICU at [facility] on levophed, but was then weaned to midodrine and transferred to the floor. Patient did well on the floor, without any oxygen requirement and stable hemoglobin. On 12/08/22, patient was suitable for discharge back to [address] [place] [address] while awaiting blood cultures to result and for further management of her pneumonia. She was evaluated by Speech therapy here, diet recommendations are listed below. DISCHARGE DIAGNOSES / PLAN:       Aspiration pneumonia  Positive blood culture without bacteremia: repeat today, follow.  Appears to be contaminant  Acute on chronic hypotension, resolved  Anemia  Psychomotor retardation  Seizure disorder  Hypothyroidism  Electrolyte abnormalities       PENDING TEST RESULTS:   At the time of discharge the following test results are still pending: blood cultures     FOLLOW UP APPOINTMENTS:    Follow-up Information       Follow up With Specialties Details Why Contact Joanie Birch MD Family Medicine, Emergency Medicine   [address] [place] [address] [phone] ADDITIONAL CARE RECOMMENDATIONS:   Diet:   -- puree diet/moderately thick liquids  -- single cup sips of moderately thick liquids  -- meds crushed in applesauce only when alert and accepting  -- only feed when actively alert and accepting  -- Hold PO with any decrease in mentation  -- recommend maintaining upright position as much as possible with PO   -- SLP to continue to follow for diet tolerance and further assessment as indicated        DIET: as above    ACTIVITY: Mobility team eval    WOUND CARE:  Wound, Pressure Prevention & Skin Care Recommendations:    Minimize layers of linen/pads under patient to optimize support surface. 2.  Turn/reposition approximately every 2 hours and offload heels. Patient mobility team to assist with q2 turns. 3.  Manage moisture/ Keep skin folds clean and dry/minimize brief usage. 4.  Specialty bed: Prius air mattress ordered from [facility]. 5.  Red erythema to left hip, left foot, right knee and right great toe:  Apply Venelex TID and cover all areas with foam dressings. 6.  Heels:  Protect heels with foam dressing; changed weekly and as needed. EQUIPMENT needed: n/a      DISCHARGE MEDICATIONS:  Current Discharge Medication List        START taking these medications    Details   albuterol-ipratropium (DUO-NEB) 2.5 mg-0.5 mg/3 ml nebu 3 mL by Nebulization route every four (4) hours as needed for Respiratory Distress or Wheezing. Qty: 30 Each, Refills: 0  Start date: 12/8/2022      cefepime 2 gram 2 g IVPB 2 g by IntraVENous route every eight (8) hours. Refills: 0  Start date: 12/8/2022           CONTINUE these medications which have CHANGED    Details   hydrOXYzine HCL (ATARAX) 10 mg tablet Take 1 Tablet by mouth every six (6) hours as needed for Itching for up to 10 days. Qty: 60 Tablet, Refills: 0  Start date: 12/8/2022, End date: 12/18/2022      melatonin 3 mg tablet Take 1 Tablet by mouth nightly as needed for Insomnia.   Qty: 30 Tablet, Refills: 0  Start date: 12/8/2022           CONTINUE these medications which have NOT CHANGED    Details   divalproex DR (DEPAKOTE) 250 mg tablet Take 250 mg by mouth three (3) times daily. scopolamine (TRANSDERM-SCOP) 1 mg over 3 days pt3d 1 Patch by TransDERmal route every seventy-two (72) hours. Qty: 24 Patch, Refills: 5      levothyroxine (SYNTHROID) 50 mcg tablet Take 1 Tablet by mouth Daily (before breakfast). Qty: 90 Tablet, Refills: 4      midodrine (PROAMATINE) 10 mg tablet TAKE 1 TABLET BY MOUTH IN THE MORNING AND IN THE EVENING WITH BREAKFAST & DINNER  Qty: 60 Tablet, Refills: 11      acetaminophen (TylenoL) 325 mg tablet Take 1 Tab by mouth every six (6) hours as needed for Pain. Qty: 100 Tab, Refills: 11      dextromethorphan-guaiFENesin (Robitussin Cough-Chest Narendra DM) 5-100 mg/5 mL liqd Take 5 mL by mouth every four to six (4-6) hours as needed for Cough or Congestion. Qty: 1 Bottle, Refills: 11      multivitamin (ONE A DAY) tablet Take 1 Tab by mouth daily. Qty: 90 Tab, Refills: 3      gabapentin (NEURONTIN) 600 mg tablet Take 1,200 mg by mouth three (3) times daily. polyethylene glycol (MIRALAX) 17 gram packet Take 17 g by mouth daily. docusate sodium (COLACE) 100 mg capsule Take 100 mg by mouth two (2) times daily as needed for Constipation. STOP taking these medications       bisacodyL (DULCOLAX) 10 mg supp Comments:   Reason for Stopping:         diazePAM (VALIUM) 5 mg tablet Comments:   Reason for Stopping:                 NOTIFY YOUR PHYSICIAN FOR ANY OF THE FOLLOWING:   Fever over 101 degrees for 24 hours. Chest pain, shortness of breath, fever, chills, nausea, vomiting, diarrhea, change in mentation, falling, weakness, bleeding. Severe pain or pain not relieved by medications. Or, any other signs or symptoms that you may have questions about.     DISPOSITION:    Home With:   OT  PT  HH  RN       Long term SNF/Inpatient Rehab    Independent/assisted living    Hospice   x Other: [facility]       PATIENT CONDITION AT DISCHARGE:     Functional status   x Poor     Deconditioned     Independent      Catheters/lines (plus indication)    Becker     PICC     PEG    x None      Code status     Full code    x DNR      PHYSICAL EXAMINATION AT DISCHARGE:  Visit Vitals  /73 (BP 1 Location: Right upper arm)   Pulse 87   Temp 97.5 °F (36.4 °C)   Resp 11   Wt 49.9 kg (110 lb 1.6 oz)   SpO2 100%   BMI 18.90 kg/m²      General : alert x 0, awake, no acute distress  HEENT: PEERL, EOMI, moist mucus membrane, TM clear  Neck: supple, no JVD, no meningeal signs  Chest: Clear to auscultation bilaterally   CVS: S1 S2 heard, Capillary refill less than 2 seconds  Abd: soft/ Non tender, colostomy+  Ext: contracted  Neuro/Psych: pleasant mood and affect, contracted      CHRONIC MEDICAL DIAGNOSES:  Problem List as of 12/8/2022 Date Reviewed: 11/29/2022            Codes Class Noted - Resolved    Hospital-acquired pneumonia ICD-10-CM: J18.9, Y95  ICD-9-CM: 486  12/6/2022 - Present        Pneumonia ICD-10-CM: J18.9  ICD-9-CM: 486  12/5/2022 - Present        Seizure disorder ([facility] [address] ICD-10-CM: G40.909  ICD-9-CM: 345.90  Unknown - Present        Hypotension ICD-10-CM: I95.9  ICD-9-CM: 458.9  Unknown - Present        Constipation ICD-10-CM: K59.00  ICD-9-CM: 564.00  Unknown - Present        Psychomotor retardation ICD-10-CM: R41.843  ICD-9-CM: 799.54  Unknown - Present           Greater than 30 minutes were spent with the patient on counseling and coordination of care    Signed:   Adry Quiñones MD  12/8/2022  1:27 PM

## 2022-12-08 NOTE — PROGRESS NOTES
No issues overnight. Bedside and Verbal shift change report given to ALEXANDRU Alcazar (oncoming nurse) by Shirlene Mathew RN  (offgoing nurse). Report included the following information SBAR, Kardex, Intake/Output, MAR, Med Rec Status, and Cardiac Rhythm SR . Shirlene Mathew RN      Problem: Falls - Risk of  Goal: *Absence of Falls  Description: Document [place] Fail Fall Risk and appropriate interventions in the flowsheet.   Outcome: Progressing Towards Goal  Note: Fall Risk Interventions:       Mentation Interventions: Bed/chair exit alarm    Medication Interventions: Bed/chair exit alarm    Elimination Interventions: Bed/chair exit alarm              Problem: Patient Education: Go to Patient Education Activity  Goal: Patient/Family Education  Outcome: Progressing Towards Goal     Problem: Hypotension  Goal: *Blood pressure within specified parameters  Outcome: Progressing Towards Goal  Goal: *Fluid volume balance  Outcome: Progressing Towards Goal  Goal: *Labs within defined limits  Outcome: Progressing Towards Goal     Problem: Patient Education: Go to Patient Education Activity  Goal: Patient/Family Education  Outcome: Progressing Towards Goal

## 2022-12-09 LAB
ANION GAP SERPL CALC-SCNC: 4 MMOL/L (ref 5–15)
BASOPHILS # BLD: 0 K/UL (ref 0–0.1)
BASOPHILS NFR BLD: 0 % (ref 0–1)
BUN SERPL-MCNC: 11 MG/DL (ref 6–20)
BUN/CREAT SERPL: 22 (ref 12–20)
CALCIUM SERPL-MCNC: 8.1 MG/DL (ref 8.5–10.1)
CHLORIDE SERPL-SCNC: 110 MMOL/L (ref 97–108)
CO2 SERPL-SCNC: 28 MMOL/L (ref 21–32)
CREAT SERPL-MCNC: 0.5 MG/DL (ref 0.55–1.02)
DIFFERENTIAL METHOD BLD: ABNORMAL
EOSINOPHIL # BLD: 0.1 K/UL (ref 0–0.4)
EOSINOPHIL NFR BLD: 1 % (ref 0–7)
ERYTHROCYTE [DISTWIDTH] IN BLOOD BY AUTOMATED COUNT: 16 % (ref 11.5–14.5)
GLUCOSE BLD STRIP.AUTO-MCNC: 100 MG/DL (ref 65–117)
GLUCOSE BLD STRIP.AUTO-MCNC: 128 MG/DL (ref 65–117)
GLUCOSE BLD STRIP.AUTO-MCNC: 80 MG/DL (ref 65–117)
GLUCOSE BLD STRIP.AUTO-MCNC: 99 MG/DL (ref 65–117)
GLUCOSE SERPL-MCNC: 108 MG/DL (ref 65–100)
HCT VFR BLD AUTO: 26.3 % (ref 35–47)
HGB BLD-MCNC: 8.7 G/DL (ref 11.5–16)
IMM GRANULOCYTES # BLD AUTO: 0.1 K/UL (ref 0–0.04)
IMM GRANULOCYTES NFR BLD AUTO: 1 % (ref 0–0.5)
LYMPHOCYTES # BLD: 1.8 K/UL (ref 0.8–3.5)
LYMPHOCYTES NFR BLD: 26 % (ref 12–49)
MAGNESIUM SERPL-MCNC: 1.9 MG/DL (ref 1.6–2.4)
MCH RBC QN AUTO: 32.7 PG (ref 26–34)
MCHC RBC AUTO-ENTMCNC: 33.1 G/DL (ref 30–36.5)
MCV RBC AUTO: 98.9 FL (ref 80–99)
MONOCYTES # BLD: 1.1 K/UL (ref 0–1)
MONOCYTES NFR BLD: 16 % (ref 5–13)
NEUTS SEG # BLD: 3.9 K/UL (ref 1.8–8)
NEUTS SEG NFR BLD: 56 % (ref 32–75)
NRBC # BLD: 0 K/UL (ref 0–0.01)
NRBC BLD-RTO: 0 PER 100 WBC
PHOSPHATE SERPL-MCNC: 3.9 MG/DL (ref 2.6–4.7)
PLATELET # BLD AUTO: 197 K/UL (ref 150–400)
PMV BLD AUTO: 11.6 FL (ref 8.9–12.9)
POTASSIUM SERPL-SCNC: 3.9 MMOL/L (ref 3.5–5.1)
RBC # BLD AUTO: 2.66 M/UL (ref 3.8–5.2)
SERVICE CMNT-IMP: ABNORMAL
SERVICE CMNT-IMP: NORMAL
SODIUM SERPL-SCNC: 142 MMOL/L (ref 136–145)
WBC # BLD AUTO: 6.9 K/UL (ref 3.6–11)

## 2022-12-09 PROCEDURE — 92526 ORAL FUNCTION THERAPY: CPT | Performed by: SPEECH-LANGUAGE PATHOLOGIST

## 2022-12-09 PROCEDURE — 82962 GLUCOSE BLOOD TEST: CPT

## 2022-12-09 PROCEDURE — 74011250637 HC RX REV CODE- 250/637: Performed by: NURSE PRACTITIONER

## 2022-12-09 PROCEDURE — 74011250636 HC RX REV CODE- 250/636: Performed by: STUDENT IN AN ORGANIZED HEALTH CARE EDUCATION/TRAINING PROGRAM

## 2022-12-09 PROCEDURE — 74011000250 HC RX REV CODE- 250: Performed by: NURSE PRACTITIONER

## 2022-12-09 PROCEDURE — 74011250637 HC RX REV CODE- 250/637: Performed by: STUDENT IN AN ORGANIZED HEALTH CARE EDUCATION/TRAINING PROGRAM

## 2022-12-09 PROCEDURE — 85025 COMPLETE CBC W/AUTO DIFF WBC: CPT

## 2022-12-09 PROCEDURE — 74011000250 HC RX REV CODE- 250: Performed by: HOSPITALIST

## 2022-12-09 PROCEDURE — 74011250637 HC RX REV CODE- 250/637: Performed by: HOSPITALIST

## 2022-12-09 PROCEDURE — 97161 PT EVAL LOW COMPLEX 20 MIN: CPT

## 2022-12-09 PROCEDURE — 74011000258 HC RX REV CODE- 258: Performed by: HOSPITALIST

## 2022-12-09 PROCEDURE — 74011000258 HC RX REV CODE- 258: Performed by: NURSE PRACTITIONER

## 2022-12-09 PROCEDURE — 97530 THERAPEUTIC ACTIVITIES: CPT

## 2022-12-09 PROCEDURE — 83735 ASSAY OF MAGNESIUM: CPT

## 2022-12-09 PROCEDURE — 74011250636 HC RX REV CODE- 250/636: Performed by: NURSE PRACTITIONER

## 2022-12-09 PROCEDURE — 84100 ASSAY OF PHOSPHORUS: CPT

## 2022-12-09 PROCEDURE — 65270000029 HC RM PRIVATE

## 2022-12-09 PROCEDURE — 80048 BASIC METABOLIC PNL TOTAL CA: CPT

## 2022-12-09 PROCEDURE — 74011000250 HC RX REV CODE- 250: Performed by: STUDENT IN AN ORGANIZED HEALTH CARE EDUCATION/TRAINING PROGRAM

## 2022-12-09 RX ORDER — DIVALPROEX SODIUM 250 MG/1
250 TABLET, DELAYED RELEASE ORAL 3 TIMES DAILY
Status: DISCONTINUED | OUTPATIENT
Start: 2022-12-09 | End: 2022-12-22 | Stop reason: HOSPADM

## 2022-12-09 RX ADMIN — CEFTRIAXONE SODIUM 1 G: 1 INJECTION, POWDER, FOR SOLUTION INTRAMUSCULAR; INTRAVENOUS at 19:34

## 2022-12-09 RX ADMIN — MIDODRINE HYDROCHLORIDE 10 MG: 5 TABLET ORAL at 18:13

## 2022-12-09 RX ADMIN — GABAPENTIN 1200 MG: 600 TABLET, FILM COATED ORAL at 10:32

## 2022-12-09 RX ADMIN — SODIUM CHLORIDE, PRESERVATIVE FREE 10 ML: 5 INJECTION INTRAVENOUS at 15:12

## 2022-12-09 RX ADMIN — Medication: at 15:12

## 2022-12-09 RX ADMIN — GABAPENTIN 1200 MG: 600 TABLET, FILM COATED ORAL at 18:13

## 2022-12-09 RX ADMIN — LEVOTHYROXINE SODIUM 50 MCG: 0.05 TABLET ORAL at 06:34

## 2022-12-09 RX ADMIN — DIVALPROEX SODIUM 250 MG: 250 TABLET, DELAYED RELEASE ORAL at 23:07

## 2022-12-09 RX ADMIN — Medication: at 06:27

## 2022-12-09 RX ADMIN — DEXTROSE AND SODIUM CHLORIDE 75 ML/HR: 5; 900 INJECTION, SOLUTION INTRAVENOUS at 10:39

## 2022-12-09 RX ADMIN — Medication: at 23:07

## 2022-12-09 RX ADMIN — SODIUM CHLORIDE 250 MG: 9 INJECTION, SOLUTION INTRAVENOUS at 15:12

## 2022-12-09 RX ADMIN — MIDODRINE HYDROCHLORIDE 10 MG: 5 TABLET ORAL at 06:34

## 2022-12-09 RX ADMIN — GABAPENTIN 1200 MG: 600 TABLET, FILM COATED ORAL at 23:07

## 2022-12-09 RX ADMIN — POLYETHYLENE GLYCOL 3350 17 G: 17 POWDER, FOR SOLUTION ORAL at 10:31

## 2022-12-09 RX ADMIN — ENOXAPARIN SODIUM 30 MG: 100 INJECTION SUBCUTANEOUS at 10:31

## 2022-12-09 RX ADMIN — SODIUM CHLORIDE 250 MG: 9 INJECTION, SOLUTION INTRAVENOUS at 06:27

## 2022-12-09 RX ADMIN — SODIUM CHLORIDE, PRESERVATIVE FREE 10 ML: 5 INJECTION INTRAVENOUS at 23:07

## 2022-12-09 RX ADMIN — Medication: at 00:09

## 2022-12-09 NOTE — WOUND CARE
WOCN Note    Placed patient on prius air mattress with assistance of PCT. Will continue to follow.     BASIL Powell RN [facility] Inpatient Wound Care  Available on Perfect Serve  Office [phone]

## 2022-12-09 NOTE — PROGRESS NOTES
OH:  1. RUR-18%  2. Admitted from [address], originally at [address] [facility] group home. 3. BLS transport. 4. Waiting cultures. 5. PT/OT orders- evals pending. Contact personRamakrishna Zuñiga ISDCJ-[phone]  -Renetta QTYBAGLI-[phone]    NICOLE spoke with patient Salvador Mullins from [facility] [phone]. CM provided update to her, and she is recommending the patient go to a rehab facility before returning to her group home. It is undetermined what the patient mobility needs are, therapy evals are pending. Patient father refused [address] transfer on Thursday. Cm will update father. 3333- NICOLE spoke with patient father, he is in agreement for his daughter to go to SNF and transition to LTC at the facility. His thoughts is she would get 24/7 care at the facility vs at the group home.        Shaila ArchibaldNortheast Kansas Center for Health and Wellness

## 2022-12-09 NOTE — PROGRESS NOTES
Problem: Dysphagia (Adult)  Goal: *Acute Goals and Plan of Care (Insert Text)  Description: Speech Pathology Goals  Initiated 12/7/2022    1. Patient will tolerate baseline diet with no difficulties or adverse effects within 7 days. Outcome: Resolved/Met   SPEECH LANGUAGE PATHOLOGY DYSPHAGIA TREATMENT/ DISCHARGE  Patient: Jesus Cuveas (56 y.o. female)  Date: 12/9/2022  Diagnosis: Hospital-acquired pneumonia [J18.9, Y95] <principal problem not specified>      Precautions:fall      ASSESSMENT:  Patient sleeping upon SLP arrival, but easily roused. Fed lunch tray of puree with moderately thick liquids. Patient with adequate acceptance of teaspoon amounts of food with tongue thrusting noted for AP propulsion. There was no oral residue. Note cough x 2 during entire meal. This did not appear related to any particular consistency. She appears to be tolerating her baseline diet. PLAN:  Recommendations and Planned Interventions:  Puree with moderately thick liquids  Patient continues to benefit from skilled intervention to address the above impairments. Continue treatment per established plan of care. Discharge Recommendations:  None     SUBJECTIVE:   Patient nonverbal    OBJECTIVE:   Cognitive and Communication Status:  Neurologic State: Alert  Orientation Level: Unable to verbalize  Cognition: No command following  Perception:  (unable to assess)  Perseveration: No perseveration noted  Safety/Judgement: Decreased awareness of environment  Dysphagia Treatment:  Oral Assessment:  Oral Assessment  Dentition: Limited  Oral Hygiene: moist  Lingual:  (tongue thrusting)  Mandible: No impairment  P.O. Trials:  Patient Position: upright in bed  Vocal quality prior to P.O.:  (no vocalizations)  Consistency Presented: Puree; Honey thick liquid  How Presented: SLP-fed/presented;Spoon     Bolus Acceptance: No impairment  Bolus Formation/Control: Impaired  Type of Impairment: Lip closure  Propulsion: Discoordination; Tongue pumping  Oral Residue: None     Laryngeal Elevation: Absent                                                                                                                                                  After treatment:   Patient left in no apparent distress in bed and Call bell within reach    COMMUNICATION/EDUCATION:   Patient was educated regarding purpose of SLP visit. She was non-communicative but did eat.      The patient's plan of care including recommendations, planned interventions, and recommended diet changes were discussed with: Speech therapist.     Rachael Ga, SLP  Time Calculation: 22 mins

## 2022-12-09 NOTE — PROGRESS NOTES
NUTRITION  Reason for Assessment: Initial      Recommendations/Interventions/Plan:   Continue pureed diet with moderately thick liquids per SLP  Continue ensure pudding, magic cup once daily  Nursing- Thank you for feeding      Will rescreen per policy       Past Medical History:   Diagnosis Date    Constipation     Hypotension     Psychomotor retardation     severe    Seizure disorder ([address]        Pt screened for dysphagia diet order. Chart/labs/meds reviewed. Admitted with Hospital-acquired pneumonia [J18.9, Y95]. Pt is nonverbal, needs to be fed d/t psychomotor retardation. Seen by SLP who recommended pureed diet with moderately thick liquids. Visited pt at bedside where RN was feeding her breakfast. 100% of tray was eaten, and they were working on an ensure pudding. Per RN no issues with intake. 130# wt in wt history inaccurate- appear to be carried over. There's a wt from CE in 10/22 which is 111#, bedscale wt this week 110# so no concern for recent wt loss. No overt nutrition concerns at this time. Please consult should any arise. Nutrition Related Findings:   Edema: LLE: Trace (12/9/2022 12:10 AM)  RLE: Trace (12/9/2022 12:10 AM)      Last BM: 12/07/22, Soft, Formed    Skin: WNL      Current Nutrition Therapies:  Diet: pureed with moderately thick liquids  Supplements: ensure pudding and magic cup once daily  Meal intake: Patient Vitals for the past 168 hrs:   % Diet Eaten   12/09/22 1112 76 - 100%   12/08/22 1201 1 - 25%   12/07/22 1837 51 - 75%     Supplement intake: No data found.       Weight Hx:  Wt Readings from Last 10 Encounters:   12/07/22 49.9 kg (110 lb 1.6 oz)   11/29/22 59 kg (130 lb)   09/02/22 59 kg (130 lb)   08/29/22 59 kg (130 lb)   08/10/22 59 kg (130 lb)   02/14/22 59 kg (130 lb)   01/08/21 50.3 kg (111 lb)   06/08/20 48.1 kg (106 lb)   03/20/20 48.2 kg (106 lb 4.2 oz)   01/06/20 46.3 kg (102 lb)         Estimated Nutrition Needs:   Energy: 3262-9692 (25-30 kcal/kg)  Wt used: Current  Protein: 60 (1.2 g/kg)  Wt used: Current   Fluid: 1 ml/kcal       Recent Labs     12/09/22  0231 12/08/22  0449 12/07/22  0435   * 85 50*   BUN 11 11 18   CREA 0.50* 0.48* 0.58    140 139   K 3.9 3.8 4.3   * 104 105   CO2 28 32 26   CA 8.1* 8.6 8.9   PHOS 3.9 3.4 3.3   MG 1.9 2.1 1.5*       Recent Labs     12/09/22  1142 12/09/22  0645 12/09/22  0018 12/08/22  1804 12/08/22  1214 12/07/22  1704 12/07/22  1158 12/07/22  0635 12/07/22  0541 12/07/22  0540   GLUCPOC 128* 80 99 103 118* 111 109 194* 52* 48*       Lab Results   Component Value Date/Time    Hemoglobin A1c 5.5 08/10/2022 02:07 PM    Hemoglobin A1c 5.3 01/08/2021 11:45 AM         Bg Zhou RD  Available via [email or web address]

## 2022-12-09 NOTE — PROGRESS NOTES
Problem: Mobility Impaired (Adult and Pediatric)  Goal: *Acute Goals and Plan of Care (Insert Text)  Description: FUNCTIONAL STATUS PRIOR TO ADMISSION: Chart indicates patient is bed bound. Spoke with CM and patient was ambulatory at group home within last year (last time ambulating specifically is unknown). Per CM, father stated that patient was ambulatory at adult group home. HOME SUPPORT PRIOR TO ADMISSION: Recently moved to a new adult group home. Physical Therapy Goals  Initiated 12/9/2022  1. Patient will move from supine to sit and sit to supine , scoot up and down, and roll side to side in bed with minimal assistance within 7 day(s). 2.  Patient will transfer from bed to chair and chair to bed with moderate assistance using the least restrictive device within 7 day(s). 3.  Patient will perform sit to stand with moderate assistance  within 7 day(s). 4.  Patient will ambulate with moderate assistance for 10 feet with the least restrictive device within 7 day(s). Outcome: Progressing Towards Goal   PHYSICAL THERAPY EVALUATION  Patient: Janae Yip (37 y.o. female)  Date: 12/9/2022  Primary Diagnosis: Hospital-acquired pneumonia [J18.9, Y95]  Precautions:   Fall, Seizure    ASSESSMENT  Based on the objective data described below, the patient presents with grossly impaired functional mobility (total assist for bed mobility), increased tone in extremities, and grossly impaired AROM following admission for HAP. Receive in bed in fetal position in sidelying. She was able to participate in unsupported sitting EOB, and was able to obtain her sitting balance with increased time. Initially very resistant to all assist and pushing into therapist's hands when assisting with sitting EOB. Sat with kyphotic posture and noted scoliosis in R curvature.  Does not follow commands due to intellectual delay (hx sz disorder and per chart review, father reported to MD that she is cognitively at a 3year old level). Per CM note, patient's community  has recommended SNF. If accepted at SNF, may be able to participate in rote tasks for mobility to improve strength, balance, and endurance. Will continue to assess. Current Level of Function Impacting Discharge (mobility/balance): total assist     Functional Outcome Measure: The patient scored 0/28 on the Tinetti outcome measure which is indicative of high fall risk. Other factors to consider for discharge: intellectual delay     Patient will benefit from skilled therapy intervention to address the above noted impairments. PLAN :  Recommendations and Planned Interventions: bed mobility training, transfer training, gait training, therapeutic exercises, neuromuscular re-education, patient and family training/education, and therapeutic activities      Frequency/Duration: Patient will be followed by physical therapy:  3 times a week to address goals. Recommendation for discharge: (in order for the patient to meet his/her long term goals)  Therapy up to 5 days/week in SNF setting    This discharge recommendation:  Has been made in collaboration with the attending provider and/or case management    IF patient discharges home will need the following DME: to be determined (TBD)         SUBJECTIVE:   Patient stated Michelel Cortez.     OBJECTIVE DATA SUMMARY:   HISTORY:    Past Medical History:   Diagnosis Date    Constipation     Hypotension     Psychomotor retardation     severe    Seizure disorder [facility])      Past Surgical History:   Procedure Laterality Date    COLONOSCOPY N/A 11/18/2019    COLONOSCOPY performed by Johanna Hicks MD at [facility]  11/18/2019            Personal factors and/or comorbidities impacting plan of care: [facility]    Home Situation  Home Environment: Group home  One/Two Story Residence: One story  Living Alone: No  Support Systems: Adult Group Home, Parent(s)  Patient Expects to be Discharged to[de-identified] Other:    EXAMINATION/PRESENTATION/DECISION MAKING:   Critical Behavior:  Neurologic State: Alert  Orientation Level: Unable to verbalize  Cognition: No command following  Safety/Judgement: Decreased awareness of environment  Range Of Motion:  AROM: Grossly decreased, non-functional  Strength:    Strength: Generally decreased, functional  Tone & Sensation:   Tone: Abnormal (low midline, increased extremities)  Sensation:  (unable to assess)  Coordination:  Coordination: Grossly decreased, non-functional  Functional Mobility:  Bed Mobility:  Supine to Sit: Total assistance  Sit to Supine: Total assistance  Scooting: Total assistance  Balance:   Sitting: Impaired; Without support  Sitting - Static: Good (unsupported); Fair (occasional)  Standing:  (unable to achieve standing)    Functional Measure:  Tinetti test:    Sitting Balance: 0  Arises: 0  Attempts to Rise: 0  Immediate Standing Balance: 0  Standing Balance: 0  Nudged: 0  Eyes Closed: 0  Turn 360 Degrees - Continuous/Discontinuous: 0  Turn 360 Degrees - Steady/Unsteady: 0  Sitting Down: 0  Balance Score: 0 Balance total score  Indication of Gait: 0  R Step Length/Height: 0  L Step Length/Height: 0  R Foot Clearance: 0  L Foot Clearance: 0  Step Symmetry: 0  Step Continuity: 0  Path: 0  Trunk: 0  Walking Time: 0  Gait Score: 0 Gait total score  Total Score: 0/28 Overall total score         Tinetti Tool Score Risk of Falls  <19 = High Fall Risk  19-24 = Moderate Fall Risk  25-28 = Low Fall Risk  Tinetti ME. Performance-Oriented Assessment of Mobility Problems in Elderly Patients. [address] 68; [id number].  (Scoring Description: PT Bulletin Feb. 10, 1993)    Older adults: Shelby Cosme et al, 2009; n = 1000 [facility] elderly evaluated with ABC, IGOR, ADL, and IADL)  · Mean IGOR score for males aged 69-68 years = 26.21(3.40)  · Mean IGOR score for females age 69-68 years = 25.16(4.30)  · Mean IGOR score for males over 80 years = 23.29(6.02)  · Mean IGOR score for females over 80 years = 17.20(8.32)            Physical Therapy Evaluation Charge Determination   History Examination Presentation Decision-Making   HIGH Complexity :3+ comorbidities / personal factors will impact the outcome/ POC  HIGH Complexity : 4+ Standardized tests and measures addressing body structure, function, activity limitation and / or participation in recreation  LOW Complexity : Stable, uncomplicated  Other outcome measures Tinetti 0/28  HIGH       Based on the above components, the patient evaluation is determined to be of the following complexity level: LOW     Pain Rating:  Did not appear to be in pain    Activity Tolerance:   Fair    After treatment patient left in no apparent distress:   Supine in bed, Heels elevated for pressure relief, Call bell within reach, and Side rails x 3    COMMUNICATION/EDUCATION:   The patients plan of care was discussed with: Registered nurse and Case management. Fall prevention education was provided and the patient/caregiver indicated understanding., Patient/family have participated as able in goal setting and plan of care. , and Patient/family agree to work toward stated goals and plan of care.     Thank you for this referral.  Mily Sofia, PT, DPT   Time Calculation: 14 mins

## 2022-12-09 NOTE — PROGRESS NOTES
[address] [facility] Adult  Hospitalist Group                                                                                          Hospitalist Progress Note  Karen Rivas MD  Answering service: [phone] or [phone] from in house phone        Date of Service:  2022  NAME:  Flower Shrestha  :  1965  MRN:  [id number]      Admission Summary:   Flower Shrestha is a 56 y.o. 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 VSS, no oxygen requirement. ROS unable to be obtained given patient clinical status. Appears comfortable on exam, sleeping but easily awakens. Assessment & Plan:     Aspiration pneumonia  --Dysphagia diet per speech, eating well   --Stop IVFs  --s/p cefepime, continue rocephin     Positive blood culture without bacteremia   - 1/2 bottles positive for coag-negative Staph, likely contaminant given no leukocytosis and Biofire positive for Staph but not MRSA or MSSA, likely staph epi   - No repeat cultures  - Repeat blood cultures pending, will trend for 24-48h to ensure negativity     Pressure wounds  Wound, Pressure Prevention & Skin Care Recommendations:    Minimize layers of linen/pads under patient to optimize support surface. 2.  Turn/reposition approximately every 2 hours and offload heels.   Patient mobility team to assist with q2 turns.   3.  Manage moisture/ Keep skin folds clean and dry/minimize brief usage. 4.  Specialty bed: Prius air mattress ordered from [facility] [place], operated by [facility]. 5.  Red erythema to left hip, left foot, right knee and right great toe:  Apply Venelex TID and cover all areas with foam dressings. 6.  Heels:  Protect heels with foam dressing; changed weekly and as needed. Acute on chronic hypotension, resolved  --Currently blood pressure stable patient unable to take midodrine at the setting watch for now    Anemia  -- Anemia of chronic disease  -- Supplement with iron    Psychomotor retardation  Seizure disorder  --On valproate changed to IV due to decreased oral intake, will switch back to oral now that eating     Hypothyroidism   --Continue levothyroxine    Replete mag - repeat lab    Code status: DNR  DVT prophylaxis: Lovenox    Care Plan discussed with: Patient/Family and Nurse  Anticipated Disposition: SNF/LTC  Anticipated Discharge: Greater than 48 hours     Hospital Problems  Date Reviewed: 11/29/2022            Codes Class Noted POA    Hospital-acquired pneumonia ICD-10-CM: J18.9, Y95  ICD-9-CM: 186  12/6/2022 Unknown             Review of Systems:   Review of systems not obtained due to patient factors. Vital Signs:    Last 24hrs VS reviewed since prior progress note.  Most recent are:  Visit Vitals  /68 (BP 1 Location: Right upper arm, BP Patient Position: At rest)   Pulse 82   Temp 97.6 °F (36.4 °C)   Resp 17   Ht 5' 4\" (1.626 m)   Wt 49.9 kg (110 lb 1.6 oz)   SpO2 94%   BMI 18.90 kg/m²         Intake/Output Summary (Last 24 hours) at 12/9/2022 1722  Last data filed at 12/9/2022 1031  Gross per 24 hour   Intake --   Output 850 ml   Net -850 ml          Physical Examination:     I had a face to face encounter with this patient and independently examined them on 12/9/2022 as outlined below:          General : alert x 0, awake, no acute distress, global delays   HEENT: PEERL, EOMI, moist mucus membrane  Neck: supple, no JVD, no meningeal signs  Chest: Clear to auscultation bilaterally   CVS: S1 S2 heard, Capillary refill less than 2 seconds  Abd: soft/ Non tender, colostomy+  Ext: contracted  Neuro/Psych: pleasant mood and affect, contracted    Data Review:    I personally reviewed  Image and labs      Labs:     Recent Labs     12/09/22  0231 12/08/22 0449   WBC 6.9 8.1   HGB 8.7* 9.0*   HCT 26.3* 27.2*    197     Recent Labs     12/09/22  0231 12/08/22 0449 12/07/22  0435    140 139   K 3.9 3.8 4.3   * 104 105   CO2 28 32 26   BUN 11 11 18   CREA 0.50* 0.48* 0.58   * 85 50*   CA 8.1* 8.6 8.9   MG 1.9 2.1 1.5*   PHOS 3.9 3.4 3.3     No results for input(s): ALT, AP, TBIL, TBILI, TP, ALB, GLOB, GGT, AML, LPSE in the last 72 hours. No lab exists for component: SGOT, GPT, AMYP, HLPSE    No results for input(s): INR, PTP, APTT, INREXT, INREXT in the last 72 hours. No results for input(s): FE, TIBC, PSAT, FERR in the last 72 hours. Lab Results   Component Value Date/Time    Folate 6.5 08/29/2022 11:15 AM      No results for input(s): PH, PCO2, PO2 in the last 72 hours. No results for input(s): CPK, CKNDX, TROIQ in the last 72 hours.     No lab exists for component: CPKMB  Lab Results   Component Value Date/Time    Cholesterol, total 208 (H) 02/14/2022 10:30 AM    HDL Cholesterol 91 02/14/2022 10:30 AM    LDL, calculated 96 02/14/2022 10:30 AM    Triglyceride 121 02/14/2022 10:30 AM     Lab Results   Component Value Date/Time    Glucose (POC) 128 (H) 12/09/2022 11:42 AM    Glucose (POC) 80 12/09/2022 06:45 AM    Glucose (POC) 99 12/09/2022 12:18 AM    Glucose (POC) 103 12/08/2022 06:04 PM    Glucose (POC) 118 (H) 12/08/2022 12:14 PM     Lab Results   Component Value Date/Time    Color DARK YELLOW 12/05/2022 09:21 PM    Appearance CLEAR 12/05/2022 09:21 PM    Specific gravity 1.030 12/05/2022 09:21 PM    pH (UA) 5.5 12/05/2022 09:21 PM    Protein 30 (A) 12/05/2022 09:21 PM    Glucose Negative 12/05/2022 09:21 PM    Ketone 15 (A) 12/05/2022 09:21 PM    Bilirubin Negative 12/05/2022 09:21 PM    Urobilinogen 1.0 12/05/2022 09:21 PM    Nitrites Negative 12/05/2022 09:21 PM    Leukocyte Esterase MODERATE (A) 12/05/2022 09:21 PM    Epithelial cells FEW 12/05/2022 09:21 PM    Bacteria Negative 12/05/2022 09:21 PM    WBC 5-10 12/05/2022 09:21 PM    RBC 0-5 12/05/2022 09:21 PM         Medications Reviewed:     Current Facility-Administered Medications   Medication Dose Route Frequency    cefTRIAXone (ROCEPHIN) 1 g in 0.9% sodium chloride 10 mL IV syringe  1 g IntraVENous Q24H    dextrose 10 % infusion 125-250 mL  125-250 mL IntraVENous PRN    dextrose 5% and 0.9% NaCl infusion  75 mL/hr IntraVENous CONTINUOUS    gabapentin (NEURONTIN) tablet 1,200 mg  1,200 mg Oral TID    midodrine (PROAMATINE) tablet 10 mg  10 mg Oral ACB&D    valproate (DEPACON) 250 mg in 0.9% sodium chloride 50 mL IVPB  250 mg IntraVENous Q8H    enoxaparin (LOVENOX) injection 30 mg  30 mg SubCUTAneous DAILY    balsam peru-castor oiL (VENELEX) ointment   Topical Q8H    sodium chloride (NS) flush 5-40 mL  5-40 mL IntraVENous Q8H    sodium chloride (NS) flush 5-40 mL  5-40 mL IntraVENous PRN    acetaminophen (TYLENOL) tablet 650 mg  650 mg Oral Q6H PRN    Or    acetaminophen (TYLENOL) suppository 650 mg  650 mg Rectal Q6H PRN    polyethylene glycol (MIRALAX) packet 17 g  17 g Oral DAILY PRN    ondansetron (ZOFRAN ODT) tablet 4 mg  4 mg Oral Q8H PRN    Or    ondansetron (ZOFRAN) injection 4 mg  4 mg IntraVENous Q6H PRN    bisacodyL (DULCOLAX) suppository 10 mg  10 mg Rectal DAILY PRN    guaiFENesin-dextromethorphan (ROBITUSSIN DM) 100-10 mg/5 mL syrup 5 mL  5 mL Oral Q6H PRN    diazePAM (VALIUM) tablet 5 mg  5 mg Oral Q6H PRN    hydrOXYzine HCL (ATARAX) tablet 10 mg  10 mg Oral Q6H PRN    levothyroxine (SYNTHROID) tablet 50 mcg  50 mcg Oral ACB    melatonin tablet 3 mg  3 mg Oral QHS PRN    polyethylene glycol (MIRALAX) packet 17 g  17 g Oral DAILY    albuterol-ipratropium (DUO-NEB) 2.5 MG-0.5 MG/3 ML  3 mL Nebulization Q4H PRN     ______________________________________________________________________  EXPECTED LENGTH OF STAY: 3d 0h  ACTUAL LENGTH OF STAY:          3      Please note that this dictation was completed with [employer], the computer voice recognition software. Quite often unanticipated grammatical, syntax, homophones, and other interpretive errors are inadvertently transcribed by the computer software. Please disregard these errors. Please excuse any errors that have escaped final proofreading.                 Heidy Ly MD

## 2022-12-10 LAB
ANION GAP SERPL CALC-SCNC: 2 MMOL/L (ref 5–15)
APPEARANCE UR: CLEAR
BACTERIA URNS QL MICRO: NEGATIVE /HPF
BASOPHILS # BLD: 0 K/UL (ref 0–0.1)
BASOPHILS NFR BLD: 0 % (ref 0–1)
BILIRUB UR QL: NEGATIVE
BUN SERPL-MCNC: 18 MG/DL (ref 6–20)
BUN/CREAT SERPL: 39 (ref 12–20)
CALCIUM SERPL-MCNC: 8.4 MG/DL (ref 8.5–10.1)
CHLORIDE SERPL-SCNC: 113 MMOL/L (ref 97–108)
CO2 SERPL-SCNC: 31 MMOL/L (ref 21–32)
COLOR UR: ABNORMAL
CREAT SERPL-MCNC: 0.46 MG/DL (ref 0.55–1.02)
DIFFERENTIAL METHOD BLD: ABNORMAL
EOSINOPHIL # BLD: 0.2 K/UL (ref 0–0.4)
EOSINOPHIL NFR BLD: 2 % (ref 0–7)
EPITH CASTS URNS QL MICRO: ABNORMAL /LPF
ERYTHROCYTE [DISTWIDTH] IN BLOOD BY AUTOMATED COUNT: 16.5 % (ref 11.5–14.5)
GLUCOSE BLD STRIP.AUTO-MCNC: 120 MG/DL (ref 65–117)
GLUCOSE BLD STRIP.AUTO-MCNC: 76 MG/DL (ref 65–117)
GLUCOSE BLD STRIP.AUTO-MCNC: 80 MG/DL (ref 65–117)
GLUCOSE SERPL-MCNC: 81 MG/DL (ref 65–100)
GLUCOSE UR STRIP.AUTO-MCNC: NEGATIVE MG/DL
HCT VFR BLD AUTO: 26.4 % (ref 35–47)
HGB BLD-MCNC: 8.5 G/DL (ref 11.5–16)
HGB UR QL STRIP: ABNORMAL
IMM GRANULOCYTES # BLD AUTO: 0.1 K/UL (ref 0–0.04)
IMM GRANULOCYTES NFR BLD AUTO: 1 % (ref 0–0.5)
KETONES UR QL STRIP.AUTO: NEGATIVE MG/DL
LEUKOCYTE ESTERASE UR QL STRIP.AUTO: ABNORMAL
LYMPHOCYTES # BLD: 2.3 K/UL (ref 0.8–3.5)
LYMPHOCYTES NFR BLD: 33 % (ref 12–49)
MAGNESIUM SERPL-MCNC: 2 MG/DL (ref 1.6–2.4)
MCH RBC QN AUTO: 32.3 PG (ref 26–34)
MCHC RBC AUTO-ENTMCNC: 32.2 G/DL (ref 30–36.5)
MCV RBC AUTO: 100.4 FL (ref 80–99)
MONOCYTES # BLD: 1.1 K/UL (ref 0–1)
MONOCYTES NFR BLD: 16 % (ref 5–13)
NEUTS SEG # BLD: 3.3 K/UL (ref 1.8–8)
NEUTS SEG NFR BLD: 48 % (ref 32–75)
NITRITE UR QL STRIP.AUTO: NEGATIVE
NRBC # BLD: 0 K/UL (ref 0–0.01)
NRBC BLD-RTO: 0 PER 100 WBC
PH UR STRIP: 7.5 [PH] (ref 5–8)
PHOSPHATE SERPL-MCNC: 3.4 MG/DL (ref 2.6–4.7)
PLATELET # BLD AUTO: 229 K/UL (ref 150–400)
PMV BLD AUTO: 11.3 FL (ref 8.9–12.9)
POTASSIUM SERPL-SCNC: 4.3 MMOL/L (ref 3.5–5.1)
PROT UR STRIP-MCNC: 30 MG/DL
RBC # BLD AUTO: 2.63 M/UL (ref 3.8–5.2)
RBC #/AREA URNS HPF: >100 /HPF (ref 0–5)
SERVICE CMNT-IMP: ABNORMAL
SERVICE CMNT-IMP: NORMAL
SERVICE CMNT-IMP: NORMAL
SODIUM SERPL-SCNC: 146 MMOL/L (ref 136–145)
SP GR UR REFRACTOMETRY: 1.02 (ref 1–1.03)
UA: UC IF INDICATED,UAUC: ABNORMAL
UROBILINOGEN UR QL STRIP.AUTO: 0.2 EU/DL (ref 0.2–1)
WBC # BLD AUTO: 7 K/UL (ref 3.6–11)
WBC URNS QL MICRO: ABNORMAL /HPF (ref 0–4)

## 2022-12-10 PROCEDURE — 83735 ASSAY OF MAGNESIUM: CPT

## 2022-12-10 PROCEDURE — 74011000250 HC RX REV CODE- 250: Performed by: NURSE PRACTITIONER

## 2022-12-10 PROCEDURE — 97535 SELF CARE MNGMENT TRAINING: CPT

## 2022-12-10 PROCEDURE — 74011250637 HC RX REV CODE- 250/637: Performed by: STUDENT IN AN ORGANIZED HEALTH CARE EDUCATION/TRAINING PROGRAM

## 2022-12-10 PROCEDURE — 85025 COMPLETE CBC W/AUTO DIFF WBC: CPT

## 2022-12-10 PROCEDURE — 80048 BASIC METABOLIC PNL TOTAL CA: CPT

## 2022-12-10 PROCEDURE — 84100 ASSAY OF PHOSPHORUS: CPT

## 2022-12-10 PROCEDURE — 36415 COLL VENOUS BLD VENIPUNCTURE: CPT

## 2022-12-10 PROCEDURE — 74011250637 HC RX REV CODE- 250/637: Performed by: NURSE PRACTITIONER

## 2022-12-10 PROCEDURE — 74011250636 HC RX REV CODE- 250/636: Performed by: STUDENT IN AN ORGANIZED HEALTH CARE EDUCATION/TRAINING PROGRAM

## 2022-12-10 PROCEDURE — 74011250637 HC RX REV CODE- 250/637: Performed by: HOSPITALIST

## 2022-12-10 PROCEDURE — 81001 URINALYSIS AUTO W/SCOPE: CPT

## 2022-12-10 PROCEDURE — 97165 OT EVAL LOW COMPLEX 30 MIN: CPT

## 2022-12-10 PROCEDURE — 65270000029 HC RM PRIVATE

## 2022-12-10 PROCEDURE — 82962 GLUCOSE BLOOD TEST: CPT

## 2022-12-10 PROCEDURE — 74011000250 HC RX REV CODE- 250: Performed by: STUDENT IN AN ORGANIZED HEALTH CARE EDUCATION/TRAINING PROGRAM

## 2022-12-10 RX ADMIN — DIVALPROEX SODIUM 250 MG: 250 TABLET, DELAYED RELEASE ORAL at 15:28

## 2022-12-10 RX ADMIN — GABAPENTIN 1200 MG: 600 TABLET, FILM COATED ORAL at 09:59

## 2022-12-10 RX ADMIN — Medication: at 06:38

## 2022-12-10 RX ADMIN — GABAPENTIN 1200 MG: 600 TABLET, FILM COATED ORAL at 21:21

## 2022-12-10 RX ADMIN — SODIUM CHLORIDE, PRESERVATIVE FREE 10 ML: 5 INJECTION INTRAVENOUS at 15:29

## 2022-12-10 RX ADMIN — Medication: at 15:29

## 2022-12-10 RX ADMIN — DIVALPROEX SODIUM 250 MG: 250 TABLET, DELAYED RELEASE ORAL at 09:59

## 2022-12-10 RX ADMIN — CEFTRIAXONE SODIUM 1 G: 1 INJECTION, POWDER, FOR SOLUTION INTRAMUSCULAR; INTRAVENOUS at 21:19

## 2022-12-10 RX ADMIN — DIVALPROEX SODIUM 250 MG: 250 TABLET, DELAYED RELEASE ORAL at 21:21

## 2022-12-10 RX ADMIN — SODIUM CHLORIDE, PRESERVATIVE FREE 10 ML: 5 INJECTION INTRAVENOUS at 06:38

## 2022-12-10 RX ADMIN — LEVOTHYROXINE SODIUM 50 MCG: 0.05 TABLET ORAL at 06:38

## 2022-12-10 RX ADMIN — POLYETHYLENE GLYCOL 3350 17 G: 17 POWDER, FOR SOLUTION ORAL at 09:59

## 2022-12-10 RX ADMIN — MIDODRINE HYDROCHLORIDE 10 MG: 5 TABLET ORAL at 06:38

## 2022-12-10 RX ADMIN — GABAPENTIN 1200 MG: 600 TABLET, FILM COATED ORAL at 15:28

## 2022-12-10 RX ADMIN — Medication: at 21:21

## 2022-12-10 RX ADMIN — SODIUM CHLORIDE, PRESERVATIVE FREE 10 ML: 5 INJECTION INTRAVENOUS at 21:21

## 2022-12-10 NOTE — PROGRESS NOTES
Problem: Self Care Deficits Care Plan (Adult)  Goal: *Acute Goals and Plan of Care (Insert Text)  Note: FUNCTIONAL STATUS PRIOR TO ADMISSION: Patient required total assist for basic and instrumental ADLs. However, per nursing, pt was able to walk within the last year. HOME SUPPORT:  Patient lives in a group home. Occupational Therapy Goals  Initiated 12/10/2022  1. Patient will perform self-feeding with moderate assistance  within 7 day(s). 2.  Patient will perform grooming with maximal assistance within 7 day(s). 3.  Patient will follow simple 1 step command with 50% accuracy in prep for ADLs within 7 days  4. Patient will tolerate sitting EOB with MAX A in prep for ADL/transfers within 7 days. OCCUPATIONAL THERAPY EVALUATION  Patient: Fara Clay (89 y.o. female)  Date: 12/10/2022  Primary Diagnosis: Hospital-acquired pneumonia [J18.9, Y95]       Precautions:   Fall, Seizure    ASSESSMENT  Based on the objective data described below, the patient presents with abnormal muscle tone, fatigue, decreased activity tolerance s/p admission for hospital acquired pneumonia. Greeted resting in bed with nursing at bed side. Total A for rolling. Pt does respond and reposition self to preferred sidelying in fetal position despite attempts to adjust positioning. Did not appear to follow simple 1 step commands or correctly acknowledge yes/no questions. However did increase participation with self-feeding. Pt appears very motivated by food and interested in feeding. Per nursing, at one point, pt was mobile. Unclear timeline. Pt appears with some soft tissue shortening, but not set-contractures. Per CM note, patient's community  has recommended SNF. If accepted at SNF, she may be able to participate in rote tasks for ADLs to improve strength, balance, and activity tolerance/endurance. Will continue to assess for most appropriate discharge recommendation.     Current Level of Function Impacting Discharge (ADLs/self-care): total A    Functional Outcome Measure: The patient scored 0/100 on the Barthel Index outcome measure. Other factors to consider for discharge:      Patient will benefit from skilled therapy intervention to address the above noted impairments. PLAN :  Recommendations and Planned Interventions: self care training, functional mobility training, therapeutic exercise, therapeutic activities, endurance activities, patient education, home safety training, and family training/education    Frequency/Duration: Patient will be followed by occupational therapy 3 times a week to address goals. Recommendation for discharge: (in order for the patient to meet his/her long term goals)  Therapy up to 5 days/week in SNF setting    This discharge recommendation:  Has not yet been discussed the attending provider and/or case management    IF patient discharges home will need the following DME: TBD       SUBJECTIVE:   Patient non verbal, however did smile while eating. OBJECTIVE DATA SUMMARY:   HISTORY:   Past Medical History:   Diagnosis Date    Constipation     Hypotension     Psychomotor retardation     severe    Seizure disorder [facility])      Past Surgical History:   Procedure Laterality Date    COLONOSCOPY N/A 11/18/2019    COLONOSCOPY performed by Magalys Denise MD at [facility]  11/18/2019            Expanded or extensive additional review of patient history:     Home Situation  Home Environment: Group home  One/Two Story Residence: One story  Living Alone: No  Support Systems: Adult Group Home, Parent(s)  Patient Expects to be Discharged to[de-identified] Other:    Hand dominance: unable to assess    EXAMINATION OF PERFORMANCE DEFICITS:  Cognitive/Behavioral Status:  Neurologic State: Alert  Orientation Level: Unable to verbalize  Cognition: Decreased attention/concentration;Decreased command following; Unable to assess (comment)             Skin: see nursing    Edema: none observed    Hearing:       Vision/Perceptual:                                     Range of Motion:    AROM: Grossly decreased, non-functional                         Strength:    Strength: Generally decreased, functional                Coordination:  Coordination: Grossly decreased, non-functional  Fine Motor Skills-Upper: Right Impaired;Left Impaired    Gross Motor Skills-Upper: Right Impaired;Left Impaired    Tone & Sensation:    Tone: Abnormal  Sensation:  (unable to assess)                      Balance:       Functional Mobility and Transfers for ADLs:  Bed Mobility:  Rolling: Total assistance    Transfers:       ADL Assessment:                 Type of Bath: Chlorhexidine (CHG)                                 ADL Intervention and task modifications:  Feeding  Feeding Assistance: Maximum assistance  Container Management: Total assistance (dependent)              Type of Bath: Chlorhexidine (CHG)                                  Functional Measure:    Barthel Index:  Bathin  Bladder: 0  Bowels: 0  Groomin  Dressin  Feedin  Mobility: 0  Stairs: 0  Toilet Use: 0  Transfer (Bed to Chair and Back): 0  Total: 0/100      The Barthel ADL Index: Guidelines  1. The index should be used as a record of what a patient does, not as a record of what a patient could do. 2. The main aim is to establish degree of independence from any help, physical or verbal, however minor and for whatever reason. 3. The need for supervision renders the patient not independent. 4. A patient's performance should be established using the best available evidence. Asking the patient, friends/relatives and nurses are the usual sources, but direct observation and common sense are also important. However direct testing is not needed. 5. Usually the patient's performance over the preceding 24-48 hours is important, but occasionally longer periods will be relevant.   6. Middle categories imply that the patient supplies over 50 per cent of the effort. 7. Use of aids to be independent is allowed. Score Interpretation (from [facility] 83)    Independent   60-79 Minimally independent   40-59 Partially dependent   20-39 Very dependent   <20 Totally dependent     -Iram Fu., Barthel, D.W. (1965). Functional evaluation: the Barthel Index. [address] ([address] [place] [address] (1997). The Barthel activities of daily living index: self-reporting versus actual performance in the old (> or = 75 years). Journal of [address] [place] [address] 45(7), [facility], J.RICH.EILEENF, Aarti Jane., Eric Armstrong (1999). Measuring the change in disability after inpatient rehabilitation; comparison of the responsiveness of the Barthel Index and Functional [place] Measure. Journal of Neurology, Neurosurgery, and Psychiatry, 66(4), 858-623. ROSS Braden.KATIE, SAEED Fields, & Lo Nance M.A. (2004) Assessment of post-stroke quality of life in cost-effectiveness studies: The usefulness of the Barthel Index and the EuroQoL-5D. Quality of Life Research, 15, 073-97         Occupational Therapy Evaluation Charge Determination   History Examination Decision-Making   LOW Complexity : Brief history review  MEDIUM Complexity : 3-5 performance deficits relating to physical, cognitive , or psychosocial skils that result in activity limitations and / or participation restrictions MEDIUM Complexity : Patient may present with comorbidities that affect occupational performnce.  Miniml to moderate modification of tasks or assistance (eg, physical or verbal ) with assesment(s) is necessary to enable patient to complete evaluation       Based on the above components, the patient evaluation is determined to be of the following complexity level: MEDIUM  Pain Rating:  Not rated    Activity Tolerance:   Fair    After treatment patient left in no apparent distress:    Supine in bed and Call bell within reach    COMMUNICATION/EDUCATION:   The patients plan of care was discussed with: Registered nurse and Physician. Home safety education was provided and the patient/caregiver indicated understanding. and Patient/family have participated as able in goal setting and plan of care. This patients plan of care is appropriate for delegation to [facility].     Thank you for this referral.  Henry Menendez OT  Time Calculation: 27 mins

## 2022-12-10 NOTE — PROGRESS NOTES
Problem: Falls - Risk of  Goal: *Absence of Falls  Description: Document Monserrat Velez Fall Risk and appropriate interventions in the flowsheet.   Outcome: Progressing Towards Goal  Note: Fall Risk Interventions:       Mentation Interventions: Adequate sleep, hydration, pain control    Medication Interventions: Bed/chair exit alarm    Elimination Interventions: Bed/chair exit alarm

## 2022-12-10 NOTE — PROGRESS NOTES
[address] [facility] Adult  Hospitalist Group                                                                                          Hospitalist Progress Note  Ivett Alcaraz MD  Answering service: [phone] or [phone] from in house phone        Date of Service:  12/10/2022  NAME:  Ashley Dennison  :  1965  MRN:  [id number]      Admission Summary:   Ashley Dennison is a 56 y.o. 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 with concerns for aspiration pneumonia, patient had an episode of vomiting yesterday, decreased mental status today, had a chest x-ray, concern for multifocal pneumonia, admitted to [facility], became hypotensive, put on Levophed, admitted by ICU team and then transferred to the hospitalist team for further management and evaluation, no further history could be obtained from the patient    Interval history / Subjective:   Was informed yesterday evening by nursing of concern for hematuria. UA ordered and prophylatic lovenox was stopped. Patient with nose bleed this morning that self-resolved. Assessment & Plan:     Aspiration pneumonia  --Dysphagia diet per speech, eating well   --s/p cefepime, continue rocephin (end today, 5 total days)    ? Hematuria  - Per nursing, urine in araujo yesterday evening was concerning for hematuria  - Remove araujo, obtain straight cath UA   - Hold anticoagulation, SCDs in place    Positive blood culture without bacteremia   - 1/2 bottles positive for coag-negative Staph, likely contaminant given no leukocytosis and Biofire positive for Staph but not MRSA or MSSA, suspect staph epi   - Repeat blood cultures pending, thus far no growth to date    Pressure wounds  Wound, Pressure Prevention & Skin Care Recommendations:    Minimize layers of linen/pads under patient to optimize support surface. 2.  Turn/reposition approximately every 2 hours and offload heels. Patient mobility team to assist with q2 turns. 3.  Manage moisture/ Keep skin folds clean and dry/minimize brief usage. 4.  Specialty bed: Prius air mattress ordered from [facility]. 5.  Red erythema to left hip, left foot, right knee and right great toe:  Apply Venelex TID and cover all areas with foam dressings. 6.  Heels:  Protect heels with foam dressing; changed weekly and as needed. Acute on chronic hypotension, resolved  --Currently blood pressure stable patient unable to take midodrine at the setting watch for now    Anemia  -- Anemia of chronic disease, stable   -- Supplement with iron    Psychomotor retardation  Seizure disorder  --Continue home depakote     Hypothyroidism   --Continue levothyroxine    Replete mag - repeat lab    Code status: DNR  DVT prophylaxis: Lovenox    Care Plan discussed with: Patient/Family and Nurse  Anticipated Disposition: SNF/LTC  Anticipated Discharge: Greater than 48 hours     Hospital Problems  Date Reviewed: 11/29/2022            Codes Class Noted POA    Hospital-acquired pneumonia ICD-10-CM: J18.9, Y95  ICD-9-CM: 486  12/6/2022 Unknown             Review of Systems:   Review of systems not obtained due to patient factors. Vital Signs:    Last 24hrs VS reviewed since prior progress note.  Most recent are:  Visit Vitals  /71 (BP 1 Location: Right upper arm, BP Patient Position: At rest)   Pulse 74   Temp 97.4 °F (36.3 °C)   Resp 16   Ht 5' 4\" (1.626 m)   Wt 49.9 kg (110 lb 1.6 oz)   SpO2 98%   BMI 18.90 kg/m²       No intake or output data in the 24 hours ending 12/10/22 1219         Physical Examination:     I had a face to face encounter with this patient and independently examined them on 12/10/2022 as outlined below:          General : alert x 0, awake, no acute distress, global delays   HEENT: PEERL, EOMI, moist mucus membrane  Neck: supple, no JVD, no meningeal signs  Chest: Clear to auscultation bilaterally   CVS: S1 S2 heard, Capillary refill less than 2 seconds  Abd: soft/ Non tender, colostomy+  Ext: contracted  Neuro/Psych: pleasant mood and affect, contracted      Data Review:    I personally reviewed  Image and labs      Labs:     Recent Labs     12/10/22  0402 12/09/22  0231   WBC 7.0 6.9   HGB 8.5* 8.7*   HCT 26.4* 26.3*    197     Recent Labs     12/10/22  0402 12/09/22  0231 12/08/22  0449   * 142 140   K 4.3 3.9 3.8   * 110* 104   CO2 31 28 32   BUN 18 11 11   CREA 0.46* 0.50* 0.48*   GLU 81 108* 85   CA 8.4* 8.1* 8.6   MG 2.0 1.9 2.1   PHOS 3.4 3.9 3.4     No results for input(s): ALT, AP, TBIL, TBILI, TP, ALB, GLOB, GGT, AML, LPSE in the last 72 hours. No lab exists for component: SGOT, GPT, AMYP, HLPSE    No results for input(s): INR, PTP, APTT, INREXT, INREXT in the last 72 hours. No results for input(s): FE, TIBC, PSAT, FERR in the last 72 hours. Lab Results   Component Value Date/Time    Folate 6.5 08/29/2022 11:15 AM      No results for input(s): PH, PCO2, PO2 in the last 72 hours. No results for input(s): CPK, CKNDX, TROIQ in the last 72 hours.     No lab exists for component: CPKMB  Lab Results   Component Value Date/Time    Cholesterol, total 208 (H) 02/14/2022 10:30 AM    HDL Cholesterol 91 02/14/2022 10:30 AM    LDL, calculated 96 02/14/2022 10:30 AM    Triglyceride 121 02/14/2022 10:30 AM     Lab Results   Component Value Date/Time    Glucose (POC) 76 12/10/2022 11:27 AM    Glucose (POC) 80 12/10/2022 07:01 AM    Glucose (POC) 100 12/09/2022 05:37 PM    Glucose (POC) 128 (H) 12/09/2022 11:42 AM    Glucose (POC) 80 12/09/2022 06:45 AM     Lab Results   Component Value Date/Time    Color DARK YELLOW 12/05/2022 09:21 PM    Appearance CLEAR 12/05/2022 09:21 PM    Specific gravity 1.030 12/05/2022 09:21 PM    pH (UA) 5.5 12/05/2022 09:21 PM    Protein 30 (A) 12/05/2022 09:21 PM Glucose Negative 12/05/2022 09:21 PM    Ketone 15 (A) 12/05/2022 09:21 PM    Bilirubin Negative 12/05/2022 09:21 PM    Urobilinogen 1.0 12/05/2022 09:21 PM    Nitrites Negative 12/05/2022 09:21 PM    Leukocyte Esterase MODERATE (A) 12/05/2022 09:21 PM    Epithelial cells FEW 12/05/2022 09:21 PM    Bacteria Negative 12/05/2022 09:21 PM    WBC 5-10 12/05/2022 09:21 PM    RBC 0-5 12/05/2022 09:21 PM         Medications Reviewed:     Current Facility-Administered Medications   Medication Dose Route Frequency    divalproex DR (DEPAKOTE) tablet 250 mg  250 mg Oral TID    cefTRIAXone (ROCEPHIN) 1 g in 0.9% sodium chloride 10 mL IV syringe  1 g IntraVENous Q24H    dextrose 10 % infusion 125-250 mL  125-250 mL IntraVENous PRN    gabapentin (NEURONTIN) tablet 1,200 mg  1,200 mg Oral TID    midodrine (PROAMATINE) tablet 10 mg  10 mg Oral ACB&D    balsam peru-castor oiL (VENELEX) ointment   Topical Q8H    sodium chloride (NS) flush 5-40 mL  5-40 mL IntraVENous Q8H    sodium chloride (NS) flush 5-40 mL  5-40 mL IntraVENous PRN    acetaminophen (TYLENOL) tablet 650 mg  650 mg Oral Q6H PRN    Or    acetaminophen (TYLENOL) suppository 650 mg  650 mg Rectal Q6H PRN    polyethylene glycol (MIRALAX) packet 17 g  17 g Oral DAILY PRN    ondansetron (ZOFRAN ODT) tablet 4 mg  4 mg Oral Q8H PRN    Or    ondansetron (ZOFRAN) injection 4 mg  4 mg IntraVENous Q6H PRN    bisacodyL (DULCOLAX) suppository 10 mg  10 mg Rectal DAILY PRN    guaiFENesin-dextromethorphan (ROBITUSSIN DM) 100-10 mg/5 mL syrup 5 mL  5 mL Oral Q6H PRN    diazePAM (VALIUM) tablet 5 mg  5 mg Oral Q6H PRN    hydrOXYzine HCL (ATARAX) tablet 10 mg  10 mg Oral Q6H PRN    levothyroxine (SYNTHROID) tablet 50 mcg  50 mcg Oral ACB    melatonin tablet 3 mg  3 mg Oral QHS PRN    polyethylene glycol (MIRALAX) packet 17 g  17 g Oral DAILY    albuterol-ipratropium (DUO-NEB) 2.5 MG-0.5 MG/3 ML  3 mL Nebulization Q4H PRN ______________________________________________________________________  EXPECTED LENGTH OF STAY: 3d 0h  ACTUAL LENGTH OF STAY:          4      Please note that this dictation was completed with CoverMyMeds, the computer voice recognition software. Quite often unanticipated grammatical, syntax, homophones, and other interpretive errors are inadvertently transcribed by the computer software. Please disregard these errors. Please excuse any errors that have escaped final proofreading.                 Melita Sams MD

## 2022-12-10 NOTE — PROGRESS NOTES
Transition of Care Plan  RUR 19%    Disposition   Pending medical and therapy progress and recommendations--[facility] group [facility] vs SNF  Therapy recommending SNF  Hospitalist nurse Peter Griffin agrees  [facility] and Medicaid--No authorization required UAI and Level 2 needed for placement  Referrals to   [employer] [phone] (via [facility])  [facility]  [phone] (cc link)  [address] [phone] (cc link)  [facility] [phone]  (CC link)    Transportation  BLS--patient bedbound    Contact  Legal Guardian , father  Darrius Mederos  [phone]    CM was informed from Dr Fareed Farris that patient will be likely be ready for discharge tomorrow. Therapy recommending SNF and regular CM talked with patient's father and he is in agreement with SNF  CM perfect serviced hospitalist nurse Peter Griffin asking her to review chart and advise CM on discharge plan--   /and then return to group home or LTC    Note  patient admitted from group home-- Ms Bird Arredondo is the owner [phone] ([facility] at [address] )    Contact at [facility]:  Alverto Purcell [phone]    Tyronne Cooks [id number]  Out patient CM  [facility]  Dylan May [phone]    UPDATE  4:15 pm  Response from Peter Griffin-- Snf   CM met with patient's father in patient's room  Patient is non verbal.. father in agreement with rehab and hopes patient can return to the group home and not need LTC. Father gave [facility], John Mulligan, [address] and [facility] as patient's father lives in the community. Referrals sent--    Note a Level  required for SNF placement. UAI started by NICOLE Skaggs    The Plan for Transition of Care is related to the following treatment goals: snf    The Patient and/or patient representative father was provided with a choice of provider and agrees   with the discharge plan.  [x] Yes [] No    Freedom of choice list was provided with basic dialogue that supports the patient's individualized plan of care/goals, treatment preferences and shares the quality data associated with the providers. [x] Yes [] No     CM will follow and assist with transition of care plan  GHANSHYAM. Sudha Hartman

## 2022-12-11 LAB
ANION GAP SERPL CALC-SCNC: 1 MMOL/L (ref 5–15)
BACTERIA SPEC CULT: ABNORMAL
BASOPHILS # BLD: 0 K/UL (ref 0–0.1)
BASOPHILS NFR BLD: 0 % (ref 0–1)
BUN SERPL-MCNC: 21 MG/DL (ref 6–20)
BUN/CREAT SERPL: 41 (ref 12–20)
CALCIUM SERPL-MCNC: 8.5 MG/DL (ref 8.5–10.1)
CHLORIDE SERPL-SCNC: 106 MMOL/L (ref 97–108)
CO2 SERPL-SCNC: 34 MMOL/L (ref 21–32)
CREAT SERPL-MCNC: 0.51 MG/DL (ref 0.55–1.02)
DIFFERENTIAL METHOD BLD: ABNORMAL
EOSINOPHIL # BLD: 0.1 K/UL (ref 0–0.4)
EOSINOPHIL NFR BLD: 1 % (ref 0–7)
ERYTHROCYTE [DISTWIDTH] IN BLOOD BY AUTOMATED COUNT: 15.9 % (ref 11.5–14.5)
GLUCOSE BLD STRIP.AUTO-MCNC: 119 MG/DL (ref 65–117)
GLUCOSE BLD STRIP.AUTO-MCNC: 89 MG/DL (ref 65–117)
GLUCOSE SERPL-MCNC: 88 MG/DL (ref 65–100)
HCT VFR BLD AUTO: 26.2 % (ref 35–47)
HGB BLD-MCNC: 8.7 G/DL (ref 11.5–16)
IMM GRANULOCYTES # BLD AUTO: 0.1 K/UL (ref 0–0.04)
IMM GRANULOCYTES NFR BLD AUTO: 1 % (ref 0–0.5)
LYMPHOCYTES # BLD: 2 K/UL (ref 0.8–3.5)
LYMPHOCYTES NFR BLD: 17 % (ref 12–49)
MAGNESIUM SERPL-MCNC: 1.8 MG/DL (ref 1.6–2.4)
MCH RBC QN AUTO: 32.2 PG (ref 26–34)
MCHC RBC AUTO-ENTMCNC: 33.2 G/DL (ref 30–36.5)
MCV RBC AUTO: 97 FL (ref 80–99)
MONOCYTES # BLD: 1.5 K/UL (ref 0–1)
MONOCYTES NFR BLD: 13 % (ref 5–13)
NEUTS SEG # BLD: 8.2 K/UL (ref 1.8–8)
NEUTS SEG NFR BLD: 68 % (ref 32–75)
NRBC # BLD: 0 K/UL (ref 0–0.01)
NRBC BLD-RTO: 0 PER 100 WBC
PHOSPHATE SERPL-MCNC: 3.5 MG/DL (ref 2.6–4.7)
PLATELET # BLD AUTO: 275 K/UL (ref 150–400)
PMV BLD AUTO: 10.7 FL (ref 8.9–12.9)
POTASSIUM SERPL-SCNC: 4.3 MMOL/L (ref 3.5–5.1)
RBC # BLD AUTO: 2.7 M/UL (ref 3.8–5.2)
SERVICE CMNT-IMP: ABNORMAL
SERVICE CMNT-IMP: ABNORMAL
SERVICE CMNT-IMP: NORMAL
SODIUM SERPL-SCNC: 141 MMOL/L (ref 136–145)
WBC # BLD AUTO: 11.9 K/UL (ref 3.6–11)

## 2022-12-11 PROCEDURE — 74011250637 HC RX REV CODE- 250/637: Performed by: HOSPITALIST

## 2022-12-11 PROCEDURE — 83735 ASSAY OF MAGNESIUM: CPT

## 2022-12-11 PROCEDURE — 36415 COLL VENOUS BLD VENIPUNCTURE: CPT

## 2022-12-11 PROCEDURE — 80048 BASIC METABOLIC PNL TOTAL CA: CPT

## 2022-12-11 PROCEDURE — 74011000250 HC RX REV CODE- 250: Performed by: NURSE PRACTITIONER

## 2022-12-11 PROCEDURE — 85025 COMPLETE CBC W/AUTO DIFF WBC: CPT

## 2022-12-11 PROCEDURE — 74011250637 HC RX REV CODE- 250/637: Performed by: STUDENT IN AN ORGANIZED HEALTH CARE EDUCATION/TRAINING PROGRAM

## 2022-12-11 PROCEDURE — 74011000250 HC RX REV CODE- 250: Performed by: INTERNAL MEDICINE

## 2022-12-11 PROCEDURE — 74011000250 HC RX REV CODE- 250: Performed by: STUDENT IN AN ORGANIZED HEALTH CARE EDUCATION/TRAINING PROGRAM

## 2022-12-11 PROCEDURE — 65270000029 HC RM PRIVATE

## 2022-12-11 PROCEDURE — 84100 ASSAY OF PHOSPHORUS: CPT

## 2022-12-11 PROCEDURE — 74011250636 HC RX REV CODE- 250/636: Performed by: STUDENT IN AN ORGANIZED HEALTH CARE EDUCATION/TRAINING PROGRAM

## 2022-12-11 PROCEDURE — 82962 GLUCOSE BLOOD TEST: CPT

## 2022-12-11 PROCEDURE — 74011250637 HC RX REV CODE- 250/637: Performed by: NURSE PRACTITIONER

## 2022-12-11 RX ORDER — NEOMYCIN SULFATE, POLYMYXIN B SULFATE AND GRAMICIDIN 1.75; 10000; .025 MG/ML; [USP'U]/ML; MG/ML
1 SOLUTION/ DROPS OPHTHALMIC 3 TIMES DAILY
Status: DISCONTINUED | OUTPATIENT
Start: 2022-12-11 | End: 2022-12-22 | Stop reason: HOSPADM

## 2022-12-11 RX ADMIN — CEFTRIAXONE SODIUM 1 G: 1 INJECTION, POWDER, FOR SOLUTION INTRAMUSCULAR; INTRAVENOUS at 22:25

## 2022-12-11 RX ADMIN — DIVALPROEX SODIUM 250 MG: 250 TABLET, DELAYED RELEASE ORAL at 16:52

## 2022-12-11 RX ADMIN — NEOMYCIN SULFATE, POLYMYXIN B SULFATE AND GRAMICIDIN 1 DROP: 1.75; 10000; .025 SOLUTION/ DROPS OPHTHALMIC at 22:26

## 2022-12-11 RX ADMIN — LEVOTHYROXINE SODIUM 50 MCG: 0.05 TABLET ORAL at 07:16

## 2022-12-11 RX ADMIN — SODIUM CHLORIDE, PRESERVATIVE FREE 10 ML: 5 INJECTION INTRAVENOUS at 07:16

## 2022-12-11 RX ADMIN — SODIUM CHLORIDE, PRESERVATIVE FREE 10 ML: 5 INJECTION INTRAVENOUS at 16:54

## 2022-12-11 RX ADMIN — GABAPENTIN 1200 MG: 600 TABLET, FILM COATED ORAL at 22:25

## 2022-12-11 RX ADMIN — Medication: at 22:25

## 2022-12-11 RX ADMIN — DIVALPROEX SODIUM 250 MG: 250 TABLET, DELAYED RELEASE ORAL at 22:25

## 2022-12-11 RX ADMIN — Medication: at 07:16

## 2022-12-11 RX ADMIN — POLYETHYLENE GLYCOL 3350 17 G: 17 POWDER, FOR SOLUTION ORAL at 10:29

## 2022-12-11 RX ADMIN — NEOMYCIN SULFATE, POLYMYXIN B SULFATE AND GRAMICIDIN 1 DROP: 1.75; 10000; .025 SOLUTION/ DROPS OPHTHALMIC at 16:52

## 2022-12-11 RX ADMIN — MIDODRINE HYDROCHLORIDE 10 MG: 5 TABLET ORAL at 07:13

## 2022-12-11 RX ADMIN — DIVALPROEX SODIUM 250 MG: 250 TABLET, DELAYED RELEASE ORAL at 10:29

## 2022-12-11 RX ADMIN — NEOMYCIN SULFATE, POLYMYXIN B SULFATE AND GRAMICIDIN 1 DROP: 1.75; 10000; .025 SOLUTION/ DROPS OPHTHALMIC at 10:35

## 2022-12-11 RX ADMIN — GABAPENTIN 1200 MG: 600 TABLET, FILM COATED ORAL at 10:29

## 2022-12-11 RX ADMIN — Medication: at 16:52

## 2022-12-11 RX ADMIN — SODIUM CHLORIDE, PRESERVATIVE FREE 10 ML: 5 INJECTION INTRAVENOUS at 22:27

## 2022-12-11 RX ADMIN — GABAPENTIN 1200 MG: 600 TABLET, FILM COATED ORAL at 16:53

## 2022-12-11 NOTE — PROGRESS NOTES
Ted  care for this patient. Bedside report given to Rod Hedrick RN by [place], RN. Report included SBAR, ED Report, Labs, and Cardiac Rhythm NSR. Patient in bed resting well. Vitals are stable. [Midline] : trachea located in midline position [Normal] : no rashes [de-identified] : no vision right eye, but no nystagmus

## 2022-12-11 NOTE — PROGRESS NOTES
Problem: Pressure Injury - Risk of  Goal: *Prevention of pressure injury  Description: Document Vazquez Scale and appropriate interventions in the flowsheet.   Outcome: Not Progressing Towards Goal  Note: Pressure Injury Interventions:  Sensory Interventions: Assess changes in LOC, Check visual cues for pain    Moisture Interventions: Absorbent underpads, Apply protective barrier, creams and emollients    Activity Interventions: Increase time out of bed, Pressure redistribution bed/mattress(bed type)    Mobility Interventions: Float heels, HOB 30 degrees or less, Pressure redistribution bed/mattress (bed type)    Nutrition Interventions: Document food/fluid/supplement intake, Offer support with meals,snacks and hydration    Friction and Shear Interventions: Apply protective barrier, creams and emollients, HOB 30 degrees or less

## 2022-12-11 NOTE — PROGRESS NOTES
[address] [facility] Adult  Hospitalist Group                                                                                          Hospitalist Progress Note  Holli Washington MD  Answering service: [phone] OR [phone] from in house phone        Date of Service:  2022  NAME:  Tiffanie Coombs  :  1965  MRN:  [id number]      Admission Summary:   Tiffanie Coombs is a 56 y.o. 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 Pt stable. SNF acceptance pending.       Assessment & Plan:     Aspiration pneumonia  --Dysphagia diet per speech, eating well   --s/p cefepime, s/p rocephin (ended 12/10 5 total days)    Hematuria  - Nursing noted hematuria in araujo, araujo removed and straight cath obtained, noted to have large blood in UA but without evidence of infection   - Hemoglobin stable   - Plan for outpatient urology follow-up   - Hold anticoagulation, SCDs in place    Positive blood culture without bacteremia   - 1/2 bottles positive for coag-negative Staph, likely contaminant given no leukocytosis and Biofire positive for Staph but not MRSA or MSSA, suspect staph epi   - Repeat blood cultures pending, thus far no growth to date    Pressure wounds  Wound, Pressure Prevention & Skin Care Recommendations:    Minimize layers of linen/pads under patient to optimize support surface. 2.  Turn/reposition approximately every 2 hours and offload heels. Patient mobility team to assist with q2 turns. 3.  Manage moisture/ Keep skin folds clean and dry/minimize brief usage. 4.  Specialty bed: Prius air mattress ordered from [facility]. 5.  Red erythema to left hip, left foot, right knee and right great toe:  Apply Venelex TID and cover all areas with foam dressings. 6.  Heels:  Protect heels with foam dressing; changed weekly and as needed. Acute on chronic hypotension, resolved  --Currently blood pressure stable patient unable to take midodrine at the setting watch for now    Anemia, stable   -- Anemia of chronic disease, stable   -- Supplement with iron    Psychomotor retardation  Seizure disorder  --Continue home depakote     Hypothyroidism   --Continue levothyroxine    Replete mag - repeat lab    Code status: DNR  DVT prophylaxis: SCDs    Care Plan discussed with: Patient/Family and Nurse  Anticipated Disposition: SNF/LTC  Anticipated Discharge: Greater than 48 hours, pending acceptance to [facility] Problems  Date Reviewed: 11/29/2022            Codes Class Noted POA    Hospital-acquired pneumonia ICD-10-CM: J18.9, Y95  ICD-9-CM: 486  12/6/2022 Unknown             Review of Systems:   Review of systems not obtained due to patient factors. Vital Signs:    Last 24hrs VS reviewed since prior progress note.  Most recent are:  Visit Vitals  /79 (BP 1 Location: Right arm, BP Patient Position: At rest)   Pulse 91   Temp 98.4 °F (36.9 °C)   Resp 17   Ht 5' 4\" (1.626 m)   Wt 49.9 kg (110 lb 1.6 oz)   SpO2 95%   BMI 18.90 kg/m²       No intake or output data in the 24 hours ending 12/11/22 1340         Physical Examination:     I had a face to face encounter with this patient and independently examined them on 12/11/2022 as outlined below:          General : alert x 0, awake, no acute distress, global delays   HEENT: PEERL, EOMI, moist mucus membrane  Neck: supple, no JVD, no meningeal signs  Chest: Clear to auscultation bilaterally   CVS: S1 S2 heard, Capillary refill less than 2 seconds  Abd: soft/ Non tender, colostomy+  Ext: contracted  Neuro/Psych: pleasant mood and affect, contracted      Data Review:    I personally reviewed  Image and labs      Labs:     Recent Labs     12/11/22  0113 12/10/22  0402   WBC 11.9* 7.0   HGB 8.7* 8.5*   HCT 26.2* 26.4*    229     Recent Labs     12/11/22  0113 12/10/22  0402 12/09/22  0231    146* 142   K 4.3 4.3 3.9    113* 110*   CO2 34* 31 28   BUN 21* 18 11   CREA 0.51* 0.46* 0.50*   GLU 88 81 108*   CA 8.5 8.4* 8.1*   MG 1.8 2.0 1.9   PHOS 3.5 3.4 3.9     No results for input(s): ALT, AP, TBIL, TBILI, TP, ALB, GLOB, GGT, AML, LPSE in the last 72 hours. No lab exists for component: SGOT, GPT, AMYP, HLPSE    No results for input(s): INR, PTP, APTT, INREXT, INREXT in the last 72 hours. No results for input(s): FE, TIBC, PSAT, FERR in the last 72 hours. Lab Results   Component Value Date/Time    Folate 6.5 08/29/2022 11:15 AM      No results for input(s): PH, PCO2, PO2 in the last 72 hours. No results for input(s): CPK, CKNDX, TROIQ in the last 72 hours.     No lab exists for component: CPKMB  Lab Results   Component Value Date/Time    Cholesterol, total 208 (H) 02/14/2022 10:30 AM    HDL Cholesterol 91 02/14/2022 10:30 AM    LDL, calculated 96 02/14/2022 10:30 AM    Triglyceride 121 02/14/2022 10:30 AM     Lab Results   Component Value Date/Time    Glucose (POC) 89 12/11/2022 11:33 AM    Glucose (POC) 120 (H) 12/10/2022 04:27 PM    Glucose (POC) 76 12/10/2022 11:27 AM    Glucose (POC) 80 12/10/2022 07:01 AM    Glucose (POC) 100 12/09/2022 05:37 PM     Lab Results   Component Value Date/Time    Color YELLOW/STRAW 12/10/2022 06:02 PM    Appearance CLEAR 12/10/2022 06:02 PM    Specific gravity 1.020 12/10/2022 06:02 PM    Specific gravity 1.030 12/05/2022 09:21 PM    pH (UA) 7.5 12/10/2022 06:02 PM    Protein 30 (A) 12/10/2022 06:02 PM    Glucose Negative 12/10/2022 06:02 PM    Ketone Negative 12/10/2022 06:02 PM    Bilirubin Negative 12/10/2022 06:02 PM    Urobilinogen 0.2 12/10/2022 06:02 PM    Nitrites Negative 12/10/2022 06:02 PM    Leukocyte Esterase TRACE (A) 12/10/2022 06:02 PM    Epithelial cells FEW 12/10/2022 06:02 PM    Bacteria Negative 12/10/2022 06:02 PM    WBC 0-4 12/10/2022 06:02 PM    RBC >100 (H) 12/10/2022 06:02 PM         Medications Reviewed:     Current Facility-Administered Medications   Medication Dose Route Frequency    neomycin-polymyxin-gramicidin (NEOSPORIN) 1.75 mg-10,000 unit-0.025mg/mL ophthalmic drops drop 1 Drop  1 Drop Left Eye TID    cefTRIAXone (ROCEPHIN) 1 g in 0.9% sodium chloride 10 mL IV syringe  1 g IntraVENous Q24H    divalproex DR (DEPAKOTE) tablet 250 mg  250 mg Oral TID    dextrose 10 % infusion 125-250 mL  125-250 mL IntraVENous PRN    gabapentin (NEURONTIN) tablet 1,200 mg  1,200 mg Oral TID    midodrine (PROAMATINE) tablet 10 mg  10 mg Oral ACB&D    balsam peru-castor oiL (VENELEX) ointment   Topical Q8H    sodium chloride (NS) flush 5-40 mL  5-40 mL IntraVENous Q8H    sodium chloride (NS) flush 5-40 mL  5-40 mL IntraVENous PRN    acetaminophen (TYLENOL) tablet 650 mg  650 mg Oral Q6H PRN    Or    acetaminophen (TYLENOL) suppository 650 mg  650 mg Rectal Q6H PRN    polyethylene glycol (MIRALAX) packet 17 g  17 g Oral DAILY PRN    ondansetron (ZOFRAN ODT) tablet 4 mg  4 mg Oral Q8H PRN    Or    ondansetron (ZOFRAN) injection 4 mg  4 mg IntraVENous Q6H PRN    bisacodyL (DULCOLAX) suppository 10 mg  10 mg Rectal DAILY PRN    guaiFENesin-dextromethorphan (ROBITUSSIN DM) 100-10 mg/5 mL syrup 5 mL  5 mL Oral Q6H PRN    diazePAM (VALIUM) tablet 5 mg  5 mg Oral Q6H PRN    hydrOXYzine HCL (ATARAX) tablet 10 mg  10 mg Oral Q6H PRN    levothyroxine (SYNTHROID) tablet 50 mcg  50 mcg Oral ACB    melatonin tablet 3 mg  3 mg Oral QHS PRN    polyethylene glycol (MIRALAX) packet 17 g  17 g Oral DAILY    albuterol-ipratropium (DUO-NEB) 2.5 MG-0.5 MG/3 ML  3 mL Nebulization Q4H PRN     ______________________________________________________________________  EXPECTED LENGTH OF STAY: 3d 0h  ACTUAL LENGTH OF STAY:          5      Please note that this dictation was completed with [employer], the computer voice recognition software. Quite often unanticipated grammatical, syntax, homophones, and other interpretive errors are inadvertently transcribed by the computer software. Please disregard these errors. Please excuse any errors that have escaped final proofreading.                 Duong Gutiérrez MD

## 2022-12-12 LAB
ANION GAP SERPL CALC-SCNC: 3 MMOL/L (ref 5–15)
BASOPHILS # BLD: 0 K/UL (ref 0–0.1)
BASOPHILS NFR BLD: 0 % (ref 0–1)
BUN SERPL-MCNC: 26 MG/DL (ref 6–20)
BUN/CREAT SERPL: 47 (ref 12–20)
CALCIUM SERPL-MCNC: 8.7 MG/DL (ref 8.5–10.1)
CHLORIDE SERPL-SCNC: 103 MMOL/L (ref 97–108)
CO2 SERPL-SCNC: 33 MMOL/L (ref 21–32)
CREAT SERPL-MCNC: 0.55 MG/DL (ref 0.55–1.02)
DIFFERENTIAL METHOD BLD: ABNORMAL
EOSINOPHIL # BLD: 0.1 K/UL (ref 0–0.4)
EOSINOPHIL NFR BLD: 1 % (ref 0–7)
ERYTHROCYTE [DISTWIDTH] IN BLOOD BY AUTOMATED COUNT: 16.6 % (ref 11.5–14.5)
GLUCOSE BLD STRIP.AUTO-MCNC: 100 MG/DL (ref 65–117)
GLUCOSE BLD STRIP.AUTO-MCNC: 82 MG/DL (ref 65–117)
GLUCOSE SERPL-MCNC: 87 MG/DL (ref 65–100)
HCT VFR BLD AUTO: 25.7 % (ref 35–47)
HGB BLD-MCNC: 8.3 G/DL (ref 11.5–16)
IMM GRANULOCYTES # BLD AUTO: 0.1 K/UL (ref 0–0.04)
IMM GRANULOCYTES NFR BLD AUTO: 1 % (ref 0–0.5)
LYMPHOCYTES # BLD: 2.4 K/UL (ref 0.8–3.5)
LYMPHOCYTES NFR BLD: 23 % (ref 12–49)
MAGNESIUM SERPL-MCNC: 2.1 MG/DL (ref 1.6–2.4)
MCH RBC QN AUTO: 32 PG (ref 26–34)
MCHC RBC AUTO-ENTMCNC: 32.3 G/DL (ref 30–36.5)
MCV RBC AUTO: 99.2 FL (ref 80–99)
MONOCYTES # BLD: 1.5 K/UL (ref 0–1)
MONOCYTES NFR BLD: 15 % (ref 5–13)
NEUTS SEG # BLD: 6.2 K/UL (ref 1.8–8)
NEUTS SEG NFR BLD: 60 % (ref 32–75)
NRBC # BLD: 0 K/UL (ref 0–0.01)
NRBC BLD-RTO: 0 PER 100 WBC
PHOSPHATE SERPL-MCNC: 4.2 MG/DL (ref 2.6–4.7)
PLATELET # BLD AUTO: 321 K/UL (ref 150–400)
PMV BLD AUTO: 10.1 FL (ref 8.9–12.9)
POTASSIUM SERPL-SCNC: 4.4 MMOL/L (ref 3.5–5.1)
RBC # BLD AUTO: 2.59 M/UL (ref 3.8–5.2)
SERVICE CMNT-IMP: NORMAL
SERVICE CMNT-IMP: NORMAL
SODIUM SERPL-SCNC: 139 MMOL/L (ref 136–145)
WBC # BLD AUTO: 10.2 K/UL (ref 3.6–11)

## 2022-12-12 PROCEDURE — 82962 GLUCOSE BLOOD TEST: CPT

## 2022-12-12 PROCEDURE — 85025 COMPLETE CBC W/AUTO DIFF WBC: CPT

## 2022-12-12 PROCEDURE — 74011250637 HC RX REV CODE- 250/637: Performed by: NURSE PRACTITIONER

## 2022-12-12 PROCEDURE — 74011250636 HC RX REV CODE- 250/636: Performed by: STUDENT IN AN ORGANIZED HEALTH CARE EDUCATION/TRAINING PROGRAM

## 2022-12-12 PROCEDURE — 74011250637 HC RX REV CODE- 250/637: Performed by: STUDENT IN AN ORGANIZED HEALTH CARE EDUCATION/TRAINING PROGRAM

## 2022-12-12 PROCEDURE — 74011250637 HC RX REV CODE- 250/637: Performed by: HOSPITALIST

## 2022-12-12 PROCEDURE — 36415 COLL VENOUS BLD VENIPUNCTURE: CPT

## 2022-12-12 PROCEDURE — 65270000029 HC RM PRIVATE

## 2022-12-12 PROCEDURE — 80048 BASIC METABOLIC PNL TOTAL CA: CPT

## 2022-12-12 PROCEDURE — 74011000250 HC RX REV CODE- 250: Performed by: STUDENT IN AN ORGANIZED HEALTH CARE EDUCATION/TRAINING PROGRAM

## 2022-12-12 PROCEDURE — 83735 ASSAY OF MAGNESIUM: CPT

## 2022-12-12 PROCEDURE — 74011000250 HC RX REV CODE- 250: Performed by: NURSE PRACTITIONER

## 2022-12-12 PROCEDURE — 84100 ASSAY OF PHOSPHORUS: CPT

## 2022-12-12 PROCEDURE — 97530 THERAPEUTIC ACTIVITIES: CPT

## 2022-12-12 RX ADMIN — Medication: at 16:11

## 2022-12-12 RX ADMIN — DIVALPROEX SODIUM 250 MG: 250 TABLET, DELAYED RELEASE ORAL at 21:16

## 2022-12-12 RX ADMIN — SODIUM CHLORIDE, PRESERVATIVE FREE 10 ML: 5 INJECTION INTRAVENOUS at 16:10

## 2022-12-12 RX ADMIN — SODIUM CHLORIDE, PRESERVATIVE FREE 10 ML: 5 INJECTION INTRAVENOUS at 06:52

## 2022-12-12 RX ADMIN — CEFTRIAXONE SODIUM 1 G: 1 INJECTION, POWDER, FOR SOLUTION INTRAMUSCULAR; INTRAVENOUS at 21:16

## 2022-12-12 RX ADMIN — DIVALPROEX SODIUM 250 MG: 250 TABLET, DELAYED RELEASE ORAL at 16:11

## 2022-12-12 RX ADMIN — NEOMYCIN SULFATE, POLYMYXIN B SULFATE AND GRAMICIDIN 1 DROP: 1.75; 10000; .025 SOLUTION/ DROPS OPHTHALMIC at 09:17

## 2022-12-12 RX ADMIN — Medication: at 06:51

## 2022-12-12 RX ADMIN — MIDODRINE HYDROCHLORIDE 10 MG: 5 TABLET ORAL at 07:29

## 2022-12-12 RX ADMIN — Medication: at 21:15

## 2022-12-12 RX ADMIN — GABAPENTIN 1200 MG: 600 TABLET, FILM COATED ORAL at 21:16

## 2022-12-12 RX ADMIN — POLYETHYLENE GLYCOL 3350 17 G: 17 POWDER, FOR SOLUTION ORAL at 09:12

## 2022-12-12 RX ADMIN — NEOMYCIN SULFATE, POLYMYXIN B SULFATE AND GRAMICIDIN 1 DROP: 1.75; 10000; .025 SOLUTION/ DROPS OPHTHALMIC at 16:11

## 2022-12-12 RX ADMIN — GABAPENTIN 1200 MG: 600 TABLET, FILM COATED ORAL at 09:12

## 2022-12-12 RX ADMIN — SODIUM CHLORIDE, PRESERVATIVE FREE 10 ML: 5 INJECTION INTRAVENOUS at 21:16

## 2022-12-12 RX ADMIN — DIVALPROEX SODIUM 250 MG: 250 TABLET, DELAYED RELEASE ORAL at 09:12

## 2022-12-12 RX ADMIN — MIDODRINE HYDROCHLORIDE 10 MG: 5 TABLET ORAL at 16:11

## 2022-12-12 RX ADMIN — GABAPENTIN 1200 MG: 600 TABLET, FILM COATED ORAL at 16:11

## 2022-12-12 RX ADMIN — LEVOTHYROXINE SODIUM 50 MCG: 0.05 TABLET ORAL at 07:29

## 2022-12-12 RX ADMIN — NEOMYCIN SULFATE, POLYMYXIN B SULFATE AND GRAMICIDIN 1 DROP: 1.75; 10000; .025 SOLUTION/ DROPS OPHTHALMIC at 21:15

## 2022-12-12 NOTE — PROGRESS NOTES
CM received call from [address] [place] [address] ([facility]) staff Anh Webb, phone [phone] who confirmed clinicals received including LTSS. This will be assigned to Level 2 reviewer today. Once evaluation completed it will still take 5-7 days to receive state approval for SNF placement.      Carlos Curiel 52   [phone]

## 2022-12-12 NOTE — PROGRESS NOTES
Problem: Mobility Impaired (Adult and Pediatric)  Goal: *Acute Goals and Plan of Care (Insert Text)  Description: FUNCTIONAL STATUS PRIOR TO ADMISSION: Chart indicates patient is bed bound. Spoke with CM and patient was ambulatory at group home within last year (last time ambulating specifically is unknown). Per CM, father stated that patient was ambulatory at adult group home. HOME SUPPORT PRIOR TO ADMISSION: Recently moved to a new adult group home. Physical Therapy Goals  Initiated 12/9/2022  1. Patient will move from supine to sit and sit to supine , scoot up and down, and roll side to side in bed with minimal assistance within 7 day(s). 2.  Patient will transfer from bed to chair and chair to bed with moderate assistance using the least restrictive device within 7 day(s). 3.  Patient will perform sit to stand with moderate assistance  within 7 day(s). 4.  Patient will ambulate with moderate assistance for 10 feet with the least restrictive device within 7 day(s). Outcome: Not Progressing Towards Goal   PHYSICAL THERAPY TREATMENT  Patient: Cody Sue (46 y.o. female)  Date: 12/12/2022  Diagnosis: Hospital-acquired pneumonia [J18.9, Y95] <principal problem not specified>      Precautions: Fall, Seizure  Chart, physical therapy assessment, plan of care and goals were reviewed. ASSESSMENT  Patient continues with skilled PT services and is not progressing towards goals. Pt received resting in bed, not following commands or responding to questions but did speak on one occasion however unable to understand and pt would not repeat herself. Pt assisted with supine to sit and after therapist initiated transfer pt did participate minimally with supine to sit and sat briefly with occasional assist for balance due to tendency to lean posteriorly. Pt returned to supine position despite cues to remain seated.   She required moderate assistance to lift BLE into bed and then positioned herself in fetal position horizontally across the bed. Pt required total assistance to reposition to [facility] and rolling side to side to replace Chux pads. Pt on one occasion rolled to side lying without assistance but does not follow directions or participate with rolling otherwise. Current Level of Function Impacting Discharge (mobility/balance): bed mobility maximum to total assist x 2    Other factors to consider for discharge: below baseline, does not follow commands to participate with therapy session         PLAN :  Patient continues to benefit from skilled intervention to address the above impairments. Continue treatment per established plan of care. to address goals. Recommendation for discharge: (in order for the patient to meet his/her long term goals)  Therapy up to 5 days/week in SNF setting    This discharge recommendation:  Has not yet been discussed the attending provider and/or case management    IF patient discharges home will need the following DME: to be determined (TBD)       SUBJECTIVE:   Patient mostly non-verbal throughout visit. OBJECTIVE DATA SUMMARY:   Critical Behavior:  Neurologic State: Alert, Confused  Orientation Level: Unable to verbalize  Cognition: Unable to assess (comment)  Safety/Judgement: Decreased awareness of environment  Functional Mobility Training:  Bed Mobility:  Rolling: Total assistance;Assist x1  Supine to Sit: Maximum assistance;Assist x2  Sit to Supine: Moderate assistance;Assist x2                                       Balance:  Sitting: Impaired  Sitting - Static: Fair (occasional)  Sitting - Dynamic: Poor (constant support)  Pain Rating:  No indication of pain    Activity Tolerance:   Poor, only sat briefly on EOB and then attempting to return to bed     After treatment patient left in no apparent distress:   Supine in bed, Call bell within reach, and Side rails x 3    COMMUNICATION/COLLABORATION:   The patients plan of care was discussed with: Registered nurse. July [employer]   Time Calculation: 12 mins

## 2022-12-12 NOTE — PROGRESS NOTES
Bedside shift change report given to Fatuma Hinton RN (oncoming nurse) by [facility], RN (offgoing nurse). Report included the following information SBAR, Kardex, ED Summary, OR Summary, Procedure Summary, Intake/Output, MAR, Recent Results, and Med Rec Status.

## 2022-12-12 NOTE — PROGRESS NOTES
[address] [facility] Adult  Hospitalist Group                                                                                          Hospitalist Progress Note  Stephany Lerma MD  Answering service: [phone] or [phone] from in house phone        Date of Service:  2022  NAME:  Rosibel Cardona  :  1965  MRN:  [id number]      Admission Summary:   Rosibel Cardona is a 56 y.o. 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 with concerns for aspiration pneumonia, patient had an episode of vomiting yesterday, decreased mental status today, had a chest x-ray, concern for multifocal pneumonia, admitted to [facility], became hypotensive, put on Levophed, admitted by ICU team and then transferred to the hospitalist team for further management and evaluation, no further history could be obtained from the patient    Interval history / Subjective:   No acute concerns or complaints. Pt stable. SNF acceptance pending. Assessment & Plan:     Her family and outside  feel that she has regressed from her baseline function and would benefit from SNF. SNF placement has been initiated by case management.      Aspiration pneumonia, resolved   --Dysphagia diet per speech, eating well   --s/p cefepime, s/p rocephin (ended 12/10 5 total days)    Hematuria, stable   Acute on chronic anemia   - Hemoglobin during admission has been stable: ~8-9, however it is notably down from 2 months ago with hemoglobin of ~11 across two checks on  and .   - Nursing noted hematuria in araujo, araujo removed and straight cath obtained, noted to have large blood in UA but without evidence of infection   - Hemoglobin stable over past few days, but is notably down from 2 weeks ago when she was closer to 11 on two separate checks   - Will ask urology to evaluate given trend and no other source of bleeding identified  - Obtain hemoccult   - Hold anticoagulation, SCDs in place    Positive blood culture without bacteremia, resolved   - 1/2 bottles positive for coag-negative Staph, likely contaminant given no leukocytosis and Biofire positive for Staph but not MRSA or MSSA, suspect staph epi   - Repeat blood cultures pending, thus far no growth to date    Pressure wounds, stable   Wound, Pressure Prevention & Skin Care Recommendations:    Minimize layers of linen/pads under patient to optimize support surface. 2.  Turn/reposition approximately every 2 hours and offload heels. Patient mobility team to assist with q2 turns. 3.  Manage moisture/ Keep skin folds clean and dry/minimize brief usage. 4.  Specialty bed: [facility] air mattress ordered from [facility]. 5.  Red erythema to left hip, left foot, right knee and right great toe:  Apply Venelex TID and cover all areas with foam dressings. 6.  Heels:  Protect heels with foam dressing; changed weekly and as needed. Acute on chronic hypotension, resolved  --Currently blood pressure stable patient unable to take midodrine at the setting watch for now    Anemia, stable   -- Anemia of chronic disease, stable   -- Supplement with iron    Psychomotor retardation  Seizure disorder  --Continue home depakote     Hypothyroidism   --Continue levothyroxine    Replete mag - repeat lab    Code status: DNR  DVT prophylaxis: SCDs    Care Plan discussed with: Patient/Family and Nurse  Anticipated Disposition: SNF/LTC  Anticipated Discharge: Greater than 48 hours, pending acceptance to [facility] Problems  Date Reviewed: 11/29/2022            Codes Class Noted POA    Hospital-acquired pneumonia ICD-10-CM: J18.9, Y95  ICD-9-CM: 486  12/6/2022 Unknown             Review of Systems:   Review of systems not obtained due to patient factors.        Vital Signs:    Last 24hrs VS reviewed since prior progress note. Most recent are:  Visit Vitals  BP 98/63 (BP 1 Location: Right upper arm, BP Patient Position: At rest)   Pulse 87   Temp 97.4 °F (36.3 °C)   Resp 19   Ht 5' 4\" (1.626 m)   Wt 49.9 kg (110 lb)   SpO2 94%   BMI 18.88 kg/m²         Intake/Output Summary (Last 24 hours) at 12/12/2022 1720  Last data filed at 12/12/2022 3599  Gross per 24 hour   Intake --   Output 350 ml   Net -350 ml            Physical Examination:     I had a face to face encounter with this patient and independently examined them on 12/12/2022 as outlined below:          General : alert x 0, awake, no acute distress, global delays   HEENT: PEERL, EOMI, moist mucus membrane  Neck: supple, no JVD, no meningeal signs  Chest: Clear to auscultation bilaterally   CVS: S1 S2 heard, Capillary refill less than 2 seconds  Abd: soft/ Non tender, colostomy+  Ext: contracted  Neuro/Psych: pleasant mood and affect, contracted      Data Review:    I personally reviewed  Image and labs      Labs:     Recent Labs     12/12/22 0331 12/11/22  0113   WBC 10.2 11.9*   HGB 8.3* 8.7*   HCT 25.7* 26.2*    275     Recent Labs     12/12/22  0331 12/11/22  0113 12/10/22  0402    141 146*   K 4.4 4.3 4.3    106 113*   CO2 33* 34* 31   BUN 26* 21* 18   CREA 0.55 0.51* 0.46*   GLU 87 88 81   CA 8.7 8.5 8.4*   MG 2.1 1.8 2.0   PHOS 4.2 3.5 3.4     No results for input(s): ALT, AP, TBIL, TBILI, TP, ALB, GLOB, GGT, AML, LPSE in the last 72 hours. No lab exists for component: SGOT, GPT, AMYP, HLPSE    No results for input(s): INR, PTP, APTT, INREXT, INREXT in the last 72 hours. No results for input(s): FE, TIBC, PSAT, FERR in the last 72 hours. Lab Results   Component Value Date/Time    Folate 6.5 08/29/2022 11:15 AM      No results for input(s): PH, PCO2, PO2 in the last 72 hours. No results for input(s): CPK, CKNDX, TROIQ in the last 72 hours.     No lab exists for component: CPKMB  Lab Results   Component Value Date/Time Cholesterol, total 208 (H) 02/14/2022 10:30 AM    HDL Cholesterol 91 02/14/2022 10:30 AM    LDL, calculated 96 02/14/2022 10:30 AM    Triglyceride 121 02/14/2022 10:30 AM     Lab Results   Component Value Date/Time    Glucose (POC) 100 12/12/2022 11:53 AM    Glucose (POC) 82 12/12/2022 06:21 AM    Glucose (POC) 119 (H) 12/11/2022 04:49 PM    Glucose (POC) 89 12/11/2022 11:33 AM    Glucose (POC) 120 (H) 12/10/2022 04:27 PM     Lab Results   Component Value Date/Time    Color YELLOW/STRAW 12/10/2022 06:02 PM    Appearance CLEAR 12/10/2022 06:02 PM    Specific gravity 1.020 12/10/2022 06:02 PM    Specific gravity 1.030 12/05/2022 09:21 PM    pH (UA) 7.5 12/10/2022 06:02 PM    Protein 30 (A) 12/10/2022 06:02 PM    Glucose Negative 12/10/2022 06:02 PM    Ketone Negative 12/10/2022 06:02 PM    Bilirubin Negative 12/10/2022 06:02 PM    Urobilinogen 0.2 12/10/2022 06:02 PM    Nitrites Negative 12/10/2022 06:02 PM    Leukocyte Esterase TRACE (A) 12/10/2022 06:02 PM    Epithelial cells FEW 12/10/2022 06:02 PM    Bacteria Negative 12/10/2022 06:02 PM    WBC 0-4 12/10/2022 06:02 PM    RBC >100 (H) 12/10/2022 06:02 PM         Medications Reviewed:     Current Facility-Administered Medications   Medication Dose Route Frequency    neomycin-polymyxin-gramicidin (NEOSPORIN) 1.75 mg-10,000 unit-0.025mg/mL ophthalmic drops drop 1 Drop  1 Drop Left Eye TID    cefTRIAXone (ROCEPHIN) 1 g in 0.9% sodium chloride 10 mL IV syringe  1 g IntraVENous Q24H    divalproex DR (DEPAKOTE) tablet 250 mg  250 mg Oral TID    dextrose 10 % infusion 125-250 mL  125-250 mL IntraVENous PRN    gabapentin (NEURONTIN) tablet 1,200 mg  1,200 mg Oral TID    midodrine (PROAMATINE) tablet 10 mg  10 mg Oral ACB&D    balsam peru-castor oiL (VENELEX) ointment   Topical Q8H    sodium chloride (NS) flush 5-40 mL  5-40 mL IntraVENous Q8H    sodium chloride (NS) flush 5-40 mL  5-40 mL IntraVENous PRN    acetaminophen (TYLENOL) tablet 650 mg  650 mg Oral Q6H PRN    Or acetaminophen (TYLENOL) suppository 650 mg  650 mg Rectal Q6H PRN    polyethylene glycol (MIRALAX) packet 17 g  17 g Oral DAILY PRN    ondansetron (ZOFRAN ODT) tablet 4 mg  4 mg Oral Q8H PRN    Or    ondansetron (ZOFRAN) injection 4 mg  4 mg IntraVENous Q6H PRN    bisacodyL (DULCOLAX) suppository 10 mg  10 mg Rectal DAILY PRN    guaiFENesin-dextromethorphan (ROBITUSSIN DM) 100-10 mg/5 mL syrup 5 mL  5 mL Oral Q6H PRN    diazePAM (VALIUM) tablet 5 mg  5 mg Oral Q6H PRN    hydrOXYzine HCL (ATARAX) tablet 10 mg  10 mg Oral Q6H PRN    levothyroxine (SYNTHROID) tablet 50 mcg  50 mcg Oral ACB    melatonin tablet 3 mg  3 mg Oral QHS PRN    polyethylene glycol (MIRALAX) packet 17 g  17 g Oral DAILY    albuterol-ipratropium (DUO-NEB) 2.5 MG-0.5 MG/3 ML  3 mL Nebulization Q4H PRN     ______________________________________________________________________  EXPECTED LENGTH OF STAY: 3d 0h  ACTUAL LENGTH OF STAY:          6      Please note that this dictation was completed with [employer], the computer voice recognition software. Quite often unanticipated grammatical, syntax, homophones, and other interpretive errors are inadvertently transcribed by the computer software. Please disregard these errors. Please excuse any errors that have escaped final proofreading.                 Hali Shields MD

## 2022-12-12 NOTE — PROGRESS NOTES
OH:  1. RUR-18%  2. Admitted from [address] [place] [address]. 3. SNF referrals pending. 4. BLS transport. 5. Level 2 submitted today, 12/12. NICOLE spoke with Lead supervisor at [facility], [place] [phone]. She said patient came from previous group with mentioning of able to care for herself, and ambulatory. When patient arrived to their home, patient was found to be needing a wheelchair and not able to care for herself. Nicole spoke with , Kellen Conrad [phone]. She requested patient go to SNF and then she can return to group [facility] if appropriate. Referrals were sent over the weekend to [address] [place] [address], [facility], and [facility]). She will need Level 2 required, process started today by NICOLE supervisor, and patient will need BLS transport at time of discharge. Patient father, Den Nelson [phone] in agreement with this plan. MD aware through Perfect [employer] message, agreed with plan.       [facility]

## 2022-12-13 LAB
ANION GAP SERPL CALC-SCNC: 4 MMOL/L (ref 5–15)
BASOPHILS # BLD: 0.1 K/UL (ref 0–0.1)
BASOPHILS NFR BLD: 1 % (ref 0–1)
BUN SERPL-MCNC: 30 MG/DL (ref 6–20)
BUN/CREAT SERPL: 59 (ref 12–20)
CALCIUM SERPL-MCNC: 9.1 MG/DL (ref 8.5–10.1)
CHLORIDE SERPL-SCNC: 104 MMOL/L (ref 97–108)
CO2 SERPL-SCNC: 31 MMOL/L (ref 21–32)
CREAT SERPL-MCNC: 0.51 MG/DL (ref 0.55–1.02)
DIFFERENTIAL METHOD BLD: ABNORMAL
EOSINOPHIL # BLD: 0.2 K/UL (ref 0–0.4)
EOSINOPHIL NFR BLD: 2 % (ref 0–7)
ERYTHROCYTE [DISTWIDTH] IN BLOOD BY AUTOMATED COUNT: 16.2 % (ref 11.5–14.5)
GLUCOSE SERPL-MCNC: 91 MG/DL (ref 65–100)
HCT VFR BLD AUTO: 26.8 % (ref 35–47)
HGB BLD-MCNC: 8.5 G/DL (ref 11.5–16)
IMM GRANULOCYTES # BLD AUTO: 0.1 K/UL (ref 0–0.04)
IMM GRANULOCYTES NFR BLD AUTO: 1 % (ref 0–0.5)
LYMPHOCYTES # BLD: 2.2 K/UL (ref 0.8–3.5)
LYMPHOCYTES NFR BLD: 22 % (ref 12–49)
MAGNESIUM SERPL-MCNC: 2 MG/DL (ref 1.6–2.4)
MCH RBC QN AUTO: 32.7 PG (ref 26–34)
MCHC RBC AUTO-ENTMCNC: 31.7 G/DL (ref 30–36.5)
MCV RBC AUTO: 103.1 FL (ref 80–99)
MONOCYTES # BLD: 1.3 K/UL (ref 0–1)
MONOCYTES NFR BLD: 13 % (ref 5–13)
NEUTS SEG # BLD: 6.2 K/UL (ref 1.8–8)
NEUTS SEG NFR BLD: 61 % (ref 32–75)
NRBC # BLD: 0 K/UL (ref 0–0.01)
NRBC BLD-RTO: 0 PER 100 WBC
PHOSPHATE SERPL-MCNC: 3.7 MG/DL (ref 2.6–4.7)
PLATELET # BLD AUTO: 429 K/UL (ref 150–400)
PMV BLD AUTO: 9.9 FL (ref 8.9–12.9)
POTASSIUM SERPL-SCNC: 4.1 MMOL/L (ref 3.5–5.1)
RBC # BLD AUTO: 2.6 M/UL (ref 3.8–5.2)
SODIUM SERPL-SCNC: 139 MMOL/L (ref 136–145)
WBC # BLD AUTO: 10 K/UL (ref 3.6–11)

## 2022-12-13 PROCEDURE — 74011000250 HC RX REV CODE- 250: Performed by: NURSE PRACTITIONER

## 2022-12-13 PROCEDURE — 65270000029 HC RM PRIVATE

## 2022-12-13 PROCEDURE — 85025 COMPLETE CBC W/AUTO DIFF WBC: CPT

## 2022-12-13 PROCEDURE — 83735 ASSAY OF MAGNESIUM: CPT

## 2022-12-13 PROCEDURE — 84100 ASSAY OF PHOSPHORUS: CPT

## 2022-12-13 PROCEDURE — 74011250637 HC RX REV CODE- 250/637: Performed by: STUDENT IN AN ORGANIZED HEALTH CARE EDUCATION/TRAINING PROGRAM

## 2022-12-13 PROCEDURE — 36415 COLL VENOUS BLD VENIPUNCTURE: CPT

## 2022-12-13 PROCEDURE — 80048 BASIC METABOLIC PNL TOTAL CA: CPT

## 2022-12-13 PROCEDURE — 74011250637 HC RX REV CODE- 250/637: Performed by: NURSE PRACTITIONER

## 2022-12-13 PROCEDURE — 74011250637 HC RX REV CODE- 250/637: Performed by: HOSPITALIST

## 2022-12-13 RX ADMIN — Medication: at 17:04

## 2022-12-13 RX ADMIN — Medication: at 23:02

## 2022-12-13 RX ADMIN — MIDODRINE HYDROCHLORIDE 10 MG: 5 TABLET ORAL at 17:04

## 2022-12-13 RX ADMIN — SODIUM CHLORIDE, PRESERVATIVE FREE 10 ML: 5 INJECTION INTRAVENOUS at 23:03

## 2022-12-13 RX ADMIN — GABAPENTIN 1200 MG: 600 TABLET, FILM COATED ORAL at 23:02

## 2022-12-13 RX ADMIN — GABAPENTIN 1200 MG: 600 TABLET, FILM COATED ORAL at 08:14

## 2022-12-13 RX ADMIN — SODIUM CHLORIDE, PRESERVATIVE FREE 10 ML: 5 INJECTION INTRAVENOUS at 07:01

## 2022-12-13 RX ADMIN — POLYETHYLENE GLYCOL 3350 17 G: 17 POWDER, FOR SOLUTION ORAL at 08:14

## 2022-12-13 RX ADMIN — GABAPENTIN 1200 MG: 600 TABLET, FILM COATED ORAL at 17:04

## 2022-12-13 RX ADMIN — DIVALPROEX SODIUM 250 MG: 250 TABLET, DELAYED RELEASE ORAL at 17:04

## 2022-12-13 RX ADMIN — DIVALPROEX SODIUM 250 MG: 250 TABLET, DELAYED RELEASE ORAL at 08:14

## 2022-12-13 RX ADMIN — MIDODRINE HYDROCHLORIDE 10 MG: 5 TABLET ORAL at 07:01

## 2022-12-13 RX ADMIN — Medication: at 07:01

## 2022-12-13 RX ADMIN — NEOMYCIN SULFATE, POLYMYXIN B SULFATE AND GRAMICIDIN 1 DROP: 1.75; 10000; .025 SOLUTION/ DROPS OPHTHALMIC at 17:04

## 2022-12-13 RX ADMIN — LEVOTHYROXINE SODIUM 50 MCG: 0.05 TABLET ORAL at 07:01

## 2022-12-13 RX ADMIN — DIVALPROEX SODIUM 250 MG: 250 TABLET, DELAYED RELEASE ORAL at 23:02

## 2022-12-13 RX ADMIN — NEOMYCIN SULFATE, POLYMYXIN B SULFATE AND GRAMICIDIN 1 DROP: 1.75; 10000; .025 SOLUTION/ DROPS OPHTHALMIC at 08:14

## 2022-12-13 RX ADMIN — NEOMYCIN SULFATE, POLYMYXIN B SULFATE AND GRAMICIDIN 1 DROP: 1.75; 10000; .025 SOLUTION/ DROPS OPHTHALMIC at 23:03

## 2022-12-13 NOTE — PROGRESS NOTES
[id number] [facility] Adult  Hospitalist Group                                                                                          Hospitalist Progress Note  Ana Gabriel MD  Answering service: [phone] or [phone] from in house phone        Date of Service:  2022  NAME:  Miki Villareal  :  1965  MRN:  [id number]      Admission Summary:   Miki Villareal is a 56 y.o. 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, hypothyroidism, chronic hypotension on midodrine, bedbound nonverbal came to the ER from a group home to [facility] with concerns for aspiration pneumonia, patient had an episode of vomiting yesterday, decreased mental status today, had a chest x-ray, concern for multifocal pneumonia, admitted to [facility], became hypotensive, put on Levophed, admitted by ICU team and then transferred to the hospitalist team for further management and evaluation, no further history could be obtained from the patient    Interval history / Subjective:   No acute concerns or complaints. Pt stable. SNF acceptance pending. Assessment & Plan:     Her family and outside  feel that she has regressed from her baseline function and would benefit from SNF. SNF placement has been initiated by case management.      Aspiration pneumonia, resolved   --Dysphagia diet per speech, eating well   --s/p cefepime, s/p rocephin (ended 12/10 5 total days)    Hematuria, stable   Acute on chronic anemia   - Hemoglobin during admission has been stable: ~8-9, however it is notably down from 2 months ago with hemoglobin of ~11 across two checks on  and .   - Nursing noted hematuria in araujo, araujo removed and straight cath obtained, noted to have large blood in UA but without evidence of infection   - Hemoglobin stable over past few days, but is notably down from 2 weeks ago when she was closer to 11 on two separate checks   - Urology consulted, appreciate recommendations  - Obtain hemoccult   - Hold anticoagulation, SCDs in place    Positive blood culture without bacteremia, resolved   - 1/2 bottles positive for coag-negative Staph, likely contaminant given no leukocytosis and Biofire positive for Staph but not MRSA or MSSA, suspect staph epi   - Repeat blood cultures pending, thus far no growth to date    Pressure wounds, stable   Wound, Pressure Prevention & Skin Care Recommendations:    Minimize layers of linen/pads under patient to optimize support surface. 2.  Turn/reposition approximately every 2 hours and offload heels. Patient mobility team to assist with q2 turns. 3.  Manage moisture/ Keep skin folds clean and dry/minimize brief usage. 4.  Specialty bed: [facility] air mattress ordered from [facility]. 5.  Red erythema to left hip, left foot, right knee and right great toe:  Apply Venelex TID and cover all areas with foam dressings. 6.  Heels:  Protect heels with foam dressing; changed weekly and as needed. Acute on chronic hypotension, resolved  --Continue home midodrine    Anemia, stable   -- Anemia of chronic disease, stable   -- Supplement with iron    Psychomotor retardation  Seizure disorder  --Continue home depakote     Hypothyroidism   --Continue levothyroxine    Replete mag - repeat lab    Code status: DNR  DVT prophylaxis: SCDs    Care Plan discussed with: Patient/Family and Nurse  Anticipated Disposition: SNF/LTC  Anticipated Discharge: Greater than 48 hours, pending acceptance to [facility] Problems  Date Reviewed: 11/29/2022            Codes Class Noted POA    Hospital-acquired pneumonia ICD-10-CM: J18.9, Y95  ICD-9-CM: 486  12/6/2022 Unknown             Review of Systems:   Review of systems not obtained due to patient factors. Vital Signs:    Last 24hrs VS reviewed since prior progress note.  Most recent are:  Visit Vitals  BP (!) 93/57 (BP 1 Location: Right upper arm, BP Patient Position: At rest)   Pulse 75   Temp 97.4 °F (36.3 °C)   Resp 16   Ht 5' 4\" (1.626 m)   Wt 49.9 kg (110 lb)   SpO2 93%   BMI 18.88 kg/m²         Intake/Output Summary (Last 24 hours) at 12/13/2022 1717  Last data filed at 12/13/2022 1559  Gross per 24 hour   Intake 20 ml   Output 600 ml   Net -580 ml            Physical Examination:     I had a face to face encounter with this patient and independently examined them on 12/13/2022 as outlined below:          General : alert x 0, awake, no acute distress, global delays   HEENT: PEERL, EOMI, moist mucus membrane  Neck: supple, no JVD, no meningeal signs  Chest: Clear to auscultation bilaterally   CVS: S1 S2 heard, Capillary refill less than 2 seconds  Abd: soft/ Non tender, colostomy+  Ext: contracted  Neuro/Psych: pleasant mood and affect, contracted      Data Review:    I personally reviewed  Image and labs      Labs:     Recent Labs     12/13/22 0238 12/12/22 0331   WBC 10.0 10.2   HGB 8.5* 8.3*   HCT 26.8* 25.7*   * 321     Recent Labs     12/13/22 0238 12/13/22 0233 12/12/22 0331 12/11/22  0113     --  139 141   K 4.1  --  4.4 4.3     --  103 106   CO2 31  --  33* 34*   BUN 30*  --  26* 21*   CREA 0.51*  --  0.55 0.51*   GLU 91  --  87 88   CA 9.1  --  8.7 8.5   MG  --  2.0 2.1 1.8   PHOS  --  3.7 4.2 3.5     No results for input(s): ALT, AP, TBIL, TBILI, TP, ALB, GLOB, GGT, AML, LPSE in the last 72 hours. No lab exists for component: SGOT, GPT, AMYP, HLPSE    No results for input(s): INR, PTP, APTT, INREXT, INREXT in the last 72 hours. No results for input(s): FE, TIBC, PSAT, FERR in the last 72 hours. Lab Results   Component Value Date/Time    Folate 6.5 08/29/2022 11:15 AM      No results for input(s): PH, PCO2, PO2 in the last 72 hours. No results for input(s): CPK, CKNDX, TROIQ in the last 72 hours.     No lab exists for component: CPKMB  Lab Results   Component Value Date/Time    Cholesterol, total 208 (H) 02/14/2022 10:30 AM    HDL Cholesterol 91 02/14/2022 10:30 AM    LDL, calculated 96 02/14/2022 10:30 AM    Triglyceride 121 02/14/2022 10:30 AM     Lab Results   Component Value Date/Time    Glucose (POC) 100 12/12/2022 11:53 AM    Glucose (POC) 82 12/12/2022 06:21 AM    Glucose (POC) 119 (H) 12/11/2022 04:49 PM    Glucose (POC) 89 12/11/2022 11:33 AM    Glucose (POC) 120 (H) 12/10/2022 04:27 PM     Lab Results   Component Value Date/Time    Color YELLOW/STRAW 12/10/2022 06:02 PM    Appearance CLEAR 12/10/2022 06:02 PM    Specific gravity 1.020 12/10/2022 06:02 PM    Specific gravity 1.030 12/05/2022 09:21 PM    pH (UA) 7.5 12/10/2022 06:02 PM    Protein 30 (A) 12/10/2022 06:02 PM    Glucose Negative 12/10/2022 06:02 PM    Ketone Negative 12/10/2022 06:02 PM    Bilirubin Negative 12/10/2022 06:02 PM    Urobilinogen 0.2 12/10/2022 06:02 PM    Nitrites Negative 12/10/2022 06:02 PM    Leukocyte Esterase TRACE (A) 12/10/2022 06:02 PM    Epithelial cells FEW 12/10/2022 06:02 PM    Bacteria Negative 12/10/2022 06:02 PM    WBC 0-4 12/10/2022 06:02 PM    RBC >100 (H) 12/10/2022 06:02 PM         Medications Reviewed:     Current Facility-Administered Medications   Medication Dose Route Frequency    neomycin-polymyxin-gramicidin (NEOSPORIN) 1.75 mg-10,000 unit-0.025mg/mL ophthalmic drops drop 1 Drop  1 Drop Left Eye TID    divalproex DR (DEPAKOTE) tablet 250 mg  250 mg Oral TID    dextrose 10 % infusion 125-250 mL  125-250 mL IntraVENous PRN    gabapentin (NEURONTIN) tablet 1,200 mg  1,200 mg Oral TID    midodrine (PROAMATINE) tablet 10 mg  10 mg Oral ACB&D    balsam peru-castor oiL (VENELEX) ointment   Topical Q8H    sodium chloride (NS) flush 5-40 mL  5-40 mL IntraVENous Q8H    sodium chloride (NS) flush 5-40 mL  5-40 mL IntraVENous PRN    acetaminophen (TYLENOL) tablet 650 mg  650 mg Oral Q6H PRN    Or    acetaminophen (TYLENOL) suppository 650 mg  650 mg Rectal Q6H PRN    polyethylene glycol (MIRALAX) packet 17 g  17 g Oral DAILY PRN    ondansetron (ZOFRAN ODT) tablet 4 mg  4 mg Oral Q8H PRN    Or    ondansetron (ZOFRAN) injection 4 mg  4 mg IntraVENous Q6H PRN    bisacodyL (DULCOLAX) suppository 10 mg  10 mg Rectal DAILY PRN    guaiFENesin-dextromethorphan (ROBITUSSIN DM) 100-10 mg/5 mL syrup 5 mL  5 mL Oral Q6H PRN    diazePAM (VALIUM) tablet 5 mg  5 mg Oral Q6H PRN    hydrOXYzine HCL (ATARAX) tablet 10 mg  10 mg Oral Q6H PRN    levothyroxine (SYNTHROID) tablet 50 mcg  50 mcg Oral ACB    melatonin tablet 3 mg  3 mg Oral QHS PRN    polyethylene glycol (MIRALAX) packet 17 g  17 g Oral DAILY    albuterol-ipratropium (DUO-NEB) 2.5 MG-0.5 MG/3 ML  3 mL Nebulization Q4H PRN     ______________________________________________________________________  EXPECTED LENGTH OF STAY: 3d 0h  ACTUAL LENGTH OF STAY:          7      Please note that this dictation was completed with [employer], the computer voice recognition software. Quite often unanticipated grammatical, syntax, homophones, and other interpretive errors are inadvertently transcribed by the computer software. Please disregard these errors. Please excuse any errors that have escaped final proofreading.                 Melita Sams MD

## 2022-12-13 NOTE — PROGRESS NOTES
Problem: Falls - Risk of  Goal: *Absence of Falls  Description: Document Monserrat Velez Fall Risk and appropriate interventions in the flowsheet. Outcome: Progressing Towards Goal  Note: Fall Risk Interventions:       Mentation Interventions: Adequate sleep, hydration, pain control, Bed/chair exit alarm, Door open when patient unattended, Evaluate medications/consider consulting pharmacy, Increase mobility, More frequent rounding, Reorient patient, Room close to nurse's station, Self-releasing belt, Toileting rounds, Update white board    Medication Interventions: Assess postural VS orthostatic hypotension, Bed/chair exit alarm, Evaluate medications/consider consulting pharmacy, Patient to call before getting OOB, Teach patient to arise slowly    Elimination Interventions: Bed/chair exit alarm, Call light in reach, Elevated toilet seat, Patient to call for help with toileting needs, Toilet paper/wipes in reach, Toileting schedule/hourly rounds              Problem: Patient Education: Go to Patient Education Activity  Goal: Patient/Family Education  Outcome: Progressing Towards Goal     Problem: Hypotension  Goal: *Blood pressure within specified parameters  Outcome: Progressing Towards Goal  Goal: *Fluid volume balance  Outcome: Progressing Towards Goal  Goal: *Labs within defined limits  Outcome: Progressing Towards Goal     Problem: Patient Education: Go to Patient Education Activity  Goal: Patient/Family Education  Outcome: Progressing Towards Goal     Problem: Pressure Injury - Risk of  Goal: *Prevention of pressure injury  Description: Document Vazquez Scale and appropriate interventions in the flowsheet.   Outcome: Progressing Towards Goal  Note: Pressure Injury Interventions:  Sensory Interventions: Assess changes in LOC, Avoid rigorous massage over bony prominences, Chair cushion, Check visual cues for pain, Discuss PT/OT consult with provider, Float heels, Keep linens dry and wrinkle-free, Maintain/enhance activity level, Minimize linen layers, Pressure redistribution bed/mattress (bed type), Turn and reposition approx. every two hours (pillows and wedges if needed)    Moisture Interventions: Absorbent underpads, Apply protective barrier, creams and emollients, Assess need for specialty bed, Check for incontinence Q2 hours and as needed, Internal/External fecal devices, Limit adult briefs, Maintain skin hydration (lotion/cream), Minimize layers, Moisture barrier    Activity Interventions: Assess need for specialty bed, Chair cushion, Increase time out of bed, Pressure redistribution bed/mattress(bed type), PT/OT evaluation    Mobility Interventions: Assess need for specialty bed, Chair cushion, Float heels, HOB 30 degrees or less, Pressure redistribution bed/mattress (bed type), PT/OT evaluation, Turn and reposition approx.  every two hours(pillow and wedges)    Nutrition Interventions: Document food/fluid/supplement intake, Discuss nutritional consult with provider, Offer support with meals,snacks and hydration    Friction and Shear Interventions: Apply protective barrier, creams and emollients, Foam dressings/transparent film/skin sealants, Lift sheet, Minimize layers, Transfer aides:transfer board/Irene lift/ceiling lift                Problem: Patient Education: Go to Patient Education Activity  Goal: Patient/Family Education  Outcome: Progressing Towards Goal     Problem: Patient Education: Go to Patient Education Activity  Goal: Patient/Family Education  Outcome: Progressing Towards Goal

## 2022-12-14 LAB
ANION GAP SERPL CALC-SCNC: 4 MMOL/L (ref 5–15)
BACTERIA SPEC CULT: NORMAL
BASOPHILS # BLD: 0 K/UL (ref 0–0.1)
BASOPHILS NFR BLD: 1 % (ref 0–1)
BUN SERPL-MCNC: 36 MG/DL (ref 6–20)
BUN/CREAT SERPL: 72 (ref 12–20)
CALCIUM SERPL-MCNC: 9 MG/DL (ref 8.5–10.1)
CHLORIDE SERPL-SCNC: 106 MMOL/L (ref 97–108)
CO2 SERPL-SCNC: 34 MMOL/L (ref 21–32)
CREAT SERPL-MCNC: 0.5 MG/DL (ref 0.55–1.02)
DIFFERENTIAL METHOD BLD: ABNORMAL
EOSINOPHIL # BLD: 0.2 K/UL (ref 0–0.4)
EOSINOPHIL NFR BLD: 3 % (ref 0–7)
ERYTHROCYTE [DISTWIDTH] IN BLOOD BY AUTOMATED COUNT: 15.9 % (ref 11.5–14.5)
GLUCOSE SERPL-MCNC: 72 MG/DL (ref 65–100)
HCT VFR BLD AUTO: 27.8 % (ref 35–47)
HEMOCCULT STL QL: NEGATIVE
HGB BLD-MCNC: 8.5 G/DL (ref 11.5–16)
IMM GRANULOCYTES # BLD AUTO: 0 K/UL (ref 0–0.04)
IMM GRANULOCYTES NFR BLD AUTO: 1 % (ref 0–0.5)
LYMPHOCYTES # BLD: 2.3 K/UL (ref 0.8–3.5)
LYMPHOCYTES NFR BLD: 33 % (ref 12–49)
MAGNESIUM SERPL-MCNC: 2.2 MG/DL (ref 1.6–2.4)
MCH RBC QN AUTO: 32.1 PG (ref 26–34)
MCHC RBC AUTO-ENTMCNC: 30.6 G/DL (ref 30–36.5)
MCV RBC AUTO: 104.9 FL (ref 80–99)
MONOCYTES # BLD: 0.7 K/UL (ref 0–1)
MONOCYTES NFR BLD: 10 % (ref 5–13)
NEUTS SEG # BLD: 3.6 K/UL (ref 1.8–8)
NEUTS SEG NFR BLD: 52 % (ref 32–75)
NRBC # BLD: 0 K/UL (ref 0–0.01)
NRBC BLD-RTO: 0 PER 100 WBC
PHOSPHATE SERPL-MCNC: 3.9 MG/DL (ref 2.6–4.7)
PLATELET # BLD AUTO: 439 K/UL (ref 150–400)
PMV BLD AUTO: 9.6 FL (ref 8.9–12.9)
POTASSIUM SERPL-SCNC: 4.5 MMOL/L (ref 3.5–5.1)
RBC # BLD AUTO: 2.65 M/UL (ref 3.8–5.2)
SERVICE CMNT-IMP: NORMAL
SODIUM SERPL-SCNC: 144 MMOL/L (ref 136–145)
WBC # BLD AUTO: 6.9 K/UL (ref 3.6–11)

## 2022-12-14 PROCEDURE — 74011000250 HC RX REV CODE- 250: Performed by: NURSE PRACTITIONER

## 2022-12-14 PROCEDURE — 83735 ASSAY OF MAGNESIUM: CPT

## 2022-12-14 PROCEDURE — 84100 ASSAY OF PHOSPHORUS: CPT

## 2022-12-14 PROCEDURE — 65270000029 HC RM PRIVATE

## 2022-12-14 PROCEDURE — 74011250637 HC RX REV CODE- 250/637: Performed by: STUDENT IN AN ORGANIZED HEALTH CARE EDUCATION/TRAINING PROGRAM

## 2022-12-14 PROCEDURE — 85025 COMPLETE CBC W/AUTO DIFF WBC: CPT

## 2022-12-14 PROCEDURE — 36415 COLL VENOUS BLD VENIPUNCTURE: CPT

## 2022-12-14 PROCEDURE — 74011250637 HC RX REV CODE- 250/637: Performed by: HOSPITALIST

## 2022-12-14 PROCEDURE — 97530 THERAPEUTIC ACTIVITIES: CPT

## 2022-12-14 PROCEDURE — 77030037877 HC DRSG MEPILEX >48IN BORD MOLN -A

## 2022-12-14 PROCEDURE — 80048 BASIC METABOLIC PNL TOTAL CA: CPT

## 2022-12-14 PROCEDURE — 82272 OCCULT BLD FECES 1-3 TESTS: CPT

## 2022-12-14 PROCEDURE — 74011250637 HC RX REV CODE- 250/637: Performed by: NURSE PRACTITIONER

## 2022-12-14 RX ADMIN — GABAPENTIN 1200 MG: 600 TABLET, FILM COATED ORAL at 17:36

## 2022-12-14 RX ADMIN — MIDODRINE HYDROCHLORIDE 10 MG: 5 TABLET ORAL at 07:00

## 2022-12-14 RX ADMIN — Medication: at 15:27

## 2022-12-14 RX ADMIN — NEOMYCIN SULFATE, POLYMYXIN B SULFATE AND GRAMICIDIN 1 DROP: 1.75; 10000; .025 SOLUTION/ DROPS OPHTHALMIC at 08:44

## 2022-12-14 RX ADMIN — DIVALPROEX SODIUM 250 MG: 250 TABLET, DELAYED RELEASE ORAL at 22:45

## 2022-12-14 RX ADMIN — POLYETHYLENE GLYCOL 3350 17 G: 17 POWDER, FOR SOLUTION ORAL at 08:44

## 2022-12-14 RX ADMIN — SODIUM CHLORIDE, PRESERVATIVE FREE 10 ML: 5 INJECTION INTRAVENOUS at 22:45

## 2022-12-14 RX ADMIN — LEVOTHYROXINE SODIUM 50 MCG: 0.05 TABLET ORAL at 07:00

## 2022-12-14 RX ADMIN — MIDODRINE HYDROCHLORIDE 10 MG: 5 TABLET ORAL at 17:36

## 2022-12-14 RX ADMIN — NEOMYCIN SULFATE, POLYMYXIN B SULFATE AND GRAMICIDIN 1 DROP: 1.75; 10000; .025 SOLUTION/ DROPS OPHTHALMIC at 22:45

## 2022-12-14 RX ADMIN — SODIUM CHLORIDE, PRESERVATIVE FREE 10 ML: 5 INJECTION INTRAVENOUS at 07:00

## 2022-12-14 RX ADMIN — DIVALPROEX SODIUM 250 MG: 250 TABLET, DELAYED RELEASE ORAL at 17:36

## 2022-12-14 RX ADMIN — GABAPENTIN 1200 MG: 600 TABLET, FILM COATED ORAL at 08:44

## 2022-12-14 RX ADMIN — Medication: at 07:00

## 2022-12-14 RX ADMIN — NEOMYCIN SULFATE, POLYMYXIN B SULFATE AND GRAMICIDIN 1 DROP: 1.75; 10000; .025 SOLUTION/ DROPS OPHTHALMIC at 17:38

## 2022-12-14 RX ADMIN — SODIUM CHLORIDE, PRESERVATIVE FREE 10 ML: 5 INJECTION INTRAVENOUS at 15:27

## 2022-12-14 RX ADMIN — GABAPENTIN 1200 MG: 600 TABLET, FILM COATED ORAL at 22:45

## 2022-12-14 RX ADMIN — DIVALPROEX SODIUM 250 MG: 250 TABLET, DELAYED RELEASE ORAL at 08:44

## 2022-12-14 RX ADMIN — Medication: at 22:45

## 2022-12-14 NOTE — PROGRESS NOTES
[facility]                                                                                          Hospitalist Progress Note  Cecil Ulloa MD  Answering service: [phone] OR [phone] from in house phone        Date of Service:  2022  NAME:  Alcides Jaime  :  1965  MRN:  [id number]      Admission Summary:   Alcides Jaime is a 56 y.o. 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, hypothyroidism, chronic hypotension on midodrine, bedbound nonverbal came to the ER from a group home to [facility] with concerns for aspiration pneumonia, patient had an episode of vomiting yesterday, decreased mental status today, had a chest x-ray, concern for multifocal pneumonia, admitted to [facility], became hypotensive, put on Levophed, admitted by ICU team and then transferred to the hospitalist team for further management and evaluation, no further history could be obtained from the patient    Interval history / Subjective:   Patient is nonverbal, does not express pain or negative emotions. Pt stable. SNF acceptance pending. Medically ready. Assessment & Plan:     Her family and outside  feel that she has regressed from her baseline function and would benefit from SNF. SNF placement has been initiated by case management.      Aspiration pneumonia, resolved   --Dysphagia diet per speech, eating well   --s/p cefepime, s/p rocephin (ended 12/10, 5 total days)    Hematuria, stable   Acute on chronic anemia   - Hemoglobin during admission has been stable: ~8-9, however it is notably down from 2 months ago with hemoglobin of ~11 across two checks on  and .   - Nursing noted hematuria in araujo, araujo removed and straight cath obtained, noted to have large blood in UA but without evidence of infection   - Hemoglobin stable over past few days, but is notably down from 2 weeks ago when she was closer to 11 on two separate checks   - Urology consulted, unlikely to be 2/2 bleeding from traumatic araujo insertion   - Obtain hemoccult, ordered  - Hold anticoagulation, SCDs in place    Positive blood culture without bacteremia, resolved   - 1/2 bottles positive for coag-negative Staph, likely contaminant given no leukocytosis and Biofire positive for Staph but not MRSA or MSSA, suspect staph epi   - Repeat blood cultures pending, thus far no growth to date    Pressure wounds, stable   Wound, Pressure Prevention & Skin Care Recommendations:    Minimize layers of linen/pads under patient to optimize support surface. 2.  Turn/reposition approximately every 2 hours and offload heels. Patient mobility team to assist with q2 turns. 3.  Manage moisture/ Keep skin folds clean and dry/minimize brief usage. 4.  Specialty bed: [facility] air mattress ordered from [facility]. 5.  Red erythema to left hip, left foot, right knee and right great toe:  Apply Venelex TID and cover all areas with foam dressings. 6.  Left heel:  Every 3 days cleanse with saline; apply Xeroform and foam dressing. 7.  Right heel:  Protect with foam dressing. Acute on chronic hypotension, resolved  --Continue home midodrine    Anemia, stable   -- Anemia of chronic disease, stable   -- Supplement with iron    Psychomotor retardation  Seizure disorder  --Continue home depakote     Hypothyroidism   --Continue levothyroxine      Code status: DNR  DVT prophylaxis: SCDs    Care Plan discussed with: Nursing, CM, Family updated  Anticipated Disposition: SNF/LTC  Anticipated Discharge: Greater than 48 hours, Medically ready, SNF pending     Hospital Problems  Date Reviewed: 11/29/2022            Codes Class Noted POA    Hospital-acquired pneumonia ICD-10-CM: J18.9, Y95  ICD-9-CM: 486  12/6/2022 Unknown             Review of Systems:   Review of systems not obtained due to patient factors.        Vital Signs:    Last 24hrs VS reviewed since prior progress note. Most recent are:  Visit Vitals  BP (!) 129/54 (BP 1 Location: Right arm, BP Patient Position: Sitting; At rest)   Pulse 78   Temp 97.3 °F (36.3 °C)   Resp 17   Ht 5' 4\" (1.626 m)   Wt 49.9 kg (110 lb)   SpO2 100%   BMI 18.88 kg/m²         Intake/Output Summary (Last 24 hours) at 12/14/2022 1556  Last data filed at 12/13/2022 1559  Gross per 24 hour   Intake --   Output 300 ml   Net -300 ml            Physical Examination:     I had a face to face encounter with this patient and independently examined them on 12/14/2022 as outlined below:          General : alert x 0, awake, no acute distress, global delays   HEENT: PEERL, EOMI, moist mucus membrane  Neck: supple, no JVD, no meningeal signs  Chest: Clear to auscultation bilaterally   CVS: S1 S2 heard, Capillary refill less than 2 seconds  Abd: soft/ Non tender, colostomy+  Ext: contracted  Neuro/Psych: pleasant mood and affect, contracted      Data Review:    I personally reviewed  Image and labs      Labs:     Recent Labs     12/14/22 0318 12/13/22 0238   WBC 6.9 10.0   HGB 8.5* 8.5*   HCT 27.8* 26.8*   * 429*     Recent Labs     12/14/22 0318 12/13/22 0238 12/13/22  0233 12/12/22  0331    139  --  139   K 4.5 4.1  --  4.4    104  --  103   CO2 34* 31  --  33*   BUN 36* 30*  --  26*   CREA 0.50* 0.51*  --  0.55   GLU 72 91  --  87   CA 9.0 9.1  --  8.7   MG 2.2  --  2.0 2.1   PHOS 3.9  --  3.7 4.2     No results for input(s): ALT, AP, TBIL, TBILI, TP, ALB, GLOB, GGT, AML, LPSE in the last 72 hours. No lab exists for component: SGOT, GPT, AMYP, HLPSE    No results for input(s): INR, PTP, APTT, INREXT, INREXT in the last 72 hours. No results for input(s): FE, TIBC, PSAT, FERR in the last 72 hours. Lab Results   Component Value Date/Time    Folate 6.5 08/29/2022 11:15 AM      No results for input(s): PH, PCO2, PO2 in the last 72 hours.     No results for input(s): CPK, CKNDX, TROIQ in the last 72 hours.    No lab exists for component: CPKMB  Lab Results   Component Value Date/Time    Cholesterol, total 208 (H) 02/14/2022 10:30 AM    HDL Cholesterol 91 02/14/2022 10:30 AM    LDL, calculated 96 02/14/2022 10:30 AM    Triglyceride 121 02/14/2022 10:30 AM     Lab Results   Component Value Date/Time    Glucose (POC) 100 12/12/2022 11:53 AM    Glucose (POC) 82 12/12/2022 06:21 AM    Glucose (POC) 119 (H) 12/11/2022 04:49 PM    Glucose (POC) 89 12/11/2022 11:33 AM    Glucose (POC) 120 (H) 12/10/2022 04:27 PM     Lab Results   Component Value Date/Time    Color YELLOW/STRAW 12/10/2022 06:02 PM    Appearance CLEAR 12/10/2022 06:02 PM    Specific gravity 1.020 12/10/2022 06:02 PM    Specific gravity 1.030 12/05/2022 09:21 PM    pH (UA) 7.5 12/10/2022 06:02 PM    Protein 30 (A) 12/10/2022 06:02 PM    Glucose Negative 12/10/2022 06:02 PM    Ketone Negative 12/10/2022 06:02 PM    Bilirubin Negative 12/10/2022 06:02 PM    Urobilinogen 0.2 12/10/2022 06:02 PM    Nitrites Negative 12/10/2022 06:02 PM    Leukocyte Esterase TRACE (A) 12/10/2022 06:02 PM    Epithelial cells FEW 12/10/2022 06:02 PM    Bacteria Negative 12/10/2022 06:02 PM    WBC 0-4 12/10/2022 06:02 PM    RBC >100 (H) 12/10/2022 06:02 PM         Medications Reviewed:     Current Facility-Administered Medications   Medication Dose Route Frequency    neomycin-polymyxin-gramicidin (NEOSPORIN) 1.75 mg-10,000 unit-0.025mg/mL ophthalmic drops drop 1 Drop  1 Drop Left Eye TID    divalproex DR (DEPAKOTE) tablet 250 mg  250 mg Oral TID    dextrose 10 % infusion 125-250 mL  125-250 mL IntraVENous PRN    gabapentin (NEURONTIN) tablet 1,200 mg  1,200 mg Oral TID    midodrine (PROAMATINE) tablet 10 mg  10 mg Oral ACB&D    balsam peru-castor oiL (VENELEX) ointment   Topical Q8H    sodium chloride (NS) flush 5-40 mL  5-40 mL IntraVENous Q8H    sodium chloride (NS) flush 5-40 mL  5-40 mL IntraVENous PRN    acetaminophen (TYLENOL) tablet 650 mg  650 mg Oral Q6H PRN    Or acetaminophen (TYLENOL) suppository 650 mg  650 mg Rectal Q6H PRN    polyethylene glycol (MIRALAX) packet 17 g  17 g Oral DAILY PRN    ondansetron (ZOFRAN ODT) tablet 4 mg  4 mg Oral Q8H PRN    Or    ondansetron (ZOFRAN) injection 4 mg  4 mg IntraVENous Q6H PRN    bisacodyL (DULCOLAX) suppository 10 mg  10 mg Rectal DAILY PRN    guaiFENesin-dextromethorphan (ROBITUSSIN DM) 100-10 mg/5 mL syrup 5 mL  5 mL Oral Q6H PRN    diazePAM (VALIUM) tablet 5 mg  5 mg Oral Q6H PRN    hydrOXYzine HCL (ATARAX) tablet 10 mg  10 mg Oral Q6H PRN    levothyroxine (SYNTHROID) tablet 50 mcg  50 mcg Oral ACB    melatonin tablet 3 mg  3 mg Oral QHS PRN    polyethylene glycol (MIRALAX) packet 17 g  17 g Oral DAILY    albuterol-ipratropium (DUO-NEB) 2.5 MG-0.5 MG/3 ML  3 mL Nebulization Q4H PRN     ______________________________________________________________________  EXPECTED LENGTH OF STAY: 3d 0h  ACTUAL LENGTH OF STAY:          8      Please note that this dictation was completed with [employer], the computer voice recognition software. Quite often unanticipated grammatical, syntax, homophones, and other interpretive errors are inadvertently transcribed by the computer software. Please disregard these errors. Please excuse any errors that have escaped final proofreading.                 Doretha Quiroz MD

## 2022-12-14 NOTE — WOUND CARE
WOCN Note:      Follow up assessment of heels and areas of red erythema POA. Chart reviewed. Assessed in room 625  Admitted DX:  Hospital-acquired pneumonia       Past Medical History:   Diagnosis Date    Constipation      Hypotension      Psychomotor retardation       severe    Seizure disorder ([address] [address]        Assessment:   Patient is alert and patient is repositioning herself in the bed. Found sitting up facing the wall. [facility]     1. POA Left heel, Pressure Injury Stage 2/bullae ruptured with slow blanching pink base; the purple discoloration wiped away with gauze: 3.2 x 5 x 0. cm; cleansed with saline; applied Xeroform and foam dressing. 2. POA Right heel, Pressure Injury Stage 2/fluid filled bullae:  1 x 2 x 0 cm; 100% serous filled. Covered with foam dressing. 3.  POA Left foot, Pressure Injury Stage 1:  resolved to blanching pink. 4.  POA Right foot, Pressure Injury stage 1:  resolved to blanching pink. 5.  POA Left inner knee, Pressure injury Stage 1:  resolved to blanching pink. 6.  POA Right knee, Pressure injury Stage 1:  resolved to blanching pink. 7.  POA Left hip, Pressure Injury Stage 1:  resolved to blanching pink  8. POA Sacrum, blanching pink erythema. Wound, Pressure Prevention & Skin Care Recommendations:    Minimize layers of linen/pads under patient to optimize support surface. 2.  Turn/reposition approximately every 2 hours and offload heels. Patient mobility team to assist with q2 turns. 3.  Manage moisture/ Keep skin folds clean and dry/minimize brief usage. 4.  Specialty bed[facility]. 5.  Red erythema to left hip, left foot, right knee and right great toe:  Apply Venelex TID and cover all areas with foam dressings. 6.  Left heel:  Every 3 days cleanse with saline; apply Xeroform and foam dressing. 7.  Right heel:  Protect with foam dressing. Discussed above plan with Yakov huang RN.      Transition of Care:   Plan to follow as needed while admitted to hospital.     [facility] Purcell, KRISTANN RN [facility] Inpatient Wound Care  Available on [employer]  Office [phone]

## 2022-12-14 NOTE — PROGRESS NOTES
FUNCTIONAL STATUS PRIOR TO ADMISSION: Patient required total assist for basic and instrumental ADLs. However, per nursing, pt was able to walk within the last year. HOME SUPPORT:  Patient lives in a group home. Occupational Therapy Goals  Initiated 12/10/2022  1. Patient will perform self-feeding with moderate assistance  within 7 day(s). 2.  Patient will perform grooming with maximal assistance within 7 day(s). 3.  Patient will follow simple 1 step command with 50% accuracy in prep for ADLs within 7 days  4. Patient will tolerate sitting EOB with MAX A in prep for ADL/transfers within 7 days. Problem: Self Care Deficits Care Plan (Adult)  Goal: *Acute Goals and Plan of Care (Insert Text)  Outcome: Not Progressing Towards Goal   OCCUPATIONAL THERAPY TREATMENT  Patient: Rosalie Leon (68 y.o. female)  Date: 12/14/2022  Diagnosis: Hospital-acquired pneumonia [J18.9, Y95] <principal problem not specified>      Precautions: Fall, Seizure  Chart, occupational therapy assessment, plan of care, and goals were reviewed. ASSESSMENT  Patient continues with skilled OT services and is not progressing towards goals. Patient limited in progress due to limited to no command following, nonverbal, limited overall participation with therapy, and baseline cognitive status. Patient received sitting with legs underneath her at [facility], per nurse aide report patient has been spontaneously sitting up throughout day. Patient's gown soiled so assisted with changing patient into a clean gown with max A to complete. Patient then spontaneously laying back onto bed in fetal position, resistant to repositioning at this time but assisted to scoot towards center of bed so bed alarm could be armed. Attempted to engage patient in simple grooming task to wash face with warm wash cloth but not participating or following commands at this time so session ended.  Patient's frequency changed from 3x/week to 2x/week due to limited command following and participation, can increase if participation improves. Patient would benefit from skilled OT services during admission to improve independence with self care and functional mobility/transfers. Recommend discharge to SNF at this time, per chart review, group home is unable to accommodate patient's needs at this time. Current Level of Function Impacting Discharge (ADLs): SBA to total A for mobility (dependent on volition as patient not following commands), max to total A for self care tasks    Other factors to consider for discharge: prior level of function, from group home, family support         PLAN :  Patient continues to benefit from skilled intervention to address the above impairments. Continue treatment per established plan of care to address goals. Recommend with staff: bedpan for toileting if able to communicate need, frequent checks for incontinence to assist with skin integrity     Recommend next OT session: continue POC    Recommendation for discharge: (in order for the patient to meet his/her long term goals)  Therapy up to 5 days/week in SNF setting    This discharge recommendation:  Has been made in collaboration with the attending provider and/or case management    IF patient discharges home will need the following DME: TBD pending progress       SUBJECTIVE:   Patient is nonverbal.    OBJECTIVE DATA SUMMARY:   Cognitive/Behavioral Status:  Neurologic State: Alert  Orientation Level: Unable to verbalize  Cognition: Impaired decision making             Functional Mobility and Transfers for ADLs:  Bed Mobility:  Sit to Supine: Stand-by assistance (spontaneously)  Scooting: Total assistance    Transfers:             Balance:  Sitting: Intact (in bed)  Sitting - Static: Good (unsupported)    ADL Intervention:       Grooming  Position Performed: Other (comment) (bed level)  Washing Face:  Total assistance (dependent) (attempted to engage but not following commands)         Type of Bath: Basin/Soap/Water;Partial         Upper Body [address]: Maximum assistance (while pt sitting up in bed)                   Therapeutic Exercises:   Not following commands so unable to participate in therapeutic exercise at this time    Pain:  Unable to rate, no pain signs noted    Activity Tolerance:   Poor    After treatment patient left in no apparent distress:   Supine in bed, Call bell within reach, Bed / chair alarm activated, and side rails up with padding along rails in place    COMMUNICATION/COLLABORATION:   The patients plan of care was discussed with: Certified nursing assistant/patient care technician.      Med January, OTR/L  Time Calculation: 8 mins

## 2022-12-15 LAB
ANION GAP SERPL CALC-SCNC: 1 MMOL/L (ref 5–15)
BASOPHILS # BLD: 0 K/UL (ref 0–0.1)
BASOPHILS NFR BLD: 1 % (ref 0–1)
BUN SERPL-MCNC: 46 MG/DL (ref 6–20)
BUN/CREAT SERPL: 92 (ref 12–20)
CALCIUM SERPL-MCNC: 8.9 MG/DL (ref 8.5–10.1)
CHLORIDE SERPL-SCNC: 107 MMOL/L (ref 97–108)
CO2 SERPL-SCNC: 36 MMOL/L (ref 21–32)
CREAT SERPL-MCNC: 0.5 MG/DL (ref 0.55–1.02)
DIFFERENTIAL METHOD BLD: ABNORMAL
EOSINOPHIL # BLD: 0.2 K/UL (ref 0–0.4)
EOSINOPHIL NFR BLD: 2 % (ref 0–7)
ERYTHROCYTE [DISTWIDTH] IN BLOOD BY AUTOMATED COUNT: 15.8 % (ref 11.5–14.5)
GLUCOSE SERPL-MCNC: 76 MG/DL (ref 65–100)
HCT VFR BLD AUTO: 27.9 % (ref 35–47)
HGB BLD-MCNC: 8.6 G/DL (ref 11.5–16)
IMM GRANULOCYTES # BLD AUTO: 0 K/UL (ref 0–0.04)
IMM GRANULOCYTES NFR BLD AUTO: 1 % (ref 0–0.5)
LYMPHOCYTES # BLD: 2.4 K/UL (ref 0.8–3.5)
LYMPHOCYTES NFR BLD: 30 % (ref 12–49)
MAGNESIUM SERPL-MCNC: 2.3 MG/DL (ref 1.6–2.4)
MCH RBC QN AUTO: 32.3 PG (ref 26–34)
MCHC RBC AUTO-ENTMCNC: 30.8 G/DL (ref 30–36.5)
MCV RBC AUTO: 104.9 FL (ref 80–99)
MONOCYTES # BLD: 1 K/UL (ref 0–1)
MONOCYTES NFR BLD: 12 % (ref 5–13)
NEUTS SEG # BLD: 4.4 K/UL (ref 1.8–8)
NEUTS SEG NFR BLD: 54 % (ref 32–75)
NRBC # BLD: 0 K/UL (ref 0–0.01)
NRBC BLD-RTO: 0 PER 100 WBC
PHOSPHATE SERPL-MCNC: 3.9 MG/DL (ref 2.6–4.7)
PLATELET # BLD AUTO: 553 K/UL (ref 150–400)
PMV BLD AUTO: 9.4 FL (ref 8.9–12.9)
POTASSIUM SERPL-SCNC: 4.9 MMOL/L (ref 3.5–5.1)
RBC # BLD AUTO: 2.66 M/UL (ref 3.8–5.2)
SODIUM SERPL-SCNC: 144 MMOL/L (ref 136–145)
WBC # BLD AUTO: 8.1 K/UL (ref 3.6–11)

## 2022-12-15 PROCEDURE — 74011250637 HC RX REV CODE- 250/637: Performed by: NURSE PRACTITIONER

## 2022-12-15 PROCEDURE — 65270000029 HC RM PRIVATE

## 2022-12-15 PROCEDURE — 74011250637 HC RX REV CODE- 250/637: Performed by: STUDENT IN AN ORGANIZED HEALTH CARE EDUCATION/TRAINING PROGRAM

## 2022-12-15 PROCEDURE — 85025 COMPLETE CBC W/AUTO DIFF WBC: CPT

## 2022-12-15 PROCEDURE — 84100 ASSAY OF PHOSPHORUS: CPT

## 2022-12-15 PROCEDURE — 83735 ASSAY OF MAGNESIUM: CPT

## 2022-12-15 PROCEDURE — 74011000250 HC RX REV CODE- 250: Performed by: NURSE PRACTITIONER

## 2022-12-15 PROCEDURE — 36415 COLL VENOUS BLD VENIPUNCTURE: CPT

## 2022-12-15 PROCEDURE — 74011250637 HC RX REV CODE- 250/637: Performed by: HOSPITALIST

## 2022-12-15 PROCEDURE — 80048 BASIC METABOLIC PNL TOTAL CA: CPT

## 2022-12-15 RX ADMIN — Medication: at 21:50

## 2022-12-15 RX ADMIN — DIVALPROEX SODIUM 250 MG: 250 TABLET, DELAYED RELEASE ORAL at 21:51

## 2022-12-15 RX ADMIN — MIDODRINE HYDROCHLORIDE 10 MG: 5 TABLET ORAL at 06:58

## 2022-12-15 RX ADMIN — GABAPENTIN 1200 MG: 600 TABLET, FILM COATED ORAL at 10:23

## 2022-12-15 RX ADMIN — NEOMYCIN SULFATE, POLYMYXIN B SULFATE AND GRAMICIDIN 1 DROP: 1.75; 10000; .025 SOLUTION/ DROPS OPHTHALMIC at 21:50

## 2022-12-15 RX ADMIN — Medication: at 14:28

## 2022-12-15 RX ADMIN — GABAPENTIN 1200 MG: 600 TABLET, FILM COATED ORAL at 21:51

## 2022-12-15 RX ADMIN — DIVALPROEX SODIUM 250 MG: 250 TABLET, DELAYED RELEASE ORAL at 10:23

## 2022-12-15 RX ADMIN — Medication 3 MG: at 21:51

## 2022-12-15 RX ADMIN — NEOMYCIN SULFATE, POLYMYXIN B SULFATE AND GRAMICIDIN 1 DROP: 1.75; 10000; .025 SOLUTION/ DROPS OPHTHALMIC at 18:02

## 2022-12-15 RX ADMIN — Medication: at 06:58

## 2022-12-15 RX ADMIN — POLYETHYLENE GLYCOL 3350 17 G: 17 POWDER, FOR SOLUTION ORAL at 10:23

## 2022-12-15 RX ADMIN — SODIUM CHLORIDE, PRESERVATIVE FREE 10 ML: 5 INJECTION INTRAVENOUS at 21:51

## 2022-12-15 RX ADMIN — SODIUM CHLORIDE, PRESERVATIVE FREE 10 ML: 5 INJECTION INTRAVENOUS at 14:28

## 2022-12-15 RX ADMIN — NEOMYCIN SULFATE, POLYMYXIN B SULFATE AND GRAMICIDIN 1 DROP: 1.75; 10000; .025 SOLUTION/ DROPS OPHTHALMIC at 10:23

## 2022-12-15 RX ADMIN — LEVOTHYROXINE SODIUM 50 MCG: 0.05 TABLET ORAL at 06:58

## 2022-12-15 RX ADMIN — MIDODRINE HYDROCHLORIDE 10 MG: 5 TABLET ORAL at 18:02

## 2022-12-15 RX ADMIN — GABAPENTIN 1200 MG: 600 TABLET, FILM COATED ORAL at 18:02

## 2022-12-15 RX ADMIN — SODIUM CHLORIDE, PRESERVATIVE FREE 10 ML: 5 INJECTION INTRAVENOUS at 06:58

## 2022-12-15 RX ADMIN — DIVALPROEX SODIUM 250 MG: 250 TABLET, DELAYED RELEASE ORAL at 18:02

## 2022-12-15 NOTE — PROGRESS NOTES
Transitions of care:  Expected discharge to skilled nursing facility and then transition to LTC once Level 2 is resulted and forms received here for nursing home placement. Likely will take until mid next week, approximately 12/21 before pt can discharge to SNF. NICOLE spoke with pt's dad Carmelita Chilel [phone] to provide updates regarding discharge planning for SNF. He verbalized understanding. NICOLE also confirmed with  for level 2  (staff Horacio Benson) that her assessment of pt will occur today with this NICOLE and Jennifer via zoom. Zechariah Staff, [facility]  Care Manager   [phone]      10am  Level 2 screening assessment completed by Carloz Matthews and will be turned into [facility] ([facility]) today. Likely will be at least mid next week before approval paperwork received from [address] of [address] [place] [address] for pt to be able to discharge from hospital to SNF. Updated attending physician of timeframe. Thus far: [facility] has denied  [facility] has accepted. Maco Duque is still reviewing. Imtiaz Fraser is reviewing. Will follow.   Zechariah Staff, Carlos    [phone]

## 2022-12-15 NOTE — WOUND CARE
WOCN Note    Follow up visit at request of RN for ostomy management. Colostomy pouch changed using flat pouch with stoma paste. Supplies left at bedside.     KRISTAN SpicerN ALEXANDRU [facility] Inpatient Wound Care  Available on [employer]  Office [phone]

## 2022-12-15 NOTE — PROGRESS NOTES
Pt is laying in bed with even and unlabored respirations on room air. No complaints or signs of pain. No distress noted. Awaiting placement. Wound care saw the pt during the shift. Safety precautions are in place. Bed in low position and locked. Call bell within reach. Problem: Falls - Risk of  Goal: *Absence of Falls  Description: Document Cherylle Cockayne Fall Risk and appropriate interventions in the flowsheet. Outcome: Progressing Towards Goal  Note: Fall Risk Interventions:       Mentation Interventions: Adequate sleep, hydration, pain control, Door open when patient unattended, Evaluate medications/consider consulting pharmacy    Medication Interventions: Evaluate medications/consider consulting pharmacy, Patient to call before getting OOB    Elimination Interventions: Call light in reach, Patient to call for help with toileting needs              Problem: Patient Education: Go to Patient Education Activity  Goal: Patient/Family Education  Outcome: Progressing Towards Goal     Problem: Hypotension  Goal: *Blood pressure within specified parameters  Outcome: Progressing Towards Goal  Goal: *Fluid volume balance  Outcome: Progressing Towards Goal  Goal: *Labs within defined limits  Outcome: Progressing Towards Goal     Problem: Patient Education: Go to Patient Education Activity  Goal: Patient/Family Education  Outcome: Progressing Towards Goal     Problem: Pressure Injury - Risk of  Goal: *Prevention of pressure injury  Description: Document Vazquez Scale and appropriate interventions in the flowsheet.   Outcome: Progressing Towards Goal  Note: Pressure Injury Interventions:  Sensory Interventions: Assess changes in LOC, Assess need for specialty bed, Avoid rigorous massage over bony prominences    Moisture Interventions: Absorbent underpads, Apply protective barrier, creams and emollients, Check for incontinence Q2 hours and as needed    Activity Interventions: Increase time out of bed, Pressure redistribution bed/mattress(bed type), Assess need for specialty bed    Mobility Interventions: Assess need for specialty bed, HOB 30 degrees or less, Pressure redistribution bed/mattress (bed type)    Nutrition Interventions: Document food/fluid/supplement intake, Offer support with meals,snacks and hydration    Friction and Shear Interventions: Apply protective barrier, creams and emollients, Foam dressings/transparent film/skin sealants, HOB 30 degrees or less                Problem: Patient Education: Go to Patient Education Activity  Goal: Patient/Family Education  Outcome: Progressing Towards Goal     Problem: Patient Education: Go to Patient Education Activity  Goal: Patient/Family Education  Outcome: Progressing Towards Goal

## 2022-12-15 NOTE — PROGRESS NOTES
[address] [facility] Adult  Hospitalist Group                                                                                          Hospitalist Progress Note  [address] [facility], DO  Answering service: [phone] from in house phone        Date of Service:  12/15/2022  NAME:  Michael Duarte  :  1965  MRN:  [id number]      Admission Summary:   Michael Duarte is a 56 y.o. 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, hypothyroidism, chronic hypotension on midodrine, bedbound nonverbal came to the ER from a group home to [facility] with concerns for aspiration pneumonia, patient had an episode of vomiting yesterday, decreased mental status today, had a chest x-ray, concern for multifocal pneumonia, admitted to [facility], became hypotensive, put on Levophed, admitted by ICU team and then transferred to the hospitalist team for further management and evaluation, no further history could be obtained from the patient    Interval history / Subjective: Follow up aspiration pneumonia. Patient is nonverbal, does not express pain or negative emotions. Pt stable. SNF acceptance pending, Level 2. Medically ready. Assessment & Plan:     Her family and outside  feel that she has regressed from her baseline function and would benefit from SNF. SNF placement has been initiated by case management.      Aspiration pneumonia, resolved   --Dysphagia diet per speech, eating well   --s/p cefepime, s/p rocephin (ended 12/10, 5 total days)    Hematuria, stable   Acute on chronic anemia   - Hemoglobin during admission has been stable: ~8-9, however it is notably down from 2 months ago with hemoglobin of ~11 across two checks on  and .   - Nursing noted hematuria in araujo, araujo removed and straight cath obtained, noted to have large blood in UA but without evidence of infection   - Hemoglobin stable over past few days, but is notably down from 2 weeks ago when she was closer to 11 on two separate checks   - Urology consulted, unlikely to be 2/2 bleeding from traumatic araujo insertion   - Obtain hemoccult, ordered  - Hold anticoagulation, SCDs in place    Positive blood culture without bacteremia, resolved   - 1/2 bottles positive for coag-negative Staph, likely contaminant given no leukocytosis and Biofire positive for Staph but not MRSA or MSSA, suspect staph epi   - Repeat blood cultures pending, thus far no growth to date    Pressure wounds, stable   Wound, Pressure Prevention & Skin Care Recommendations:    Minimize layers of linen/pads under patient to optimize support surface. 2.  Turn/reposition approximately every 2 hours and offload heels. Patient mobility team to assist with q2 turns. 3.  Manage moisture/ Keep skin folds clean and dry/minimize brief usage. 4.  Specialty bed: [facility] air mattress ordered from [facility]. 5.  Red erythema to left hip, left foot, right knee and right great toe:  Apply Venelex TID and cover all areas with foam dressings. 6.  Left heel:  Every 3 days cleanse with saline; apply Xeroform and foam dressing. 7.  Right heel:  Protect with foam dressing. Acute on chronic hypotension, resolved  --Continue home midodrine    Anemia, stable   -- Anemia of chronic disease, stable   -- Supplement with iron    Psychomotor retardation  Seizure disorder  --Continue home depakote     Hypothyroidism   --Continue levothyroxine      Code status: DNR  DVT prophylaxis: SCDs    Care Plan discussed with: NICOLE  Anticipated Disposition: SNF/LTC  Anticipated Discharge: Greater than 48 hours, Medically ready, SNF pending     Hospital Problems  Date Reviewed: 11/29/2022            Codes Class Noted POA    Hospital-acquired pneumonia ICD-10-CM: J18.9, Y95  ICD-9-CM: 486  12/6/2022 Unknown           Review of Systems:   Review of systems not obtained due to patient factors.        Vital Signs: Last 24hrs VS reviewed since prior progress note. Most recent are:  Visit Vitals  /78 (BP 1 Location: Left arm, BP Patient Position: At rest)   Pulse 69   Temp (P) 97.4 °F (36.3 °C)   Resp 16   Ht 5' 4\" (1.626 m)   Wt 49.9 kg (110 lb)   SpO2 96%   BMI 18.88 kg/m²       No intake or output data in the 24 hours ending 12/15/22 1435           Physical Examination:     I had a face to face encounter with this patient and independently examined them on 12/15/2022 as outlined below:          General : alert x 0, awake, no acute distress, global delays   HEENT: PEERL, EOMI, moist mucus membrane  Neck: supple, no JVD, no meningeal signs  Chest: Clear to auscultation bilaterally   CVS: S1 S2 heard, Capillary refill less than 2 seconds  Abd: soft/ Non tender, colostomy+  Ext: contracted  Neuro/Psych: pleasant mood and affect, contracted      Data Review:    I personally reviewed  Image and labs      Labs:     Recent Labs     12/15/22  0125 12/14/22  0318   WBC 8.1 6.9   HGB 8.6* 8.5*   HCT 27.9* 27.8*   * 439*       Recent Labs     12/15/22  0125 12/14/22  0318 12/13/22  0238 12/13/22  0233    144 139  --    K 4.9 4.5 4.1  --     106 104  --    CO2 36* 34* 31  --    BUN 46* 36* 30*  --    CREA 0.50* 0.50* 0.51*  --    GLU 76 72 91  --    CA 8.9 9.0 9.1  --    MG 2.3 2.2  --  2.0   PHOS 3.9 3.9  --  3.7       No results for input(s): ALT, AP, TBIL, TBILI, TP, ALB, GLOB, GGT, AML, LPSE in the last 72 hours. No lab exists for component: SGOT, GPT, AMYP, HLPSE    No results for input(s): INR, PTP, APTT, INREXT, INREXT in the last 72 hours. No results for input(s): FE, TIBC, PSAT, FERR in the last 72 hours. Lab Results   Component Value Date/Time    Folate 6.5 08/29/2022 11:15 AM        No results for input(s): PH, PCO2, PO2 in the last 72 hours. No results for input(s): CPK, CKNDX, TROIQ in the last 72 hours.     No lab exists for component: CPKMB  Lab Results   Component Value Date/Time Cholesterol, total 208 (H) 02/14/2022 10:30 AM    HDL Cholesterol 91 02/14/2022 10:30 AM    LDL, calculated 96 02/14/2022 10:30 AM    Triglyceride 121 02/14/2022 10:30 AM     Lab Results   Component Value Date/Time    Glucose (POC) 100 12/12/2022 11:53 AM    Glucose (POC) 82 12/12/2022 06:21 AM    Glucose (POC) 119 (H) 12/11/2022 04:49 PM    Glucose (POC) 89 12/11/2022 11:33 AM    Glucose (POC) 120 (H) 12/10/2022 04:27 PM     Lab Results   Component Value Date/Time    Color YELLOW/STRAW 12/10/2022 06:02 PM    Appearance CLEAR 12/10/2022 06:02 PM    Specific gravity 1.020 12/10/2022 06:02 PM    Specific gravity 1.030 12/05/2022 09:21 PM    pH (UA) 7.5 12/10/2022 06:02 PM    Protein 30 (A) 12/10/2022 06:02 PM    Glucose Negative 12/10/2022 06:02 PM    Ketone Negative 12/10/2022 06:02 PM    Bilirubin Negative 12/10/2022 06:02 PM    Urobilinogen 0.2 12/10/2022 06:02 PM    Nitrites Negative 12/10/2022 06:02 PM    Leukocyte Esterase TRACE (A) 12/10/2022 06:02 PM    Epithelial cells FEW 12/10/2022 06:02 PM    Bacteria Negative 12/10/2022 06:02 PM    WBC 0-4 12/10/2022 06:02 PM    RBC >100 (H) 12/10/2022 06:02 PM         Medications Reviewed:     Current Facility-Administered Medications   Medication Dose Route Frequency    neomycin-polymyxin-gramicidin (NEOSPORIN) 1.75 mg-10,000 unit-0.025mg/mL ophthalmic drops drop 1 Drop  1 Drop Left Eye TID    divalproex DR (DEPAKOTE) tablet 250 mg  250 mg Oral TID    dextrose 10 % infusion 125-250 mL  125-250 mL IntraVENous PRN    gabapentin (NEURONTIN) tablet 1,200 mg  1,200 mg Oral TID    midodrine (PROAMATINE) tablet 10 mg  10 mg Oral ACB&D    balsam peru-castor oiL (VENELEX) ointment   Topical Q8H    sodium chloride (NS) flush 5-40 mL  5-40 mL IntraVENous Q8H    sodium chloride (NS) flush 5-40 mL  5-40 mL IntraVENous PRN    acetaminophen (TYLENOL) tablet 650 mg  650 mg Oral Q6H PRN    Or    acetaminophen (TYLENOL) suppository 650 mg  650 mg Rectal Q6H PRN    polyethylene glycol (MIRALAX) packet 17 g  17 g Oral DAILY PRN    ondansetron (ZOFRAN ODT) tablet 4 mg  4 mg Oral Q8H PRN    Or    ondansetron (ZOFRAN) injection 4 mg  4 mg IntraVENous Q6H PRN    bisacodyL (DULCOLAX) suppository 10 mg  10 mg Rectal DAILY PRN    guaiFENesin-dextromethorphan (ROBITUSSIN DM) 100-10 mg/5 mL syrup 5 mL  5 mL Oral Q6H PRN    diazePAM (VALIUM) tablet 5 mg  5 mg Oral Q6H PRN    hydrOXYzine HCL (ATARAX) tablet 10 mg  10 mg Oral Q6H PRN    levothyroxine (SYNTHROID) tablet 50 mcg  50 mcg Oral ACB    melatonin tablet 3 mg  3 mg Oral QHS PRN    polyethylene glycol (MIRALAX) packet 17 g  17 g Oral DAILY    albuterol-ipratropium (DUO-NEB) 2.5 MG-0.5 MG/3 ML  3 mL Nebulization Q4H PRN     ______________________________________________________________________  EXPECTED LENGTH OF STAY: 3d 0h  ACTUAL LENGTH OF STAY:          9      Please note that this dictation was completed with [employer], the computer voice recognition software. Quite often unanticipated grammatical, syntax, homophones, and other interpretive errors are inadvertently transcribed by the computer software. Please disregard these errors. Please excuse any errors that have escaped final proofreading.                 Angelica Peng,

## 2022-12-16 LAB
MAGNESIUM SERPL-MCNC: 2.4 MG/DL (ref 1.6–2.4)
PHOSPHATE SERPL-MCNC: 4.7 MG/DL (ref 2.6–4.7)

## 2022-12-16 PROCEDURE — 74011250637 HC RX REV CODE- 250/637: Performed by: HOSPITALIST

## 2022-12-16 PROCEDURE — 83735 ASSAY OF MAGNESIUM: CPT

## 2022-12-16 PROCEDURE — 36415 COLL VENOUS BLD VENIPUNCTURE: CPT

## 2022-12-16 PROCEDURE — 84100 ASSAY OF PHOSPHORUS: CPT

## 2022-12-16 PROCEDURE — 74011250637 HC RX REV CODE- 250/637: Performed by: STUDENT IN AN ORGANIZED HEALTH CARE EDUCATION/TRAINING PROGRAM

## 2022-12-16 PROCEDURE — 97530 THERAPEUTIC ACTIVITIES: CPT

## 2022-12-16 PROCEDURE — 74011250637 HC RX REV CODE- 250/637: Performed by: NURSE PRACTITIONER

## 2022-12-16 PROCEDURE — 65270000029 HC RM PRIVATE

## 2022-12-16 PROCEDURE — 74011000250 HC RX REV CODE- 250: Performed by: NURSE PRACTITIONER

## 2022-12-16 RX ADMIN — NEOMYCIN SULFATE, POLYMYXIN B SULFATE AND GRAMICIDIN 1 DROP: 1.75; 10000; .025 SOLUTION/ DROPS OPHTHALMIC at 15:23

## 2022-12-16 RX ADMIN — MIDODRINE HYDROCHLORIDE 10 MG: 5 TABLET ORAL at 06:27

## 2022-12-16 RX ADMIN — DIVALPROEX SODIUM 250 MG: 250 TABLET, DELAYED RELEASE ORAL at 09:36

## 2022-12-16 RX ADMIN — Medication: at 15:22

## 2022-12-16 RX ADMIN — POLYETHYLENE GLYCOL 3350 17 G: 17 POWDER, FOR SOLUTION ORAL at 09:38

## 2022-12-16 RX ADMIN — GABAPENTIN 1200 MG: 600 TABLET, FILM COATED ORAL at 21:29

## 2022-12-16 RX ADMIN — GABAPENTIN 1200 MG: 600 TABLET, FILM COATED ORAL at 16:40

## 2022-12-16 RX ADMIN — NEOMYCIN SULFATE, POLYMYXIN B SULFATE AND GRAMICIDIN 1 DROP: 1.75; 10000; .025 SOLUTION/ DROPS OPHTHALMIC at 21:34

## 2022-12-16 RX ADMIN — SODIUM CHLORIDE, PRESERVATIVE FREE 10 ML: 5 INJECTION INTRAVENOUS at 15:23

## 2022-12-16 RX ADMIN — Medication: at 06:25

## 2022-12-16 RX ADMIN — DIVALPROEX SODIUM 250 MG: 250 TABLET, DELAYED RELEASE ORAL at 16:40

## 2022-12-16 RX ADMIN — MIDODRINE HYDROCHLORIDE 10 MG: 5 TABLET ORAL at 16:40

## 2022-12-16 RX ADMIN — SODIUM CHLORIDE, PRESERVATIVE FREE 10 ML: 5 INJECTION INTRAVENOUS at 06:27

## 2022-12-16 RX ADMIN — SODIUM CHLORIDE, PRESERVATIVE FREE 10 ML: 5 INJECTION INTRAVENOUS at 21:33

## 2022-12-16 RX ADMIN — Medication: at 21:34

## 2022-12-16 RX ADMIN — DIVALPROEX SODIUM 250 MG: 250 TABLET, DELAYED RELEASE ORAL at 21:29

## 2022-12-16 RX ADMIN — LEVOTHYROXINE SODIUM 50 MCG: 0.05 TABLET ORAL at 06:29

## 2022-12-16 RX ADMIN — NEOMYCIN SULFATE, POLYMYXIN B SULFATE AND GRAMICIDIN 1 DROP: 1.75; 10000; .025 SOLUTION/ DROPS OPHTHALMIC at 09:38

## 2022-12-16 RX ADMIN — GABAPENTIN 1200 MG: 600 TABLET, FILM COATED ORAL at 09:36

## 2022-12-16 NOTE — PROGRESS NOTES
NUTRITION  Reason for Assessment: reassess  Recommendations/Interventions/Plan:   Continue pureed diet with moderately thick liquids per SLP  Continue ensure pudding, magic cup once daily  Nursing- Thank you for feeding      Will rescreen per policy       Past Medical History:   Diagnosis Date    Constipation     Hypotension     Psychomotor retardation     severe    Seizure disorder ([facility] [address]        12/16: Follow up. Pt is medically ready for dc, awaiting SNF placement. Pt continues to eat well. Recorded meal and supplement intakes consistently %. Will continue with supplements as ordered. 12/9: Pt screened for dysphagia diet order. Chart/labs/meds reviewed. Admitted with Hospital-acquired pneumonia [J18.9, Y95]. Pt is nonverbal, needs to be fed d/t psychomotor retardation. Seen by SLP who recommended pureed diet with moderately thick liquids. Visited pt at bedside where RN was feeding her breakfast. 100% of tray was eaten, and they were working on an ensure pudding. Per RN no issues with intake. 130# wt in wt history inaccurate- appear to be carried over. There's a wt from CE in 10/22 which is 111#, bedscale wt this week 110# so no concern for recent wt loss. No overt nutrition concerns at this time. Please consult should any arise.          Nutrition Related Findings:   Edema: No data recorded      Last BM: 12/15/22, Soft    Skin: WNL      Current Nutrition Therapies:  Diet: pureed with moderately thick liquids  Supplements: ensure pudding and magic cup once daily  Meal intake: Patient Vitals for the past 168 hrs:   % Diet Eaten   12/13/22 0831 76 - 100%   12/12/22 0927 76 - 100%   12/12/22 0905 76 - 100%   12/09/22 1112 76 - 100%   12/09/22 1031 76 - 100%     Supplement intake: Patient Vitals for the past 168 hrs:   Supplement intake %   12/14/22 0825 76 - 100%         Weight Hx:  Wt Readings from Last 10 Encounters:   12/12/22 49.9 kg (110 lb)   11/29/22 59 kg (130 lb)   09/02/22 59 kg (130 lb)   08/29/22 59 kg (130 lb)   08/10/22 59 kg (130 lb)   02/14/22 59 kg (130 lb)   01/08/21 50.3 kg (111 lb)   06/08/20 48.1 kg (106 lb)   03/20/20 48.2 kg (106 lb 4.2 oz)   01/06/20 46.3 kg (102 lb)         Estimated Nutrition Needs:   Energy: 9172-2166 (25-30 kcal/kg)  Wt used: Current  Protein: 60 (1.2 g/kg)  Wt used: Current   Fluid: 1 ml/kcal       Recent Labs     12/16/22  0300 12/15/22  0125 12/14/22  0318   GLU  --  76 72   BUN  --  46* 36*   CREA  --  0.50* 0.50*   NA  --  144 144   K  --  4.9 4.5   CL  --  107 106   CO2  --  36* 34*   CA  --  8.9 9.0   PHOS 4.7 3.9 3.9   MG 2.4 2.3 2.2       No results for input(s): GLUCPOC in the last 72 hours.       Lab Results   Component Value Date/Time    Hemoglobin A1c 5.5 08/10/2022 02:07 PM    Hemoglobin A1c 5.3 01/08/2021 11:45 AM         Kee Tinooc RD  Available via [employer]

## 2022-12-16 NOTE — PROGRESS NOTES
Problem: Mobility Impaired (Adult and Pediatric)  Goal: *Acute Goals and Plan of Care (Insert Text)  Description: FUNCTIONAL STATUS PRIOR TO ADMISSION: Chart indicates patient is bed bound. Spoke with CM and patient was ambulatory at group home within last year (last time ambulating specifically is unknown). Per CM, father stated that patient was ambulatory at adult group home. HOME SUPPORT PRIOR TO ADMISSION: Recently moved to a new adult group home. Physical Therapy Goals  Initiated 12/9/2022  1. Patient will move from supine to sit and sit to supine , scoot up and down, and roll side to side in bed with minimal assistance within 7 day(s). 2.  Patient will transfer from bed to chair and chair to bed with moderate assistance using the least restrictive device within 7 day(s). 3.  Patient will perform sit to stand with moderate assistance  within 7 day(s). 4.  Patient will ambulate with moderate assistance for 10 feet with the least restrictive device within 7 day(s). Outcome: Resolved/Not Met   PHYSICAL THERAPY TREATMENT/DISCHARGE  Patient: Fara Clay (89 y.o. female)  Date: 12/16/2022  Diagnosis: Hospital-acquired pneumonia [J18.9, Y95] <principal problem not specified>      Precautions: Fall, Seizure  Chart, physical therapy assessment, plan of care and goals were reviewed. ASSESSMENT  Patient continues with skilled PT services and has not progressed towards goals. Patient received in bed sitting up in bed in full W position. Moving back and forth to sidelying. Unable to follow any one step commands even with hand over hand. Repositioned patient in long sitting to attempt mobility but patient would not stay in position returning to sidelying. Repositioned bed alarm. Based on last 3 visits and patient inability to follow commands, deemed not appropriate for skilled PT. Recommend LTC. Will sign off.     Other factors to consider for discharge:          PLAN :  Patient will be discharged from acute skilled physical therapy at this time. Rationale for discharge:  Patient not participating in therapy    Recommendation for discharge: (in order for the patient to meet his/her long term goals)  No skilled physical therapy/ follow up rehabilitation needs identified at this time. This discharge recommendation:  Has not yet been discussed the attending provider and/or case management    IF patient discharges home will need the following DME: none       SUBJECTIVE:   Patient nonverbal    OBJECTIVE DATA SUMMARY:   Critical Behavior:  Neurologic State: Alert  Orientation Level: Unable to verbalize  Cognition: Impaired decision making  Safety/Judgement: Decreased awareness of environment  Functional Mobility Training:  Bed Mobility:        Sit to Supine: Supervision           Balance:  Sitting: Intact      After treatment patient left in no apparent distress:   Supine in bed, Call bell within reach, and Bed / chair alarm activated    COMMUNICATION/COLLABORATION:   The patients plan of care was discussed with: Registered nurse.      Devon Sanderson, PT   Time Calculation: 12 mins

## 2022-12-16 NOTE — PROGRESS NOTES
[facility] [facility] Adult  Hospitalist Group                                                                                          Hospitalist Progress Note  DO Marcella Boston service: [phone] from in house phone        Date of Service:  2022  NAME:  Chio Amor  :  1965  MRN:  [id number]      Admission Summary:   Chio Amor is a 56 y.o. 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, hypothyroidism, chronic hypotension on midodrine, bedbound nonverbal came to the ER from a group home to [facility] with concerns for aspiration pneumonia, patient had an episode of vomiting yesterday, decreased mental status today, had a chest x-ray, concern for multifocal pneumonia, admitted to [facility], became hypotensive, put on Levophed, admitted by ICU team and then transferred to the hospitalist team for further management and evaluation, no further history could be obtained from the patient    Interval history / Subjective: Follow up aspiration pneumonia. Patient is nonverbal, appears hungry. CNA at bedside to assist patient in eating. No acute events. Assessment & Plan:     Her family and outside  feel that she has regressed from her baseline function and would benefit from SNF. SNF placement has been initiated by case management.      Aspiration pneumonia, resolved   --Dysphagia diet per speech, eating well   --s/p cefepime, s/p rocephin (ended 12/10, 5 total days)    Hematuria, stable   Acute on chronic anemia   - Hemoglobin during admission has been stable: ~8-9, however it is notably down from 2 months ago with hemoglobin of ~11 across two checks on  and .   - Nursing noted hematuria in araujo, araujo removed and straight cath obtained, noted to have large blood in UA but without evidence of infection   - Hemoglobin stable over past few days, but is notably down from 2 weeks ago when she was closer to 11 on two separate checks   - Urology consulted, unlikely to be 2/2 bleeding from traumatic araujo insertion   - Obtain hemoccult, ordered  - Hold anticoagulation, SCDs in place    Positive blood culture without bacteremia, resolved   - 1/2 bottles positive for coag-negative Staph, likely contaminant given no leukocytosis and Biofire positive for Staph but not MRSA or MSSA, suspect staph epi   - Repeat blood cultures pending, thus far no growth to date    Pressure wounds, stable   Wound, Pressure Prevention & Skin Care Recommendations:    Minimize layers of linen/pads under patient to optimize support surface. 2.  Turn/reposition approximately every 2 hours and offload heels. Patient mobility team to assist with q2 turns. 3.  Manage moisture/ Keep skin folds clean and dry/minimize brief usage. 4.  Specialty bed: [facility] air mattress ordered from [facility]. 5.  Red erythema to left hip, left foot, right knee and right great toe:  Apply Venelex TID and cover all areas with foam dressings. 6.  Left heel:  Every 3 days cleanse with saline; apply Xeroform and foam dressing. 7.  Right heel:  Protect with foam dressing. Acute on chronic hypotension, resolved  --Continue home midodrine    Anemia, stable   -- Anemia of chronic disease, stable   -- Supplement with iron    Psychomotor retardation  Seizure disorder  --Continue home depakote     Hypothyroidism   --Continue levothyroxine      Code status: DNR  DVT prophylaxis: SCDs    Care Plan discussed with: NICOLE  Anticipated Disposition: SNF/LTC  Anticipated Discharge:Medically ready, SNF pending     Hospital Problems  Date Reviewed: 11/29/2022            Codes Class Noted POA    Hospital-acquired pneumonia ICD-10-CM: J18.9, Y95  ICD-9-CM: 486  12/6/2022 Unknown         Review of Systems:   Review of systems not obtained due to patient factors. Vital Signs:    Last 24hrs VS reviewed since prior progress note. Most recent are:  Visit Vitals  /77 (BP 1 Location: Left arm, BP Patient Position: At rest)   Pulse 61   Temp 97.6 °F (36.4 °C)   Resp 18   Ht 5' 4\" (1.626 m)   Wt 49.9 kg (110 lb)   SpO2 97%   BMI 18.88 kg/m²       No intake or output data in the 24 hours ending 12/16/22 1222           Physical Examination:     I had a face to face encounter with this patient and independently examined them on 12/16/2022 as outlined below:          General : alert x 0, awake, non-verbal, no acute distress, global delays   HEENT: PEERL, EOMI, moist mucus membrane  Neck: supple, no JVD, no meningeal signs  Chest: Clear to auscultation bilaterally   CVS: S1 S2 heard, Capillary refill less than 2 seconds  Abd: soft/ Non tender, colostomy+  Ext: contracted  Neuro/Psych: pleasant mood and affect, moves in bed      Data Review:    I personally reviewed  Image and labs      Labs:     Recent Labs     12/15/22  0125 12/14/22  0318   WBC 8.1 6.9   HGB 8.6* 8.5*   HCT 27.9* 27.8*   * 439*       Recent Labs     12/16/22  0300 12/15/22  0125 12/14/22 0318   NA  --  144 144   K  --  4.9 4.5   CL  --  107 106   CO2  --  36* 34*   BUN  --  46* 36*   CREA  --  0.50* 0.50*   GLU  --  76 72   CA  --  8.9 9.0   MG 2.4 2.3 2.2   PHOS 4.7 3.9 3.9       No results for input(s): ALT, AP, TBIL, TBILI, TP, ALB, GLOB, GGT, AML, LPSE in the last 72 hours. No lab exists for component: SGOT, GPT, AMYP, HLPSE    No results for input(s): INR, PTP, APTT, INREXT, INREXT in the last 72 hours. No results for input(s): FE, TIBC, PSAT, FERR in the last 72 hours. Lab Results   Component Value Date/Time    Folate 6.5 08/29/2022 11:15 AM        No results for input(s): PH, PCO2, PO2 in the last 72 hours. No results for input(s): CPK, CKNDX, TROIQ in the last 72 hours.     No lab exists for component: CPKMB  Lab Results   Component Value Date/Time    Cholesterol, total 208 (H) 02/14/2022 10:30 AM    HDL Cholesterol 91 02/14/2022 10:30 AM    LDL, calculated 96 02/14/2022 10:30 AM    Triglyceride 121 02/14/2022 10:30 AM     Lab Results   Component Value Date/Time    Glucose (POC) 100 12/12/2022 11:53 AM    Glucose (POC) 82 12/12/2022 06:21 AM    Glucose (POC) 119 (H) 12/11/2022 04:49 PM    Glucose (POC) 89 12/11/2022 11:33 AM    Glucose (POC) 120 (H) 12/10/2022 04:27 PM     Lab Results   Component Value Date/Time    Color YELLOW/STRAW 12/10/2022 06:02 PM    Appearance CLEAR 12/10/2022 06:02 PM    Specific gravity 1.020 12/10/2022 06:02 PM    Specific gravity 1.030 12/05/2022 09:21 PM    pH (UA) 7.5 12/10/2022 06:02 PM    Protein 30 (A) 12/10/2022 06:02 PM    Glucose Negative 12/10/2022 06:02 PM    Ketone Negative 12/10/2022 06:02 PM    Bilirubin Negative 12/10/2022 06:02 PM    Urobilinogen 0.2 12/10/2022 06:02 PM    Nitrites Negative 12/10/2022 06:02 PM    Leukocyte Esterase TRACE (A) 12/10/2022 06:02 PM    Epithelial cells FEW 12/10/2022 06:02 PM    Bacteria Negative 12/10/2022 06:02 PM    WBC 0-4 12/10/2022 06:02 PM    RBC >100 (H) 12/10/2022 06:02 PM         Medications Reviewed:     Current Facility-Administered Medications   Medication Dose Route Frequency    neomycin-polymyxin-gramicidin (NEOSPORIN) 1.75 mg-10,000 unit-0.025mg/mL ophthalmic drops drop 1 Drop  1 Drop Left Eye TID    divalproex DR (DEPAKOTE) tablet 250 mg  250 mg Oral TID    dextrose 10 % infusion 125-250 mL  125-250 mL IntraVENous PRN    gabapentin (NEURONTIN) tablet 1,200 mg  1,200 mg Oral TID    midodrine (PROAMATINE) tablet 10 mg  10 mg Oral ACB&D    balsam peru-castor oiL (VENELEX) ointment   Topical Q8H    sodium chloride (NS) flush 5-40 mL  5-40 mL IntraVENous Q8H    sodium chloride (NS) flush 5-40 mL  5-40 mL IntraVENous PRN    acetaminophen (TYLENOL) tablet 650 mg  650 mg Oral Q6H PRN    Or    acetaminophen (TYLENOL) suppository 650 mg  650 mg Rectal Q6H PRN    polyethylene glycol (MIRALAX) packet 17 g  17 g Oral DAILY PRN    ondansetron (ZOFRAN ODT) tablet 4 mg  4 mg Oral Q8H PRN    Or    ondansetron (ZOFRAN) injection 4 mg  4 mg IntraVENous Q6H PRN    bisacodyL (DULCOLAX) suppository 10 mg  10 mg Rectal DAILY PRN    guaiFENesin-dextromethorphan (ROBITUSSIN DM) 100-10 mg/5 mL syrup 5 mL  5 mL Oral Q6H PRN    diazePAM (VALIUM) tablet 5 mg  5 mg Oral Q6H PRN    hydrOXYzine HCL (ATARAX) tablet 10 mg  10 mg Oral Q6H PRN    levothyroxine (SYNTHROID) tablet 50 mcg  50 mcg Oral ACB    melatonin tablet 3 mg  3 mg Oral QHS PRN    polyethylene glycol (MIRALAX) packet 17 g  17 g Oral DAILY    albuterol-ipratropium (DUO-NEB) 2.5 MG-0.5 MG/3 ML  3 mL Nebulization Q4H PRN     ______________________________________________________________________  EXPECTED LENGTH OF STAY: 3d 0h  ACTUAL LENGTH OF STAY:          10      Please note that this dictation was completed with [employer], the computer voice recognition software. Quite often unanticipated grammatical, syntax, homophones, and other interpretive errors are inadvertently transcribed by the computer software. Please disregard these errors. Please excuse any errors that have escaped final proofreading.                 [address] [facility], DO

## 2022-12-16 NOTE — PROGRESS NOTES
Pt is laying in bed with even and unlabored respirations on room air. No complaints or signs of  pain at this time. No distress noted. Awaiting placement at this time. Safety precautions are in place. Bed in low position and locked. Call bell within reach. Problem: Falls - Risk of  Goal: *Absence of Falls  Description: Document Ayush Barajas Fall Risk and appropriate interventions in the flowsheet. Outcome: Progressing Towards Goal  Note: Fall Risk Interventions:       Mentation Interventions: Door open when patient unattended    Medication Interventions: Evaluate medications/consider consulting pharmacy, Patient to call before getting OOB    Elimination Interventions: Call light in reach, Patient to call for help with toileting needs              Problem: Patient Education: Go to Patient Education Activity  Goal: Patient/Family Education  Outcome: Progressing Towards Goal     Problem: Hypotension  Goal: *Blood pressure within specified parameters  Outcome: Progressing Towards Goal  Goal: *Fluid volume balance  Outcome: Progressing Towards Goal  Goal: *Labs within defined limits  Outcome: Progressing Towards Goal     Problem: Patient Education: Go to Patient Education Activity  Goal: Patient/Family Education  Outcome: Progressing Towards Goal     Problem: Pressure Injury - Risk of  Goal: *Prevention of pressure injury  Description: Document Vazquez Scale and appropriate interventions in the flowsheet.   Outcome: Progressing Towards Goal  Note: Pressure Injury Interventions:  Sensory Interventions: Assess changes in LOC, Assess need for specialty bed, Avoid rigorous massage over bony prominences    Moisture Interventions: Absorbent underpads, Apply protective barrier, creams and emollients, Check for incontinence Q2 hours and as needed, Assess need for specialty bed, Internal/External fecal devices    Activity Interventions: Assess need for specialty bed, Increase time out of bed    Mobility Interventions: Assess need for specialty bed, Float heels, Pressure redistribution bed/mattress (bed type)    Nutrition Interventions: Document food/fluid/supplement intake, Offer support with meals,snacks and hydration    Friction and Shear Interventions: Apply protective barrier, creams and emollients, Feet elevated on foot rest, Foam dressings/transparent film/skin sealants, HOB 30 degrees or less                Problem: Patient Education: Go to Patient Education Activity  Goal: Patient/Family Education  Outcome: Progressing Towards Goal     Problem: Patient Education: Go to Patient Education Activity  Goal: Patient/Family Education  Outcome: Progressing Towards Goal

## 2022-12-17 PROCEDURE — 74011000250 HC RX REV CODE- 250: Performed by: NURSE PRACTITIONER

## 2022-12-17 PROCEDURE — 74011250637 HC RX REV CODE- 250/637: Performed by: HOSPITALIST

## 2022-12-17 PROCEDURE — 65270000029 HC RM PRIVATE

## 2022-12-17 PROCEDURE — 74011250637 HC RX REV CODE- 250/637: Performed by: NURSE PRACTITIONER

## 2022-12-17 PROCEDURE — 74011250637 HC RX REV CODE- 250/637: Performed by: STUDENT IN AN ORGANIZED HEALTH CARE EDUCATION/TRAINING PROGRAM

## 2022-12-17 RX ADMIN — LEVOTHYROXINE SODIUM 50 MCG: 0.05 TABLET ORAL at 06:55

## 2022-12-17 RX ADMIN — SODIUM CHLORIDE, PRESERVATIVE FREE 10 ML: 5 INJECTION INTRAVENOUS at 22:30

## 2022-12-17 RX ADMIN — POLYETHYLENE GLYCOL 3350 17 G: 17 POWDER, FOR SOLUTION ORAL at 11:28

## 2022-12-17 RX ADMIN — DIVALPROEX SODIUM 250 MG: 250 TABLET, DELAYED RELEASE ORAL at 22:27

## 2022-12-17 RX ADMIN — GABAPENTIN 1200 MG: 600 TABLET, FILM COATED ORAL at 11:28

## 2022-12-17 RX ADMIN — MIDODRINE HYDROCHLORIDE 10 MG: 5 TABLET ORAL at 06:55

## 2022-12-17 RX ADMIN — DIVALPROEX SODIUM 250 MG: 250 TABLET, DELAYED RELEASE ORAL at 11:28

## 2022-12-17 RX ADMIN — Medication: at 22:30

## 2022-12-17 RX ADMIN — GABAPENTIN 1200 MG: 600 TABLET, FILM COATED ORAL at 22:27

## 2022-12-17 RX ADMIN — Medication 3 MG: at 22:27

## 2022-12-17 RX ADMIN — Medication: at 05:45

## 2022-12-17 RX ADMIN — NEOMYCIN SULFATE, POLYMYXIN B SULFATE AND GRAMICIDIN 1 DROP: 1.75; 10000; .025 SOLUTION/ DROPS OPHTHALMIC at 11:28

## 2022-12-17 RX ADMIN — NEOMYCIN SULFATE, POLYMYXIN B SULFATE AND GRAMICIDIN 1 DROP: 1.75; 10000; .025 SOLUTION/ DROPS OPHTHALMIC at 22:30

## 2022-12-17 RX ADMIN — SODIUM CHLORIDE, PRESERVATIVE FREE 10 ML: 5 INJECTION INTRAVENOUS at 05:46

## 2022-12-17 NOTE — PROGRESS NOTES
[address] [facility] Adult  Hospitalist Group                                                                                          Hospitalist Progress Note  Kaden Salas NP  Answering service: [phone] from in house phone        Date of Service:  2022  NAME:  Flower Shrestha  :  1965  MRN:  [id number]      Admission Summary:   Flower Shrestha is a 56 y.o. 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, hypothyroidism, chronic hypotension on midodrine, bedbound nonverbal came to the ER from a group home to [facility] with concerns for aspiration pneumonia, patient had an episode of vomiting yesterday, decreased mental status today, had a chest x-ray, concern for multifocal pneumonia, admitted to [facility], became hypotensive, put on Levophed, admitted by ICU team and then transferred to the hospitalist team for further management and evaluation, no further history could be obtained from the patient    Interval history / Subjective:   Lying in bed, no acute distress. Assessment & Plan:     Her family and outside  feel that she has regressed from her baseline function and would benefit from SNF. SNF placement has been initiated by case management.      Aspiration pneumonia, resolved   --Dysphagia diet per speech, eating well   --s/p cefepime, s/p rocephin (ended 12/10, 5 total days)    Hematuria, stable   Acute on chronic anemia   - Hemoglobin during admission has been stable: ~8-9, however it is notably down from 2 months ago with hemoglobin of ~11 across two checks on  and .   - Nursing noted hematuria in araujo, araujo removed and straight cath obtained, noted to have large blood in UA but without evidence of infection   - Hemoglobin stable over past few days, but is notably down from 2 weeks ago when she was closer to 11 on two separate checks   - Urology consulted, unlikely to be 2/2 bleeding from traumatic araujo insertion   - Obtain hemoccult, ordered  - Hold anticoagulation, SCDs in place  - 12/17 order iron profiile/ferritin, B12, folate, stool for hemoccult blood    Positive blood culture without bacteremia, resolved   - 1/2 bottles positive for coag-negative Staph, likely contaminant given no leukocytosis and Biofire positive for Staph but not MRSA or MSSA, suspect staph epi   - Repeat blood cultures pending, thus far no growth to date    Pressure wounds, stable   Wound, Pressure Prevention & Skin Care Recommendations:    Minimize layers of linen/pads under patient to optimize support surface. 2.  Turn/reposition approximately every 2 hours and offload heels. Patient mobility team to assist with q2 turns. 3.  Manage moisture/ Keep skin folds clean and dry/minimize brief usage. 4.  Specialty bed: [employer] air mattress ordered from [facility]. 5.  Red erythema to left hip, left foot, right knee and right great toe:  Apply Venelex TID and cover all areas with foam dressings. 6.  Left heel:  Every 3 days cleanse with saline; apply Xeroform and foam dressing. 7.  Right heel:  Protect with foam dressing. Acute on chronic hypotension, resolved  --Continue home midodrine    Psychomotor retardation  Seizure disorder  --Continue home depakote     Hypothyroidism   --Continue levothyroxine      Code status: DNR  DVT prophylaxis: SCDs  Care Plan discussed with: NICOLE  Anticipated Disposition: SNF/LTC  Anticipated Discharge:Medically ready, SNF pending     Hospital Problems  Date Reviewed: 11/29/2022            Codes Class Noted POA    Hospital-acquired pneumonia ICD-10-CM: J18.9, Y95  ICD-9-CM: 486  12/6/2022 Unknown         Review of Systems:   Review of systems not obtained due to patient factors. Vital Signs:    Last 24hrs VS reviewed since prior progress note.  Most recent are:  Visit Vitals  /81 (BP 1 Location: Right arm, BP Patient Position: At rest)   Pulse 72   Temp 98.5 °F (36.9 °C)   Resp 17   Ht 5' 4\" (1.626 m)   Wt 45.6 kg (100 lb 8.5 oz)   SpO2 97%   BMI 17.26 kg/m²       No intake or output data in the 24 hours ending 12/17/22 1058           Physical Examination:     I had a face to face encounter with this patient and independently examined them on 12/17/2022 as outlined below:          General : alert, awake, non-verbal, no acute distress, global delays   HEENT: PEERL, EOMI, moist mucus membrane  Neck: supple, no JVD, no meningeal signs  Chest: Clear to auscultation bilaterally   CVS: S1 S2 heard, Capillary refill less than 2 seconds  Abd: soft/ Non tender, colostomy+  Ext: contracted  Neuro/Psych: pleasant mood and affect, moves in bed. Non verbal, doesn't follow commands      Data Review:    I personally reviewed  Image and labs      Labs:     Recent Labs     12/15/22  0125   WBC 8.1   HGB 8.6*   HCT 27.9*   *       Recent Labs     12/16/22  0300 12/15/22  0125   NA  --  144   K  --  4.9   CL  --  107   CO2  --  36*   BUN  --  46*   CREA  --  0.50*   GLU  --  76   CA  --  8.9   MG 2.4 2.3   PHOS 4.7 3.9       No results for input(s): ALT, AP, TBIL, TBILI, TP, ALB, GLOB, GGT, AML, LPSE in the last 72 hours. No lab exists for component: SGOT, GPT, AMYP, HLPSE    No results for input(s): INR, PTP, APTT, INREXT, INREXT in the last 72 hours. No results for input(s): FE, TIBC, PSAT, FERR in the last 72 hours. Lab Results   Component Value Date/Time    Folate 6.5 08/29/2022 11:15 AM        No results for input(s): PH, PCO2, PO2 in the last 72 hours. No results for input(s): CPK, CKNDX, TROIQ in the last 72 hours.     No lab exists for component: CPKMB  Lab Results   Component Value Date/Time    Cholesterol, total 208 (H) 02/14/2022 10:30 AM    HDL Cholesterol 91 02/14/2022 10:30 AM    LDL, calculated 96 02/14/2022 10:30 AM    Triglyceride 121 02/14/2022 10:30 AM     Lab Results   Component Value Date/Time    Glucose (POC) 100 12/12/2022 11:53 AM Glucose (POC) 82 12/12/2022 06:21 AM    Glucose (POC) 119 (H) 12/11/2022 04:49 PM    Glucose (POC) 89 12/11/2022 11:33 AM    Glucose (POC) 120 (H) 12/10/2022 04:27 PM     Lab Results   Component Value Date/Time    Color YELLOW/STRAW 12/10/2022 06:02 PM    Appearance CLEAR 12/10/2022 06:02 PM    Specific gravity 1.020 12/10/2022 06:02 PM    Specific gravity 1.030 12/05/2022 09:21 PM    pH (UA) 7.5 12/10/2022 06:02 PM    Protein 30 (A) 12/10/2022 06:02 PM    Glucose Negative 12/10/2022 06:02 PM    Ketone Negative 12/10/2022 06:02 PM    Bilirubin Negative 12/10/2022 06:02 PM    Urobilinogen 0.2 12/10/2022 06:02 PM    Nitrites Negative 12/10/2022 06:02 PM    Leukocyte Esterase TRACE (A) 12/10/2022 06:02 PM    Epithelial cells FEW 12/10/2022 06:02 PM    Bacteria Negative 12/10/2022 06:02 PM    WBC 0-4 12/10/2022 06:02 PM    RBC >100 (H) 12/10/2022 06:02 PM         Medications Reviewed:     Current Facility-Administered Medications   Medication Dose Route Frequency    neomycin-polymyxin-gramicidin (NEOSPORIN) 1.75 mg-10,000 unit-0.025mg/mL ophthalmic drops drop 1 Drop  1 Drop Left Eye TID    divalproex DR (DEPAKOTE) tablet 250 mg  250 mg Oral TID    dextrose 10 % infusion 125-250 mL  125-250 mL IntraVENous PRN    gabapentin (NEURONTIN) tablet 1,200 mg  1,200 mg Oral TID    midodrine (PROAMATINE) tablet 10 mg  10 mg Oral ACB&D    balsam peru-castor oiL (VENELEX) ointment   Topical Q8H    sodium chloride (NS) flush 5-40 mL  5-40 mL IntraVENous Q8H    sodium chloride (NS) flush 5-40 mL  5-40 mL IntraVENous PRN    acetaminophen (TYLENOL) tablet 650 mg  650 mg Oral Q6H PRN    Or    acetaminophen (TYLENOL) suppository 650 mg  650 mg Rectal Q6H PRN    polyethylene glycol (MIRALAX) packet 17 g  17 g Oral DAILY PRN    ondansetron (ZOFRAN ODT) tablet 4 mg  4 mg Oral Q8H PRN    Or    ondansetron (ZOFRAN) injection 4 mg  4 mg IntraVENous Q6H PRN    bisacodyL (DULCOLAX) suppository 10 mg  10 mg Rectal DAILY PRN guaiFENesin-dextromethorphan (ROBITUSSIN DM) 100-10 mg/5 mL syrup 5 mL  5 mL Oral Q6H PRN    diazePAM (VALIUM) tablet 5 mg  5 mg Oral Q6H PRN    hydrOXYzine HCL (ATARAX) tablet 10 mg  10 mg Oral Q6H PRN    levothyroxine (SYNTHROID) tablet 50 mcg  50 mcg Oral ACB    melatonin tablet 3 mg  3 mg Oral QHS PRN    polyethylene glycol (MIRALAX) packet 17 g  17 g Oral DAILY    albuterol-ipratropium (DUO-NEB) 2.5 MG-0.5 MG/3 ML  3 mL Nebulization Q4H PRN     ______________________________________________________________________  EXPECTED LENGTH OF STAY: 3d 0h  ACTUAL LENGTH OF STAY:          11      Please note that this dictation was completed with [employer], the computer voice recognition software. Quite often unanticipated grammatical, syntax, homophones, and other interpretive errors are inadvertently transcribed by the computer software. Please disregard these errors. Please excuse any errors that have escaped final proofreading.                 Frederick Guzman NP

## 2022-12-18 LAB
FERRITIN SERPL-MCNC: 180 NG/ML (ref 26–388)
FOLATE SERPL-MCNC: 8.3 NG/ML (ref 5–21)
GLUCOSE BLD STRIP.AUTO-MCNC: 102 MG/DL (ref 65–117)
HEMOCCULT STL QL: NEGATIVE
IRON SATN MFR SERPL: 16 % (ref 20–50)
IRON SERPL-MCNC: 100 UG/DL (ref 35–150)
SERVICE CMNT-IMP: NORMAL
TIBC SERPL-MCNC: 638 UG/DL (ref 250–450)
VIT B12 SERPL-MCNC: 1195 PG/ML (ref 193–986)

## 2022-12-18 PROCEDURE — 82272 OCCULT BLD FECES 1-3 TESTS: CPT

## 2022-12-18 PROCEDURE — 65270000029 HC RM PRIVATE

## 2022-12-18 PROCEDURE — 82962 GLUCOSE BLOOD TEST: CPT

## 2022-12-18 PROCEDURE — 74011250637 HC RX REV CODE- 250/637: Performed by: STUDENT IN AN ORGANIZED HEALTH CARE EDUCATION/TRAINING PROGRAM

## 2022-12-18 PROCEDURE — 74011250637 HC RX REV CODE- 250/637: Performed by: HOSPITALIST

## 2022-12-18 PROCEDURE — 82607 VITAMIN B-12: CPT

## 2022-12-18 PROCEDURE — 83540 ASSAY OF IRON: CPT

## 2022-12-18 PROCEDURE — 74011250637 HC RX REV CODE- 250/637: Performed by: NURSE PRACTITIONER

## 2022-12-18 PROCEDURE — 74011000250 HC RX REV CODE- 250: Performed by: NURSE PRACTITIONER

## 2022-12-18 PROCEDURE — 82728 ASSAY OF FERRITIN: CPT

## 2022-12-18 PROCEDURE — 82746 ASSAY OF FOLIC ACID SERUM: CPT

## 2022-12-18 PROCEDURE — 36415 COLL VENOUS BLD VENIPUNCTURE: CPT

## 2022-12-18 RX ADMIN — NEOMYCIN SULFATE, POLYMYXIN B SULFATE AND GRAMICIDIN 1 DROP: 1.75; 10000; .025 SOLUTION/ DROPS OPHTHALMIC at 11:39

## 2022-12-18 RX ADMIN — MIDODRINE HYDROCHLORIDE 10 MG: 5 TABLET ORAL at 17:52

## 2022-12-18 RX ADMIN — NEOMYCIN SULFATE, POLYMYXIN B SULFATE AND GRAMICIDIN 1 DROP: 1.75; 10000; .025 SOLUTION/ DROPS OPHTHALMIC at 21:38

## 2022-12-18 RX ADMIN — DIVALPROEX SODIUM 250 MG: 250 TABLET, DELAYED RELEASE ORAL at 21:37

## 2022-12-18 RX ADMIN — POLYETHYLENE GLYCOL 3350 17 G: 17 POWDER, FOR SOLUTION ORAL at 11:39

## 2022-12-18 RX ADMIN — SODIUM CHLORIDE, PRESERVATIVE FREE 10 ML: 5 INJECTION INTRAVENOUS at 21:37

## 2022-12-18 RX ADMIN — LEVOTHYROXINE SODIUM 50 MCG: 0.05 TABLET ORAL at 06:15

## 2022-12-18 RX ADMIN — GABAPENTIN 1200 MG: 600 TABLET, FILM COATED ORAL at 11:39

## 2022-12-18 RX ADMIN — Medication: at 17:52

## 2022-12-18 RX ADMIN — GABAPENTIN 1200 MG: 600 TABLET, FILM COATED ORAL at 17:52

## 2022-12-18 RX ADMIN — Medication 3 MG: at 21:37

## 2022-12-18 RX ADMIN — GABAPENTIN 1200 MG: 600 TABLET, FILM COATED ORAL at 21:37

## 2022-12-18 RX ADMIN — MIDODRINE HYDROCHLORIDE 10 MG: 5 TABLET ORAL at 06:15

## 2022-12-18 RX ADMIN — DIVALPROEX SODIUM 250 MG: 250 TABLET, DELAYED RELEASE ORAL at 11:39

## 2022-12-18 RX ADMIN — Medication: at 06:14

## 2022-12-18 RX ADMIN — Medication: at 21:38

## 2022-12-18 RX ADMIN — DIVALPROEX SODIUM 250 MG: 250 TABLET, DELAYED RELEASE ORAL at 17:52

## 2022-12-18 RX ADMIN — NEOMYCIN SULFATE, POLYMYXIN B SULFATE AND GRAMICIDIN 1 DROP: 1.75; 10000; .025 SOLUTION/ DROPS OPHTHALMIC at 17:53

## 2022-12-18 NOTE — PROGRESS NOTES
Problem: Falls - Risk of  Goal: *Absence of Falls  Description: Document Katarina Ground Fall Risk and appropriate interventions in the flowsheet. Outcome: Progressing Towards Goal  Note: Fall Risk Interventions:       Mentation Interventions: Door open when patient unattended    Medication Interventions: Evaluate medications/consider consulting pharmacy, Patient to call before getting OOB    Elimination Interventions: Call light in reach, Patient to call for help with toileting needs              Problem: Patient Education: Go to Patient Education Activity  Goal: Patient/Family Education  Outcome: Progressing Towards Goal     Problem: Hypotension  Goal: *Blood pressure within specified parameters  Outcome: Progressing Towards Goal  Goal: *Fluid volume balance  Outcome: Progressing Towards Goal  Goal: *Labs within defined limits  Outcome: Progressing Towards Goal     Problem: Patient Education: Go to Patient Education Activity  Goal: Patient/Family Education  Outcome: Progressing Towards Goal     Problem: Pressure Injury - Risk of  Goal: *Prevention of pressure injury  Description: Document Vazquez Scale and appropriate interventions in the flowsheet.   Outcome: Progressing Towards Goal  Note: Pressure Injury Interventions:  Sensory Interventions: Assess changes in LOC, Assess need for specialty bed, Avoid rigorous massage over bony prominences    Moisture Interventions: Absorbent underpads    Activity Interventions: Pressure redistribution bed/mattress(bed type)    Mobility Interventions: Assess need for specialty bed    Nutrition Interventions: Document food/fluid/supplement intake    Friction and Shear Interventions: Apply protective barrier, creams and emollients                Problem: Patient Education: Go to Patient Education Activity  Goal: Patient/Family Education  Outcome: Progressing Towards Goal     Problem: Patient Education: Go to Patient Education Activity  Goal: Patient/Family Education  Outcome: Progressing Towards Goal

## 2022-12-18 NOTE — PROGRESS NOTES
[facility]  Hospitalist Group                                                                                          Hospitalist Progress Note  DO Marcella Cloud service: [phone] from in house phone        Date of Service:  2022  NAME:  Lulu Archuleta  :  1965  MRN:  [id number]      Admission Summary:   Lulu Archuleta is a 56 y.o. 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 Patient seen and examined. Sitting in bed. Staff assisting in eating. No acute events. Assessment & Plan:     Her family and outside  feel that she has regressed from her baseline function and would benefit from SNF. SNF placement has been initiated by case management.      Aspiration pneumonia, resolved   --Dysphagia diet per speech, eating well   --s/p cefepime, s/p rocephin (ended 12/10, 5 total days)    Hematuria, stable   Acute on chronic anemia   - Hemoglobin during admission has been stable: ~8-9, however it is notably down from 2 months ago with hemoglobin of ~11 across two checks on  and .   - Nursing noted hematuria in araujo, araujo removed and straight cath obtained, noted to have large blood in UA but without evidence of infection   - Hemoglobin stable over past few days, but is notably down from 2 weeks ago when she was closer to 11 on two separate checks   - Urology consulted, unlikely to be 2/2 bleeding from traumatic araujo insertion   - Obtain hemoccult, ordered  - Hold anticoagulation, SCDs in place  - 12/17 order iron profiile/ferritin, B12, folate, stool for hemoccult blood    Positive blood culture without bacteremia, resolved   - 1/2 bottles positive for coag-negative Staph, likely contaminant given no leukocytosis and Biofire positive for Staph but not MRSA or MSSA, suspect staph epi   - Repeat blood cultures pending, thus far no growth to date    Pressure wounds, stable   Wound, Pressure Prevention & Skin Care Recommendations:    Minimize layers of linen/pads under patient to optimize support surface. 2.  Turn/reposition approximately every 2 hours and offload heels. Patient mobility team to assist with q2 turns. 3.  Manage moisture/ Keep skin folds clean and dry/minimize brief usage. 4.  Specialty bed: [facility] air mattress ordered from [facility]. 5.  Red erythema to left hip, left foot, right knee and right great toe:  Apply Venelex TID and cover all areas with foam dressings. 6.  Left heel:  Every 3 days cleanse with saline; apply Xeroform and foam dressing. 7.  Right heel:  Protect with foam dressing. Acute on chronic hypotension, resolved  --Continue home midodrine    Psychomotor retardation  Seizure disorder  --Continue home depakote     Hypothyroidism   --Continue levothyroxine      Code status: DNR  DVT prophylaxis: SCDs  Care Plan discussed with: NICOLE  Anticipated Disposition: SNF/LTC  Anticipated Discharge:Medically ready, SNF pending     Hospital Problems  Date Reviewed: 11/29/2022            Codes Class Noted POA    Hospital-acquired pneumonia ICD-10-CM: J18.9, Y95  ICD-9-CM: 486  12/6/2022 Unknown       Review of Systems:   Review of systems not obtained due to patient factors. Vital Signs:    Last 24hrs VS reviewed since prior progress note.  Most recent are:  Visit Vitals  /85 (BP 1 Location: Right leg, BP Patient Position: At rest;Other (Comment))   Pulse 73   Temp 97 °F (36.1 °C)   Resp 16   Ht 5' 4\" (1.626 m)   Wt 46.1 kg (101 lb 10.1 oz)   SpO2 94%   BMI 17.45 kg/m²       No intake or output data in the 24 hours ending 12/18/22 1508           Physical Examination:     I had a face to face encounter with this patient and independently examined them on 12/18/2022 as outlined below:          General : alert, awake, non-verbal, no acute distress, global delays   HEENT: PEERL, EOMI, moist mucus membrane  Neck: supple, no JVD, no meningeal signs  Chest: Clear to auscultation bilaterally   CVS: S1 S2 heard, Capillary refill less than 2 seconds  Abd: soft/ Non tender, colostomy+  Ext: contracted  Neuro/Psych: pleasant mood and affect, moves in bed. Non verbal, doesn't follow commands      Data Review:    I personally reviewed  Image and labs      Labs:     No results for input(s): WBC, HGB, HCT, PLT, HGBEXT, HCTEXT, PLTEXT, HGBEXT, HCTEXT, PLTEXT in the last 72 hours. Recent Labs     12/16/22  0300   MG 2.4   PHOS 4.7       No results for input(s): ALT, AP, TBIL, TBILI, TP, ALB, GLOB, GGT, AML, LPSE in the last 72 hours. No lab exists for component: SGOT, GPT, AMYP, HLPSE    No results for input(s): INR, PTP, APTT, INREXT, INREXT in the last 72 hours. Recent Labs     12/18/22  0256   TIBC 638*   PSAT 16*   FERR 180        Lab Results   Component Value Date/Time    Folate 8.3 12/18/2022 02:56 AM        No results for input(s): PH, PCO2, PO2 in the last 72 hours. No results for input(s): CPK, CKNDX, TROIQ in the last 72 hours.     No lab exists for component: CPKMB  Lab Results   Component Value Date/Time    Cholesterol, total 208 (H) 02/14/2022 10:30 AM    HDL Cholesterol 91 02/14/2022 10:30 AM    LDL, calculated 96 02/14/2022 10:30 AM    Triglyceride 121 02/14/2022 10:30 AM     Lab Results   Component Value Date/Time    Glucose (POC) 102 12/18/2022 11:51 AM Glucose (POC) 100 12/12/2022 11:53 AM    Glucose (POC) 82 12/12/2022 06:21 AM    Glucose (POC) 119 (H) 12/11/2022 04:49 PM    Glucose (POC) 89 12/11/2022 11:33 AM     Lab Results   Component Value Date/Time    Color YELLOW/STRAW 12/10/2022 06:02 PM    Appearance CLEAR 12/10/2022 06:02 PM    Specific gravity 1.020 12/10/2022 06:02 PM    Specific gravity 1.030 12/05/2022 09:21 PM    pH (UA) 7.5 12/10/2022 06:02 PM    Protein 30 (A) 12/10/2022 06:02 PM    Glucose Negative 12/10/2022 06:02 PM    Ketone Negative 12/10/2022 06:02 PM    Bilirubin Negative 12/10/2022 06:02 PM    Urobilinogen 0.2 12/10/2022 06:02 PM    Nitrites Negative 12/10/2022 06:02 PM    Leukocyte Esterase TRACE (A) 12/10/2022 06:02 PM    Epithelial cells FEW 12/10/2022 06:02 PM    Bacteria Negative 12/10/2022 06:02 PM    WBC 0-4 12/10/2022 06:02 PM    RBC >100 (H) 12/10/2022 06:02 PM         Medications Reviewed:     Current Facility-Administered Medications   Medication Dose Route Frequency    neomycin-polymyxin-gramicidin (NEOSPORIN) 1.75 mg-10,000 unit-0.025mg/mL ophthalmic drops drop 1 Drop  1 Drop Left Eye TID    divalproex DR (DEPAKOTE) tablet 250 mg  250 mg Oral TID    dextrose 10 % infusion 125-250 mL  125-250 mL IntraVENous PRN    gabapentin (NEURONTIN) tablet 1,200 mg  1,200 mg Oral TID    midodrine (PROAMATINE) tablet 10 mg  10 mg Oral ACB&D    balsam peru-castor oiL (VENELEX) ointment   Topical Q8H    sodium chloride (NS) flush 5-40 mL  5-40 mL IntraVENous Q8H    sodium chloride (NS) flush 5-40 mL  5-40 mL IntraVENous PRN    acetaminophen (TYLENOL) tablet 650 mg  650 mg Oral Q6H PRN    Or    acetaminophen (TYLENOL) suppository 650 mg  650 mg Rectal Q6H PRN    polyethylene glycol (MIRALAX) packet 17 g  17 g Oral DAILY PRN    ondansetron (ZOFRAN ODT) tablet 4 mg  4 mg Oral Q8H PRN    Or    ondansetron (ZOFRAN) injection 4 mg  4 mg IntraVENous Q6H PRN    bisacodyL (DULCOLAX) suppository 10 mg  10 mg Rectal DAILY PRN guaiFENesin-dextromethorphan (ROBITUSSIN DM) 100-10 mg/5 mL syrup 5 mL  5 mL Oral Q6H PRN    diazePAM (VALIUM) tablet 5 mg  5 mg Oral Q6H PRN    hydrOXYzine HCL (ATARAX) tablet 10 mg  10 mg Oral Q6H PRN    levothyroxine (SYNTHROID) tablet 50 mcg  50 mcg Oral ACB    melatonin tablet 3 mg  3 mg Oral QHS PRN    polyethylene glycol (MIRALAX) packet 17 g  17 g Oral DAILY    albuterol-ipratropium (DUO-NEB) 2.5 MG-0.5 MG/3 ML  3 mL Nebulization Q4H PRN     ______________________________________________________________________  EXPECTED LENGTH OF STAY: 3d 0h  ACTUAL LENGTH OF STAY:          12      Please note that this dictation was completed with [employer], the computer voice recognition software. Quite often unanticipated grammatical, syntax, homophones, and other interpretive errors are inadvertently transcribed by the computer software. Please disregard these errors. Please excuse any errors that have escaped final proofreading.                 [address], DO

## 2022-12-19 PROCEDURE — 74011250637 HC RX REV CODE- 250/637: Performed by: STUDENT IN AN ORGANIZED HEALTH CARE EDUCATION/TRAINING PROGRAM

## 2022-12-19 PROCEDURE — 74011000250 HC RX REV CODE- 250: Performed by: NURSE PRACTITIONER

## 2022-12-19 PROCEDURE — 74011250637 HC RX REV CODE- 250/637: Performed by: HOSPITALIST

## 2022-12-19 PROCEDURE — 74011250637 HC RX REV CODE- 250/637: Performed by: NURSE PRACTITIONER

## 2022-12-19 PROCEDURE — 65270000029 HC RM PRIVATE

## 2022-12-19 RX ADMIN — Medication: at 23:12

## 2022-12-19 RX ADMIN — MIDODRINE HYDROCHLORIDE 10 MG: 5 TABLET ORAL at 15:44

## 2022-12-19 RX ADMIN — GABAPENTIN 1200 MG: 600 TABLET, FILM COATED ORAL at 09:27

## 2022-12-19 RX ADMIN — Medication: at 13:54

## 2022-12-19 RX ADMIN — NEOMYCIN SULFATE, POLYMYXIN B SULFATE AND GRAMICIDIN 1 DROP: 1.75; 10000; .025 SOLUTION/ DROPS OPHTHALMIC at 15:45

## 2022-12-19 RX ADMIN — SODIUM CHLORIDE, PRESERVATIVE FREE 10 ML: 5 INJECTION INTRAVENOUS at 13:55

## 2022-12-19 RX ADMIN — POLYETHYLENE GLYCOL 3350 17 G: 17 POWDER, FOR SOLUTION ORAL at 09:27

## 2022-12-19 RX ADMIN — DIVALPROEX SODIUM 250 MG: 250 TABLET, DELAYED RELEASE ORAL at 15:44

## 2022-12-19 RX ADMIN — NEOMYCIN SULFATE, POLYMYXIN B SULFATE AND GRAMICIDIN 1 DROP: 1.75; 10000; .025 SOLUTION/ DROPS OPHTHALMIC at 23:13

## 2022-12-19 RX ADMIN — SODIUM CHLORIDE, PRESERVATIVE FREE 10 ML: 5 INJECTION INTRAVENOUS at 06:08

## 2022-12-19 RX ADMIN — GABAPENTIN 1200 MG: 600 TABLET, FILM COATED ORAL at 15:44

## 2022-12-19 RX ADMIN — GABAPENTIN 1200 MG: 600 TABLET, FILM COATED ORAL at 23:12

## 2022-12-19 RX ADMIN — DIVALPROEX SODIUM 250 MG: 250 TABLET, DELAYED RELEASE ORAL at 23:12

## 2022-12-19 RX ADMIN — NEOMYCIN SULFATE, POLYMYXIN B SULFATE AND GRAMICIDIN 1 DROP: 1.75; 10000; .025 SOLUTION/ DROPS OPHTHALMIC at 09:27

## 2022-12-19 RX ADMIN — SODIUM CHLORIDE, PRESERVATIVE FREE 10 ML: 5 INJECTION INTRAVENOUS at 23:13

## 2022-12-19 RX ADMIN — Medication: at 06:07

## 2022-12-19 RX ADMIN — LEVOTHYROXINE SODIUM 50 MCG: 0.05 TABLET ORAL at 06:06

## 2022-12-19 RX ADMIN — MIDODRINE HYDROCHLORIDE 10 MG: 5 TABLET ORAL at 06:05

## 2022-12-19 RX ADMIN — Medication 3 MG: at 23:12

## 2022-12-19 RX ADMIN — DIVALPROEX SODIUM 250 MG: 250 TABLET, DELAYED RELEASE ORAL at 09:27

## 2022-12-19 NOTE — PROGRESS NOTES
[address] [facility] Adult  Hospitalist Group                                                                                          Hospitalist Progress Note  [address] [facility], DO  Answering service: [phone] from in house phone        Date of Service:  2022  NAME:  Baudilio Dueñas  :  1965  MRN:  [id number]      Admission Summary:   Baudilio Dueñas is a 56 y.o. 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, hypothyroidism, chronic hypotension on midodrine, bedbound nonverbal came to the ER from a group home to [facility] with concerns for aspiration pneumonia, patient had an episode of vomiting yesterday, decreased mental status today, had a chest x-ray, concern for multifocal pneumonia, admitted to [facility], became hypotensive, put on Levophed, admitted by ICU team and then transferred to the hospitalist team for further management and evaluation, no further history could be obtained from the patient    Interval history / Subjective: Follow up aspiration PNA. Patient seen and examined. Sitting in bed. Playing with magazine. No acute events. Assessment & Plan:     Her family and outside  feel that she has regressed from her baseline function and would benefit from SNF. SNF placement has been initiated by case management.      Aspiration pneumonia, resolved   --Dysphagia diet per speech, eating well   --s/p cefepime, s/p rocephin (ended 12/10, 5 total days)    Hematuria, stable   Acute on chronic anemia   - Hemoglobin during admission has been stable: ~8-9, however it is notably down from 2 months ago with hemoglobin of ~11 across two checks on  and .   - Nursing noted hematuria in araujo, araujo removed and straight cath obtained, noted to have large blood in UA but without evidence of infection   - Hemoglobin stable over past few days, but is notably down from 2 weeks ago when she was closer to 11 on two separate checks   - Urology consulted, unlikely to be 2/2 bleeding from traumatic araujo insertion   - Obtain hemoccult, ordered  - Hold anticoagulation, SCDs in place  - 12/17 order iron profiile/ferritin, B12, folate, stool for hemoccult blood    Positive blood culture without bacteremia, resolved   - 1/2 bottles positive for coag-negative Staph, likely contaminant given no leukocytosis and Biofire positive for Staph but not MRSA or MSSA, suspect staph epi   - Repeat blood cultures pending, thus far no growth to date    Pressure wounds, stable   Wound, Pressure Prevention & Skin Care Recommendations:    Minimize layers of linen/pads under patient to optimize support surface. 2.  Turn/reposition approximately every 2 hours and offload heels. Patient mobility team to assist with q2 turns. 3.  Manage moisture/ Keep skin folds clean and dry/minimize brief usage. 4.  Specialty bed: [facility] air mattress ordered from [facility]. 5.  Red erythema to left hip, left foot, right knee and right great toe:  Apply Venelex TID and cover all areas with foam dressings. 6.  Left heel:  Every 3 days cleanse with saline; apply Xeroform and foam dressing. 7.  Right heel:  Protect with foam dressing. Acute on chronic hypotension, resolved  --Continue home midodrine    Psychomotor retardation  Seizure disorder  --Continue home depakote     Hypothyroidism   --Continue levothyroxine      Code status: DNR  DVT prophylaxis: SCDs  Care Plan discussed with: NICOLE  Anticipated Disposition: SNF/LTC  Anticipated Discharge:Medically ready, SNF pending     Hospital Problems  Date Reviewed: 11/29/2022            Codes Class Noted POA    Hospital-acquired pneumonia ICD-10-CM: J18.9, Y95  ICD-9-CM: 486  12/6/2022 Unknown       Review of Systems:   Review of systems not obtained due to patient factors. Vital Signs:    Last 24hrs VS reviewed since prior progress note.  Most recent are:  Visit Vitals  BP 106/64 (BP 1 Location: Right upper arm, BP Patient Position: At rest)   Pulse 78   Temp 97.5 °F (36.4 °C)   Resp 17   Ht 5' 4\" (1.626 m)   Wt 46.4 kg (102 lb 4.7 oz)   SpO2 98%   BMI 17.56 kg/m²         Intake/Output Summary (Last 24 hours) at 12/19/2022 1518  Last data filed at 12/18/2022 2138  Gross per 24 hour   Intake --   Output 300 ml   Net -300 ml              Physical Examination:     I had a face to face encounter with this patient and independently examined them on 12/19/2022 as outlined below:          General : alert, awake, non-verbal, no acute distress, global delays   HEENT: PEERL, EOMI, moist mucus membrane  Neck: supple, no JVD, no meningeal signs  Chest: Clear to auscultation bilaterally   CVS: S1 S2 heard, Capillary refill less than 2 seconds  Abd: soft/ Non tender, colostomy+  Ext: contracted  Neuro/Psych: pleasant mood and affect, moves in bed. Non verbal, doesn't follow commands      Data Review:    I personally reviewed  Image and labs      Labs:     No results for input(s): WBC, HGB, HCT, PLT, HGBEXT, HCTEXT, PLTEXT, HGBEXT, HCTEXT, PLTEXT in the last 72 hours. No results for input(s): NA, K, CL, CO2, BUN, CREA, GLU, CA, MG, PHOS, URICA in the last 72 hours. No results for input(s): ALT, AP, TBIL, TBILI, TP, ALB, GLOB, GGT, AML, LPSE in the last 72 hours. No lab exists for component: SGOT, GPT, AMYP, HLPSE    No results for input(s): INR, PTP, APTT, INREXT, INREXT in the last 72 hours. Recent Labs     12/18/22  0256   TIBC 638*   PSAT 16*   FERR 180        Lab Results   Component Value Date/Time    Folate 8.3 12/18/2022 02:56 AM        No results for input(s): PH, PCO2, PO2 in the last 72 hours. No results for input(s): CPK, CKNDX, TROIQ in the last 72 hours.     No lab exists for component: CPKMB  Lab Results   Component Value Date/Time    Cholesterol, total 208 (H) 02/14/2022 10:30 AM    HDL Cholesterol 91 02/14/2022 10:30 AM    LDL, calculated 96 02/14/2022 10:30 AM Triglyceride 121 02/14/2022 10:30 AM     Lab Results   Component Value Date/Time    Glucose (POC) 102 12/18/2022 11:51 AM    Glucose (POC) 100 12/12/2022 11:53 AM    Glucose (POC) 82 12/12/2022 06:21 AM    Glucose (POC) 119 (H) 12/11/2022 04:49 PM    Glucose (POC) 89 12/11/2022 11:33 AM     Lab Results   Component Value Date/Time    Color YELLOW/STRAW 12/10/2022 06:02 PM    Appearance CLEAR 12/10/2022 06:02 PM    Specific gravity 1.020 12/10/2022 06:02 PM    Specific gravity 1.030 12/05/2022 09:21 PM    pH (UA) 7.5 12/10/2022 06:02 PM    Protein 30 (A) 12/10/2022 06:02 PM    Glucose Negative 12/10/2022 06:02 PM    Ketone Negative 12/10/2022 06:02 PM    Bilirubin Negative 12/10/2022 06:02 PM    Urobilinogen 0.2 12/10/2022 06:02 PM    Nitrites Negative 12/10/2022 06:02 PM    Leukocyte Esterase TRACE (A) 12/10/2022 06:02 PM    Epithelial cells FEW 12/10/2022 06:02 PM    Bacteria Negative 12/10/2022 06:02 PM    WBC 0-4 12/10/2022 06:02 PM    RBC >100 (H) 12/10/2022 06:02 PM         Medications Reviewed:     Current Facility-Administered Medications   Medication Dose Route Frequency    neomycin-polymyxin-gramicidin (NEOSPORIN) 1.75 mg-10,000 unit-0.025mg/mL ophthalmic drops drop 1 Drop  1 Drop Left Eye TID    divalproex DR (DEPAKOTE) tablet 250 mg  250 mg Oral TID    dextrose 10 % infusion 125-250 mL  125-250 mL IntraVENous PRN    gabapentin (NEURONTIN) tablet 1,200 mg  1,200 mg Oral TID    midodrine (PROAMATINE) tablet 10 mg  10 mg Oral ACB&D    balsam peru-castor oiL (VENELEX) ointment   Topical Q8H    sodium chloride (NS) flush 5-40 mL  5-40 mL IntraVENous Q8H    sodium chloride (NS) flush 5-40 mL  5-40 mL IntraVENous PRN    acetaminophen (TYLENOL) tablet 650 mg  650 mg Oral Q6H PRN    Or    acetaminophen (TYLENOL) suppository 650 mg  650 mg Rectal Q6H PRN    polyethylene glycol (MIRALAX) packet 17 g  17 g Oral DAILY PRN    ondansetron (ZOFRAN ODT) tablet 4 mg  4 mg Oral Q8H PRN    Or    ondansetron (ZOFRAN) injection 4 mg  4 mg IntraVENous Q6H PRN    bisacodyL (DULCOLAX) suppository 10 mg  10 mg Rectal DAILY PRN    guaiFENesin-dextromethorphan (ROBITUSSIN DM) 100-10 mg/5 mL syrup 5 mL  5 mL Oral Q6H PRN    diazePAM (VALIUM) tablet 5 mg  5 mg Oral Q6H PRN    hydrOXYzine HCL (ATARAX) tablet 10 mg  10 mg Oral Q6H PRN    levothyroxine (SYNTHROID) tablet 50 mcg  50 mcg Oral ACB    melatonin tablet 3 mg  3 mg Oral QHS PRN    polyethylene glycol (MIRALAX) packet 17 g  17 g Oral DAILY    albuterol-ipratropium (DUO-NEB) 2.5 MG-0.5 MG/3 ML  3 mL Nebulization Q4H PRN     ______________________________________________________________________  EXPECTED LENGTH OF STAY: 3d 0h  ACTUAL LENGTH OF STAY:          13      Please note that this dictation was completed with [employer], the computer voice recognition software. Quite often unanticipated grammatical, syntax, homophones, and other interpretive errors are inadvertently transcribed by the computer software. Please disregard these errors. Please excuse any errors that have escaped final proofreading.                 [address] [facility], DO

## 2022-12-19 NOTE — PROGRESS NOTES
Problem: Falls - Risk of  Goal: *Absence of Falls  Description: Document Ovidio Arriaza Fall Risk and appropriate interventions in the flowsheet. Outcome: Progressing Towards Goal  Note: Fall Risk Interventions:       Mentation Interventions: Door open when patient unattended    Medication Interventions: Evaluate medications/consider consulting pharmacy, Patient to call before getting OOB    Elimination Interventions: Call light in reach, Patient to call for help with toileting needs              Problem: Patient Education: Go to Patient Education Activity  Goal: Patient/Family Education  Outcome: Progressing Towards Goal     Problem: Hypotension  Goal: *Blood pressure within specified parameters  Outcome: Progressing Towards Goal  Goal: *Fluid volume balance  Outcome: Progressing Towards Goal  Goal: *Labs within defined limits  Outcome: Progressing Towards Goal     Problem: Patient Education: Go to Patient Education Activity  Goal: Patient/Family Education  Outcome: Progressing Towards Goal     Problem: Pressure Injury - Risk of  Goal: *Prevention of pressure injury  Description: Document Vazquez Scale and appropriate interventions in the flowsheet.   Outcome: Progressing Towards Goal  Note: Pressure Injury Interventions:  Sensory Interventions: Assess changes in LOC    Moisture Interventions: Absorbent underpads    Activity Interventions: Pressure redistribution bed/mattress(bed type)    Mobility Interventions: Assess need for specialty bed    Nutrition Interventions: Document food/fluid/supplement intake    Friction and Shear Interventions: Apply protective barrier, creams and emollients                Problem: Patient Education: Go to Patient Education Activity  Goal: Patient/Family Education  Outcome: Progressing Towards Goal     Problem: Patient Education: Go to Patient Education Activity  Goal: Patient/Family Education  Outcome: Progressing Towards Goal

## 2022-12-20 PROCEDURE — 74011250637 HC RX REV CODE- 250/637: Performed by: STUDENT IN AN ORGANIZED HEALTH CARE EDUCATION/TRAINING PROGRAM

## 2022-12-20 PROCEDURE — 74011000250 HC RX REV CODE- 250: Performed by: NURSE PRACTITIONER

## 2022-12-20 PROCEDURE — 74011250637 HC RX REV CODE- 250/637: Performed by: NURSE PRACTITIONER

## 2022-12-20 PROCEDURE — 74011250637 HC RX REV CODE- 250/637: Performed by: HOSPITALIST

## 2022-12-20 PROCEDURE — 65270000029 HC RM PRIVATE

## 2022-12-20 RX ADMIN — DIVALPROEX SODIUM 250 MG: 250 TABLET, DELAYED RELEASE ORAL at 21:26

## 2022-12-20 RX ADMIN — GABAPENTIN 1200 MG: 600 TABLET, FILM COATED ORAL at 21:26

## 2022-12-20 RX ADMIN — GABAPENTIN 1200 MG: 600 TABLET, FILM COATED ORAL at 10:32

## 2022-12-20 RX ADMIN — MIDODRINE HYDROCHLORIDE 10 MG: 5 TABLET ORAL at 07:09

## 2022-12-20 RX ADMIN — NEOMYCIN SULFATE, POLYMYXIN B SULFATE AND GRAMICIDIN 1 DROP: 1.75; 10000; .025 SOLUTION/ DROPS OPHTHALMIC at 16:21

## 2022-12-20 RX ADMIN — SODIUM CHLORIDE, PRESERVATIVE FREE 10 ML: 5 INJECTION INTRAVENOUS at 21:25

## 2022-12-20 RX ADMIN — SODIUM CHLORIDE, PRESERVATIVE FREE 10 ML: 5 INJECTION INTRAVENOUS at 16:21

## 2022-12-20 RX ADMIN — Medication: at 16:20

## 2022-12-20 RX ADMIN — DIVALPROEX SODIUM 250 MG: 250 TABLET, DELAYED RELEASE ORAL at 10:32

## 2022-12-20 RX ADMIN — DIVALPROEX SODIUM 250 MG: 250 TABLET, DELAYED RELEASE ORAL at 16:20

## 2022-12-20 RX ADMIN — SODIUM CHLORIDE, PRESERVATIVE FREE 10 ML: 5 INJECTION INTRAVENOUS at 07:10

## 2022-12-20 RX ADMIN — GABAPENTIN 1200 MG: 600 TABLET, FILM COATED ORAL at 16:20

## 2022-12-20 RX ADMIN — Medication: at 21:26

## 2022-12-20 RX ADMIN — Medication: at 07:12

## 2022-12-20 RX ADMIN — LEVOTHYROXINE SODIUM 50 MCG: 0.05 TABLET ORAL at 07:09

## 2022-12-20 RX ADMIN — NEOMYCIN SULFATE, POLYMYXIN B SULFATE AND GRAMICIDIN 1 DROP: 1.75; 10000; .025 SOLUTION/ DROPS OPHTHALMIC at 21:25

## 2022-12-20 RX ADMIN — MIDODRINE HYDROCHLORIDE 10 MG: 5 TABLET ORAL at 16:20

## 2022-12-20 RX ADMIN — NEOMYCIN SULFATE, POLYMYXIN B SULFATE AND GRAMICIDIN 1 DROP: 1.75; 10000; .025 SOLUTION/ DROPS OPHTHALMIC at 10:33

## 2022-12-20 RX ADMIN — Medication: at 10:32

## 2022-12-20 NOTE — PROGRESS NOTES
[address] [facility] Adult  Hospitalist Group                                                                                          Hospitalist Progress Note  Velma Gonzalez DO  Answering service: [phone] from in house phone        Date of Service:  2022  NAME:  Carla Brock  :  1965  MRN:  [id number]      Admission Summary:   Carla Brock is a 56 y.o. 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 Patient seen and examined. Sitting in bed. Playing with magazine. No acute events. Assessment & Plan:     Her family and outside  feel that she has regressed from her baseline function and would benefit from SNF. SNF placement has been initiated by case management.      Aspiration pneumonia, resolved   --Dysphagia diet per speech, eating well   --s/p cefepime, s/p rocephin (ended 12/10, 5 total days)    Hematuria, stable   Acute on chronic anemia   - Hemoglobin during admission has been stable: ~8-9, however it is notably down from 2 months ago with hemoglobin of ~11 across two checks on  and .   - Nursing noted hematuria in araujo, araujo removed and straight cath obtained, noted to have large blood in UA but without evidence of infection   - Hemoglobin stable over past few days, but is notably down from 2 weeks ago when she was closer to 11 on two separate checks   - Urology consulted, unlikely to be 2/2 bleeding from traumatic araujo insertion   - Obtain hemoccult, ordered  - Hold anticoagulation, SCDs in place  - 12/17 order iron profiile/ferritin, B12, folate, stool for hemoccult blood    Positive blood culture without bacteremia, resolved   - 1/2 bottles positive for coag-negative Staph, likely contaminant given no leukocytosis and Biofire positive for Staph but not MRSA or MSSA, suspect staph epi   - Repeat blood cultures pending, thus far no growth to date    Pressure wounds, stable   Wound, Pressure Prevention & Skin Care Recommendations:    Minimize layers of linen/pads under patient to optimize support surface. 2.  Turn/reposition approximately every 2 hours and offload heels. Patient mobility team to assist with q2 turns. 3.  Manage moisture/ Keep skin folds clean and dry/minimize brief usage. 4.  Specialty bed: [facility] air mattress ordered from [facility]. 5.  Red erythema to left hip, left foot, right knee and right great toe:  Apply Venelex TID and cover all areas with foam dressings. 6.  Left heel:  Every 3 days cleanse with saline; apply Xeroform and foam dressing. 7.  Right heel:  Protect with foam dressing. Acute on chronic hypotension, resolved  --Continue home midodrine    Psychomotor retardation  Seizure disorder  --Continue home depakote     Hypothyroidism   --Continue levothyroxine      Code status: DNR  DVT prophylaxis: SCDs  Care Plan discussed with: NICOLE  Anticipated Disposition: SNF/LTC  Anticipated Discharge:Medically ready, SNF pending     Hospital Problems  Date Reviewed: 11/29/2022            Codes Class Noted POA    Hospital-acquired pneumonia ICD-10-CM: J18.9, Y95  ICD-9-CM: 486  12/6/2022 Unknown       Review of Systems:   Review of systems not obtained due to patient factors. Vital Signs:    Last 24hrs VS reviewed since prior progress note.  Most recent are:  Visit Vitals  BP 124/62 (BP 1 Location: Right upper arm, BP Patient Position: At rest)   Pulse 77   Temp 97.3 °F (36.3 °C)   Resp 18   Ht 5' 4\" (1.626 m)   Wt 46.4 kg (102 lb 4.7 oz)   SpO2 98%   BMI 17.56 kg/m²       No intake or output data in the 24 hours ending 12/20/22 1105           Physical Examination:     I had a face to face encounter with this patient and independently examined them on 12/20/2022 as outlined below:          General : alert, awake, non-verbal, no acute distress, global delays   HEENT: PEERL, EOMI, moist mucus membrane  Neck: supple, no JVD, no meningeal signs  Chest: Clear to auscultation bilaterally   CVS: S1 S2 heard, Capillary refill less than 2 seconds  Abd: soft/ Non tender, colostomy+  Ext: contracted  Neuro/Psych: pleasant mood and affect, moves in bed. Non verbal, doesn't follow commands      Data Review:    I personally reviewed  Image and labs      Labs:     No results for input(s): WBC, HGB, HCT, PLT, HGBEXT, HCTEXT, PLTEXT, HGBEXT, HCTEXT, PLTEXT in the last 72 hours. No results for input(s): NA, K, CL, CO2, BUN, CREA, GLU, CA, MG, PHOS, URICA in the last 72 hours. No results for input(s): ALT, AP, TBIL, TBILI, TP, ALB, GLOB, GGT, AML, LPSE in the last 72 hours. No lab exists for component: SGOT, GPT, AMYP, HLPSE    No results for input(s): INR, PTP, APTT, INREXT, INREXT in the last 72 hours. Recent Labs     12/18/22  0256   TIBC 638*   PSAT 16*   FERR 180        Lab Results   Component Value Date/Time    Folate 8.3 12/18/2022 02:56 AM        No results for input(s): PH, PCO2, PO2 in the last 72 hours. No results for input(s): CPK, CKNDX, TROIQ in the last 72 hours.     No lab exists for component: CPKMB  Lab Results   Component Value Date/Time    Cholesterol, total 208 (H) 02/14/2022 10:30 AM    HDL Cholesterol 91 02/14/2022 10:30 AM    LDL, calculated 96 02/14/2022 10:30 AM    Triglyceride 121 02/14/2022 10:30 AM     Lab Results   Component Value Date/Time    Glucose (POC) 102 12/18/2022 11:51 AM    Glucose (POC) 100 12/12/2022 11:53 AM    Glucose (POC) 82 12/12/2022 06:21 AM    Glucose (POC) 119 (H) 12/11/2022 04:49 PM    Glucose (POC) 89 12/11/2022 11:33 AM     Lab Results   Component Value Date/Time    Color YELLOW/STRAW 12/10/2022 06:02 PM    Appearance CLEAR 12/10/2022 06:02 PM    Specific gravity 1.020 12/10/2022 06:02 PM    Specific gravity 1.030 12/05/2022 09:21 PM    pH (UA) 7.5 12/10/2022 06:02 PM    Protein 30 (A) 12/10/2022 06:02 PM    Glucose Negative 12/10/2022 06:02 PM    Ketone Negative 12/10/2022 06:02 PM    Bilirubin Negative 12/10/2022 06:02 PM    Urobilinogen 0.2 12/10/2022 06:02 PM    Nitrites Negative 12/10/2022 06:02 PM    Leukocyte Esterase TRACE (A) 12/10/2022 06:02 PM    Epithelial cells FEW 12/10/2022 06:02 PM    Bacteria Negative 12/10/2022 06:02 PM    WBC 0-4 12/10/2022 06:02 PM    RBC >100 (H) 12/10/2022 06:02 PM         Medications Reviewed:     Current Facility-Administered Medications   Medication Dose Route Frequency    neomycin-polymyxin-gramicidin (NEOSPORIN) 1.75 mg-10,000 unit-0.025mg/mL ophthalmic drops drop 1 Drop  1 Drop Left Eye TID    divalproex DR (DEPAKOTE) tablet 250 mg  250 mg Oral TID    dextrose 10 % infusion 125-250 mL  125-250 mL IntraVENous PRN    gabapentin (NEURONTIN) tablet 1,200 mg  1,200 mg Oral TID    midodrine (PROAMATINE) tablet 10 mg  10 mg Oral ACB&D    balsam peru-castor oiL (VENELEX) ointment   Topical Q8H    sodium chloride (NS) flush 5-40 mL  5-40 mL IntraVENous Q8H    sodium chloride (NS) flush 5-40 mL  5-40 mL IntraVENous PRN    acetaminophen (TYLENOL) tablet 650 mg  650 mg Oral Q6H PRN    Or    acetaminophen (TYLENOL) suppository 650 mg  650 mg Rectal Q6H PRN    polyethylene glycol (MIRALAX) packet 17 g  17 g Oral DAILY PRN    ondansetron (ZOFRAN ODT) tablet 4 mg  4 mg Oral Q8H PRN    Or    ondansetron (ZOFRAN) injection 4 mg  4 mg IntraVENous Q6H PRN    bisacodyL (DULCOLAX) suppository 10 mg  10 mg Rectal DAILY PRN guaiFENesin-dextromethorphan (ROBITUSSIN DM) 100-10 mg/5 mL syrup 5 mL  5 mL Oral Q6H PRN    diazePAM (VALIUM) tablet 5 mg  5 mg Oral Q6H PRN    hydrOXYzine HCL (ATARAX) tablet 10 mg  10 mg Oral Q6H PRN    levothyroxine (SYNTHROID) tablet 50 mcg  50 mcg Oral ACB    melatonin tablet 3 mg  3 mg Oral QHS PRN    polyethylene glycol (MIRALAX) packet 17 g  17 g Oral DAILY    albuterol-ipratropium (DUO-NEB) 2.5 MG-0.5 MG/3 ML  3 mL Nebulization Q4H PRN     ______________________________________________________________________  EXPECTED LENGTH OF STAY: 3d 0h  ACTUAL LENGTH OF STAY:          14      Please note that this dictation was completed with [employer], the computer voice recognition software. Quite often unanticipated grammatical, syntax, homophones, and other interpretive errors are inadvertently transcribed by the computer software. Please disregard these errors. Please excuse any errors that have escaped final proofreading.                 [address], DO

## 2022-12-20 NOTE — PROGRESS NOTES
Occupational Therapy    Chart reviewed. Note patient has severe cognitive impairment at baseline and required total assistance for ADLs PTA. Note patient has been unable to follow commands or participate therapeutically in last PT and OT attempts. Consulted with RN, who confirmed patient is still not following commands. Further OT is not indicated. Will complete OT order.     Kassidy Teresa OTR/L

## 2022-12-21 PROCEDURE — 65270000029 HC RM PRIVATE

## 2022-12-21 PROCEDURE — 74011000250 HC RX REV CODE- 250: Performed by: NURSE PRACTITIONER

## 2022-12-21 PROCEDURE — 74011250637 HC RX REV CODE- 250/637: Performed by: NURSE PRACTITIONER

## 2022-12-21 PROCEDURE — 74011250637 HC RX REV CODE- 250/637: Performed by: STUDENT IN AN ORGANIZED HEALTH CARE EDUCATION/TRAINING PROGRAM

## 2022-12-21 PROCEDURE — 74011250637 HC RX REV CODE- 250/637: Performed by: HOSPITALIST

## 2022-12-21 RX ADMIN — NEOMYCIN SULFATE, POLYMYXIN B SULFATE AND GRAMICIDIN 1 DROP: 1.75; 10000; .025 SOLUTION/ DROPS OPHTHALMIC at 22:53

## 2022-12-21 RX ADMIN — DIVALPROEX SODIUM 250 MG: 250 TABLET, DELAYED RELEASE ORAL at 09:26

## 2022-12-21 RX ADMIN — DIVALPROEX SODIUM 250 MG: 250 TABLET, DELAYED RELEASE ORAL at 16:45

## 2022-12-21 RX ADMIN — GABAPENTIN 1200 MG: 600 TABLET, FILM COATED ORAL at 16:45

## 2022-12-21 RX ADMIN — GABAPENTIN 1200 MG: 600 TABLET, FILM COATED ORAL at 09:26

## 2022-12-21 RX ADMIN — SODIUM CHLORIDE, PRESERVATIVE FREE 10 ML: 5 INJECTION INTRAVENOUS at 22:53

## 2022-12-21 RX ADMIN — NEOMYCIN SULFATE, POLYMYXIN B SULFATE AND GRAMICIDIN 1 DROP: 1.75; 10000; .025 SOLUTION/ DROPS OPHTHALMIC at 09:27

## 2022-12-21 RX ADMIN — LEVOTHYROXINE SODIUM 50 MCG: 0.05 TABLET ORAL at 06:31

## 2022-12-21 RX ADMIN — Medication: at 22:53

## 2022-12-21 RX ADMIN — SODIUM CHLORIDE, PRESERVATIVE FREE 10 ML: 5 INJECTION INTRAVENOUS at 05:50

## 2022-12-21 RX ADMIN — MIDODRINE HYDROCHLORIDE 10 MG: 5 TABLET ORAL at 06:31

## 2022-12-21 RX ADMIN — SODIUM CHLORIDE, PRESERVATIVE FREE 10 ML: 5 INJECTION INTRAVENOUS at 14:23

## 2022-12-21 RX ADMIN — POLYETHYLENE GLYCOL 3350 17 G: 17 POWDER, FOR SOLUTION ORAL at 09:27

## 2022-12-21 RX ADMIN — Medication: at 14:24

## 2022-12-21 RX ADMIN — DIVALPROEX SODIUM 250 MG: 250 TABLET, DELAYED RELEASE ORAL at 22:52

## 2022-12-21 RX ADMIN — NEOMYCIN SULFATE, POLYMYXIN B SULFATE AND GRAMICIDIN 1 DROP: 1.75; 10000; .025 SOLUTION/ DROPS OPHTHALMIC at 16:45

## 2022-12-21 RX ADMIN — MIDODRINE HYDROCHLORIDE 10 MG: 5 TABLET ORAL at 16:45

## 2022-12-21 RX ADMIN — Medication: at 05:50

## 2022-12-21 RX ADMIN — GABAPENTIN 1200 MG: 600 TABLET, FILM COATED ORAL at 22:52

## 2022-12-21 NOTE — PROGRESS NOTES
Transitions of care:  Expected discharge to skilled nursing facility and then transition to LTC once Level 2 is resulted and forms received here for nursing home placement. Assessment was completed on 12/14 and submitted, awaiting paperwork from [facility]. [phone] to provide updates regarding discharge planning for SNF. Had to leave a message. Delay remains the level 2 completion forms. [facility] and [address] indicated yes once level 2 resulted (spoke with Kelley Steve admissions at [address] [facility] today) but need to confirm choice based on availability with dad once discharge date known.      Carlos Paige 54   [phone]

## 2022-12-21 NOTE — PROGRESS NOTES
[place]:  Discharge to [facility], transport with [facility] requested for 11am on Thursday 12/22 pending review of plans with pt's decision maker, father, Mr. Barbara Baron. Level 2 paperwork has resulted and sent as attachment to [facility] ([phone]) via [employer]. Confirmed receipt with Ernie Tse, admissions who confirmed they can accept pt into a SNF bed tomorrow 12/22 and aware it will likely also turn into LTC. CM has called pt's dad Rhonda Flower, phone [phone] and left message today to review plans. Awaiting call back. 4pm  Reached out again to pt's dad, Mr. Barbara Baron, left another message requesting call back.       Carlos Morales 52   [phone]

## 2022-12-21 NOTE — PROGRESS NOTES
[facility] [facility] Adult  Hospitalist Group                                                                                          Hospitalist Progress Note  [address] [facility],   Answering service: [phone] from in house phone        Date of Service:  2022  NAME:  Janae Yip  :  1965  MRN:  [id number]      Admission Summary:   Janae Yip is a 56 y.o. 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, hypothyroidism, chronic hypotension on midodrine, bedbound nonverbal came to the ER from a group home to [facility] with concerns for aspiration pneumonia, patient had an episode of vomiting yesterday, decreased mental status today, had a chest x-ray, concern for multifocal pneumonia, admitted to [facility], became hypotensive, put on Levophed, admitted by ICU team and then transferred to the hospitalist team for further management and evaluation, no further history could be obtained from the patient    Interval history / Subjective: Follow up aspiration PNA. Patient seen and examined. Taking a nap during my encounter. Did not awaken patient. Assessment & Plan:     Her family and outside  feel that she has regressed from her baseline function and would benefit from SNF. SNF placement has been initiated by case management.      Aspiration pneumonia, resolved   --Dysphagia diet per speech, eating well   --s/p cefepime, s/p rocephin (ended 12/10, 5 total days)    Hematuria, stable   Acute on chronic anemia   - Hemoglobin during admission has been stable: ~8-9, however it is notably down from 2 months ago with hemoglobin of ~11 across two checks on  and .   - Nursing noted hematuria in araujo, araujo removed and straight cath obtained, noted to have large blood in UA but without evidence of infection   - Hemoglobin stable over past few days, but is notably down from 2 weeks ago when she was closer to 11 on two separate checks   - Urology consulted, unlikely to be 2/2 bleeding from traumatic araujo insertion   - Obtain hemoccult, ordered  - Hold anticoagulation, SCDs in place  - 12/17 order iron profiile/ferritin, B12, folate, stool for hemoccult blood    Positive blood culture without bacteremia, resolved   - 1/2 bottles positive for coag-negative Staph, likely contaminant given no leukocytosis and Biofire positive for Staph but not MRSA or MSSA, suspect staph epi   - Repeat blood cultures pending, thus far no growth to date    Pressure wounds, stable   Wound, Pressure Prevention & Skin Care Recommendations:    Minimize layers of linen/pads under patient to optimize support surface. 2.  Turn/reposition approximately every 2 hours and offload heels. Patient mobility team to assist with q2 turns. 3.  Manage moisture/ Keep skin folds clean and dry/minimize brief usage. 4.  Specialty bed: [facility] air mattress ordered from [facility]. 5.  Red erythema to left hip, left foot, right knee and right great toe:  Apply Venelex TID and cover all areas with foam dressings. 6.  Left heel:  Every 3 days cleanse with saline; apply Xeroform and foam dressing. 7.  Right heel:  Protect with foam dressing. Acute on chronic hypotension, resolved  --Continue home midodrine    Psychomotor retardation  Seizure disorder  --Continue home depakote     Hypothyroidism   --Continue levothyroxine      Code status: DNR  DVT prophylaxis: SCDs  Care Plan discussed with: NICOLE  Anticipated Disposition: SNF/LTC  Anticipated Discharge:Medically ready, SNF pending     Hospital Problems  Date Reviewed: 11/29/2022            Codes Class Noted POA    Hospital-acquired pneumonia ICD-10-CM: J18.9, Y95  ICD-9-CM: 486  12/6/2022 Unknown       Review of Systems:   Review of systems not obtained due to patient factors. Vital Signs:    Last 24hrs VS reviewed since prior progress note.  Most recent are:  Visit Vitals  BP (!) 104/53 (BP 1 Location: Left upper arm, BP Patient Position: At rest)   Pulse 77   Temp 97.3 °F (36.3 °C)   Resp 18   Ht 5' 4\" (1.626 m)   Wt 47.2 kg (104 lb 0.9 oz)   SpO2 98%   BMI 17.86 kg/m²       No intake or output data in the 24 hours ending 12/21/22 1111           Physical Examination:     I had a face to face encounter with this patient and independently examined them on 12/21/2022 as outlined below:          General : sleeping    HEENT: PEERL, EOMI, moist mucus membrane  Neck: supple, no JVD, no meningeal signs  Chest: Clear to auscultation bilaterally   CVS: S1 S2 heard, Capillary refill less than 2 seconds  Abd: soft/ Non tender, colostomy+  Ext: contracted  Neuro/Psych: . Non verbal, doesn't follow commands      Data Review:    I personally reviewed  Image and labs      Labs:     No results for input(s): WBC, HGB, HCT, PLT, HGBEXT, HCTEXT, PLTEXT, HGBEXT, HCTEXT, PLTEXT in the last 72 hours. No results for input(s): NA, K, CL, CO2, BUN, CREA, GLU, CA, MG, PHOS, URICA in the last 72 hours. No results for input(s): ALT, AP, TBIL, TBILI, TP, ALB, GLOB, GGT, AML, LPSE in the last 72 hours. No lab exists for component: SGOT, GPT, AMYP, HLPSE    No results for input(s): INR, PTP, APTT, INREXT, INREXT in the last 72 hours. No results for input(s): FE, TIBC, PSAT, FERR in the last 72 hours. Lab Results   Component Value Date/Time    Folate 8.3 12/18/2022 02:56 AM        No results for input(s): PH, PCO2, PO2 in the last 72 hours. No results for input(s): CPK, CKNDX, TROIQ in the last 72 hours.     No lab exists for component: CPKMB  Lab Results   Component Value Date/Time    Cholesterol, total 208 (H) 02/14/2022 10:30 AM    HDL Cholesterol 91 02/14/2022 10:30 AM    LDL, calculated 96 02/14/2022 10:30 AM    Triglyceride 121 02/14/2022 10:30 AM     Lab Results   Component Value Date/Time    Glucose (POC) 102 12/18/2022 11:51 AM    Glucose (POC) 100 12/12/2022 11:53 AM    Glucose (POC) 82 12/12/2022 06:21 AM    Glucose (POC) 119 (H) 12/11/2022 04:49 PM    Glucose (POC) 89 12/11/2022 11:33 AM     Lab Results   Component Value Date/Time    Color YELLOW/STRAW 12/10/2022 06:02 PM    Appearance CLEAR 12/10/2022 06:02 PM    Specific gravity 1.020 12/10/2022 06:02 PM    Specific gravity 1.030 12/05/2022 09:21 PM    pH (UA) 7.5 12/10/2022 06:02 PM    Protein 30 (A) 12/10/2022 06:02 PM    Glucose Negative 12/10/2022 06:02 PM    Ketone Negative 12/10/2022 06:02 PM    Bilirubin Negative 12/10/2022 06:02 PM    Urobilinogen 0.2 12/10/2022 06:02 PM    Nitrites Negative 12/10/2022 06:02 PM    Leukocyte Esterase TRACE (A) 12/10/2022 06:02 PM    Epithelial cells FEW 12/10/2022 06:02 PM    Bacteria Negative 12/10/2022 06:02 PM    WBC 0-4 12/10/2022 06:02 PM    RBC >100 (H) 12/10/2022 06:02 PM         Medications Reviewed:     Current Facility-Administered Medications   Medication Dose Route Frequency    neomycin-polymyxin-gramicidin (NEOSPORIN) 1.75 mg-10,000 unit-0.025mg/mL ophthalmic drops drop 1 Drop  1 Drop Left Eye TID    divalproex DR (DEPAKOTE) tablet 250 mg  250 mg Oral TID    dextrose 10 % infusion 125-250 mL  125-250 mL IntraVENous PRN    gabapentin (NEURONTIN) tablet 1,200 mg  1,200 mg Oral TID    midodrine (PROAMATINE) tablet 10 mg  10 mg Oral ACB&D    balsam peru-castor oiL (VENELEX) ointment   Topical Q8H    sodium chloride (NS) flush 5-40 mL  5-40 mL IntraVENous Q8H    sodium chloride (NS) flush 5-40 mL  5-40 mL IntraVENous PRN    acetaminophen (TYLENOL) tablet 650 mg  650 mg Oral Q6H PRN    Or    acetaminophen (TYLENOL) suppository 650 mg  650 mg Rectal Q6H PRN    polyethylene glycol (MIRALAX) packet 17 g  17 g Oral DAILY PRN    ondansetron (ZOFRAN ODT) tablet 4 mg  4 mg Oral Q8H PRN    Or    ondansetron (ZOFRAN) injection 4 mg  4 mg IntraVENous Q6H PRN    bisacodyL (DULCOLAX) suppository 10 mg  10 mg Rectal DAILY PRN    guaiFENesin-dextromethorphan (ROBITUSSIN DM) 100-10 mg/5 mL syrup 5 mL  5 mL Oral Q6H PRN    diazePAM (VALIUM) tablet 5 mg  5 mg Oral Q6H PRN    hydrOXYzine HCL (ATARAX) tablet 10 mg  10 mg Oral Q6H PRN    levothyroxine (SYNTHROID) tablet 50 mcg  50 mcg Oral ACB    melatonin tablet 3 mg  3 mg Oral QHS PRN    polyethylene glycol (MIRALAX) packet 17 g  17 g Oral DAILY    albuterol-ipratropium (DUO-NEB) 2.5 MG-0.5 MG/3 ML  3 mL Nebulization Q4H PRN     ______________________________________________________________________  EXPECTED LENGTH OF STAY: 3d 0h  ACTUAL LENGTH OF STAY:          15      Please note that this dictation was completed with [employer], the computer voice recognition software. Quite often unanticipated grammatical, syntax, homophones, and other interpretive errors are inadvertently transcribed by the computer software. Please disregard these errors. Please excuse any errors that have escaped final proofreading.                 [address], DO

## 2022-12-22 ENCOUNTER — HOSPITAL ENCOUNTER (EMERGENCY)
Age: 57
Discharge: NURSING, REHAB OR LONG-TERM CARE | End: 2022-12-23
Attending: EMERGENCY MEDICINE
Payer: MEDICARE

## 2022-12-22 VITALS
DIASTOLIC BLOOD PRESSURE: 83 MMHG | BODY MASS INDEX: 17.61 KG/M2 | WEIGHT: 103.17 LBS | OXYGEN SATURATION: 99 % | HEART RATE: 81 BPM | TEMPERATURE: 97.3 F | SYSTOLIC BLOOD PRESSURE: 126 MMHG | HEIGHT: 64 IN | RESPIRATION RATE: 16 BRPM

## 2022-12-22 DIAGNOSIS — R41.843: ICD-10-CM

## 2022-12-22 DIAGNOSIS — R62.7 FAILURE TO THRIVE IN ADULT: Primary | ICD-10-CM

## 2022-12-22 LAB
ANION GAP SERPL CALC-SCNC: 2 MMOL/L (ref 5–15)
BUN SERPL-MCNC: 49 MG/DL (ref 6–20)
BUN/CREAT SERPL: 65 (ref 12–20)
CALCIUM SERPL-MCNC: 9.3 MG/DL (ref 8.5–10.1)
CHLORIDE SERPL-SCNC: 104 MMOL/L (ref 97–108)
CO2 SERPL-SCNC: 34 MMOL/L (ref 21–32)
CREAT SERPL-MCNC: 0.75 MG/DL (ref 0.55–1.02)
ERYTHROCYTE [DISTWIDTH] IN BLOOD BY AUTOMATED COUNT: 15.9 % (ref 11.5–14.5)
GLUCOSE SERPL-MCNC: 118 MG/DL (ref 65–100)
HCT VFR BLD AUTO: 31.6 % (ref 35–47)
HGB BLD-MCNC: 9.6 G/DL (ref 11.5–16)
MCH RBC QN AUTO: 31.7 PG (ref 26–34)
MCHC RBC AUTO-ENTMCNC: 30.4 G/DL (ref 30–36.5)
MCV RBC AUTO: 104.3 FL (ref 80–99)
NRBC # BLD: 0 K/UL (ref 0–0.01)
NRBC BLD-RTO: 0 PER 100 WBC
PLATELET # BLD AUTO: 577 K/UL (ref 150–400)
PMV BLD AUTO: 9.8 FL (ref 8.9–12.9)
POTASSIUM SERPL-SCNC: 4.3 MMOL/L (ref 3.5–5.1)
RBC # BLD AUTO: 3.03 M/UL (ref 3.8–5.2)
SODIUM SERPL-SCNC: 140 MMOL/L (ref 136–145)
WBC # BLD AUTO: 11.1 K/UL (ref 3.6–11)

## 2022-12-22 PROCEDURE — 74011250637 HC RX REV CODE- 250/637: Performed by: HOSPITALIST

## 2022-12-22 PROCEDURE — 74011250637 HC RX REV CODE- 250/637: Performed by: NURSE PRACTITIONER

## 2022-12-22 PROCEDURE — 80048 BASIC METABOLIC PNL TOTAL CA: CPT

## 2022-12-22 PROCEDURE — 74011250637 HC RX REV CODE- 250/637: Performed by: STUDENT IN AN ORGANIZED HEALTH CARE EDUCATION/TRAINING PROGRAM

## 2022-12-22 PROCEDURE — 85027 COMPLETE CBC AUTOMATED: CPT

## 2022-12-22 PROCEDURE — 36415 COLL VENOUS BLD VENIPUNCTURE: CPT

## 2022-12-22 PROCEDURE — 99283 EMERGENCY DEPT VISIT LOW MDM: CPT

## 2022-12-22 PROCEDURE — 74011000250 HC RX REV CODE- 250: Performed by: NURSE PRACTITIONER

## 2022-12-22 RX ORDER — GABAPENTIN 600 MG/1
1200 TABLET ORAL 3 TIMES DAILY
Status: DISCONTINUED | OUTPATIENT
Start: 2022-12-22 | End: 2022-12-23 | Stop reason: HOSPADM

## 2022-12-22 RX ORDER — LANOLIN ALCOHOL/MO/W.PET/CERES
3 CREAM (GRAM) TOPICAL
Status: DISCONTINUED | OUTPATIENT
Start: 2022-12-22 | End: 2022-12-23 | Stop reason: HOSPADM

## 2022-12-22 RX ORDER — DOCUSATE SODIUM 100 MG/1
100 CAPSULE, LIQUID FILLED ORAL
Status: DISCONTINUED | OUTPATIENT
Start: 2022-12-22 | End: 2022-12-23 | Stop reason: HOSPADM

## 2022-12-22 RX ORDER — HYDROXYZINE HYDROCHLORIDE 10 MG/1
10 TABLET, FILM COATED ORAL
Status: DISCONTINUED | OUTPATIENT
Start: 2022-12-22 | End: 2022-12-23 | Stop reason: HOSPADM

## 2022-12-22 RX ORDER — POLYETHYLENE GLYCOL 3350 17 G/17G
17 POWDER, FOR SOLUTION ORAL DAILY
Status: DISCONTINUED | OUTPATIENT
Start: 2022-12-23 | End: 2022-12-23 | Stop reason: HOSPADM

## 2022-12-22 RX ORDER — GUAIFENESIN/DEXTROMETHORPHAN 100-10MG/5
5 SYRUP ORAL
Status: DISCONTINUED | OUTPATIENT
Start: 2022-12-22 | End: 2022-12-23 | Stop reason: HOSPADM

## 2022-12-22 RX ORDER — ACETAMINOPHEN 325 MG/1
325 TABLET ORAL
Status: DISCONTINUED | OUTPATIENT
Start: 2022-12-22 | End: 2022-12-23 | Stop reason: HOSPADM

## 2022-12-22 RX ORDER — LEVOTHYROXINE SODIUM 50 UG/1
50 TABLET ORAL
Status: DISCONTINUED | OUTPATIENT
Start: 2022-12-23 | End: 2022-12-23 | Stop reason: HOSPADM

## 2022-12-22 RX ORDER — DIVALPROEX SODIUM 250 MG/1
250 TABLET, DELAYED RELEASE ORAL 3 TIMES DAILY
Status: DISCONTINUED | OUTPATIENT
Start: 2022-12-22 | End: 2022-12-23 | Stop reason: HOSPADM

## 2022-12-22 RX ORDER — MIDODRINE HYDROCHLORIDE 5 MG/1
10 TABLET ORAL
Status: DISCONTINUED | OUTPATIENT
Start: 2022-12-23 | End: 2022-12-23 | Stop reason: HOSPADM

## 2022-12-22 RX ADMIN — Medication: at 05:41

## 2022-12-22 RX ADMIN — NEOMYCIN SULFATE, POLYMYXIN B SULFATE AND GRAMICIDIN 1 DROP: 1.75; 10000; .025 SOLUTION/ DROPS OPHTHALMIC at 08:38

## 2022-12-22 RX ADMIN — POLYETHYLENE GLYCOL 3350 17 G: 17 POWDER, FOR SOLUTION ORAL at 08:37

## 2022-12-22 RX ADMIN — LEVOTHYROXINE SODIUM 50 MCG: 0.05 TABLET ORAL at 06:32

## 2022-12-22 RX ADMIN — MIDODRINE HYDROCHLORIDE 10 MG: 5 TABLET ORAL at 06:32

## 2022-12-22 RX ADMIN — SODIUM CHLORIDE, PRESERVATIVE FREE 10 ML: 5 INJECTION INTRAVENOUS at 05:41

## 2022-12-22 RX ADMIN — Medication: at 14:50

## 2022-12-22 RX ADMIN — GABAPENTIN 1200 MG: 600 TABLET, FILM COATED ORAL at 08:37

## 2022-12-22 RX ADMIN — DIVALPROEX SODIUM 250 MG: 250 TABLET, DELAYED RELEASE ORAL at 08:38

## 2022-12-22 NOTE — PROGRESS NOTES
CM has attempted to reach pt's dad and decision maker multiple times yesterday, Yusef Croft, phone [phone] and have called again this morning. Have left message requesting call back today. Currently discharge is set up for 11am but need to review with father, LIBBY. Have now placed [facility] on will call. CM reached out to [facility] where Yusef Croft lives to inquire if they can assist with reaching Mr. Quarles Pietro. Unable to reach him on unit phone but have left written message.

## 2022-12-22 NOTE — PROGRESS NOTES
Care Management:    Transition of Care Plan:     RUR:  Disposition:  Transportation:         Received call from ED that [facility] brought patient back from the nursing facility that they had just transported patient to. Reviewed chart. Patient went to [facility]. [facility] crew was still on site. Spoke with Elkin Garcia. He said when they arrived to [facility], there were no rails on the bed and patient was a huge fall risk so they did not feel it was safe to leave patient. They spoke with their management and they were told they could leave the patient. Then they said the facility did not want to take the patient because she had to have rails and they sent her back to the hospital.     CM called [facility]. Patient was to go to room 902. Spoke with nurse Lauren. She said she was the one who was going to take the admission. When [facility] arrived, the told them that patient had to have bed rails. She explained the cannot have rails as they are a restraint. They said they could not leave her. She called her unit manager Rex Ugarte. Someone spoke with Garth Chavez in admission. The decision was made to send patient back to hospital.     CM spoke with patient's nurse from the unit he was just discharged from. She said patient was in a group home and was walking. She is currently not walking. She has huge blisters on her heels. Patient is mostly non-verbal. She will say her name. Patient is food motivated. The only time she tries to get out of bed is when there is food on the other side of the room. She does sit on her knees in the bed. Patient has a colostomy. Patient had mumps as a baby. She has to be fed. Updated charge RN. 20:50 Received call from RN caring for patient. Explained above. She said father called and wanted to speak to someone about what happened. CM will call him. 21:03 Called patient's father - Eda Hanks. Explained the above.      EMILIANO Akhtar

## 2022-12-22 NOTE — DISCHARGE SUMMARY
Discharge Instructions/Summary     Patient: China Pickard       MRN: [id number]       YOB: 1965       Age: 56 y.o. Date of admission:  12/6/2022    Date of discharge:  12/22/2022    Primary care provider:  Baltazar Lawrence MD     Admitting provider:  Елена Cronin MD    Discharging provider:  Fatuma Garner MD , to contact this individual call [phone] and ask the  to page, if unavailable ask for the triage hospitalist to be paged. Consultations  IP CONSULT TO UROLOGY    Procedures/Surgeries  * No surgery found *    Discharge destination: [address] [place]. The patient is stable for discharge.     Admission diagnosis  Hospital-acquired pneumonia [J18.9, [address] COURSE:    Per HPI:\"Daisy Greig Hatchet is a 56 y.o. 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, hypothyroidism, chronic hypotension on midodrine, bedbound nonverbal came to the ER from a group home to [facility] with concerns for aspiration pneumonia, patient had an episode of vomiting yesterday, decreased mental status today, had a chest x-ray, concern for multifocal pneumonia, admitted to [facility], became hypotensive, put on Levophed, admitted by ICU team and then transferred to the hospitalist team for further management and evaluation, no further history could be obtained from the patient\"     Aspiration pneumonia, resolved   --Dysphagia diet per speech, eating well   --s/p cefepime, s/p rocephin (ended 12/10, 5 total days)     Hematuria, stable   Acute on chronic anemia   - Hemoglobin during admission has been stable: ~8-9, however it is notably down from 2 months ago with hemoglobin of ~11 across two checks on 11/29 and 12/5.   - Nursing noted hematuria in araujo, araujo removed and straight cath obtained, noted to have large blood in UA but without evidence of infection   - Hemoglobin stable over past few days, but is notably down from 2 weeks ago when she was closer to 11 on two separate checks   - Urology consulted, unlikely to be 2/2 bleeding from traumatic araujo insertion   - Hold anticoagulation, SCDs in place  - 12/17 order iron profiile/ferritin, B12, folate, stool for hemoccult blood     Positive blood culture without bacteremia, resolved   - 1/2 bottles positive for coag-negative Staph, likely contaminant given no leukocytosis and Biofire positive for Staph but not MRSA or MSSA, suspect staph epi   - Repeat blood cultures pending, thus far no growth to date     Pressure wounds, stable   Wound, Pressure Prevention & Skin Care Recommendations:    Minimize layers of linen/pads under patient to optimize support surface. 2.  Turn/reposition approximately every 2 hours and offload heels. Patient mobility team to assist with q2 turns. 3.  Manage moisture/ Keep skin folds clean and dry/minimize brief usage. 4.  Specialty bed: [facility] air mattress ordered from [facility]. 5.  Red erythema to left hip, left foot, right knee and right great toe:  Apply Venelex TID and cover all areas with foam dressings. 6.  Left heel:  Every 3 days cleanse with saline; apply Xeroform and foam dressing. 7.  Right heel:  Protect with foam dressing. Acute on chronic hypotension, resolved  --Continue home midodrine    Psychomotor retardation  Seizure disorder  --Continue home depakote      Hypothyroidism   --Continue levothyroxine     Current Discharge Medication List        CONTINUE these medications which have CHANGED    Details   hydrOXYzine HCL (ATARAX) 10 mg tablet Take 1 Tablet by mouth every six (6) hours as needed for Itching for up to 10 days. Qty: 60 Tablet, Refills: 0  Start date: 12/8/2022, End date: 12/18/2022      melatonin 3 mg tablet Take 1 Tablet by mouth nightly as needed for Insomnia.   Qty: 30 Tablet, Refills: 0  Start date: 12/8/2022           CONTINUE these medications which have NOT CHANGED    Details   divalproex DR (DEPAKOTE) 250 mg tablet Take 250 mg by mouth three (3) times daily. levothyroxine (SYNTHROID) 50 mcg tablet Take 1 Tablet by mouth Daily (before breakfast). Qty: 90 Tablet, Refills: 4      midodrine (PROAMATINE) 10 mg tablet TAKE 1 TABLET BY MOUTH IN THE MORNING AND IN THE EVENING WITH BREAKFAST & DINNER  Qty: 60 Tablet, Refills: 11      acetaminophen (TylenoL) 325 mg tablet Take 1 Tab by mouth every six (6) hours as needed for Pain. Qty: 100 Tab, Refills: 11      dextromethorphan-guaiFENesin (Robitussin Cough-Chest Narendra DM) 5-100 mg/5 mL liqd Take 5 mL by mouth every four to six (4-6) hours as needed for Cough or Congestion. Qty: 1 Bottle, Refills: 11      multivitamin (ONE A DAY) tablet Take 1 Tab by mouth daily. Qty: 90 Tab, Refills: 3      gabapentin (NEURONTIN) 600 mg tablet Take 1,200 mg by mouth three (3) times daily. polyethylene glycol (MIRALAX) 17 gram packet Take 17 g by mouth daily. docusate sodium (COLACE) 100 mg capsule Take 100 mg by mouth two (2) times daily as needed for Constipation. STOP taking these medications       scopolamine (TRANSDERM-SCOP) 1 mg over 3 days pt3d Comments:   Reason for Stopping:         cefepime 2 gram 2 g IVPB Comments:   Reason for Stopping:         bisacodyL (DULCOLAX) 10 mg supp Comments:   Reason for Stopping:         diazePAM (VALIUM) 5 mg tablet Comments:   Reason for Stopping:                 FOLLOW-UP CARE RECOMMENDATIONS/TESTING/NURSING ORDERS:  PT/OT      DIET:  Dysphagia diet-pureed    ACTIVITY:  Activity as tolerated, PT/OT to evaluate and treat, and OOB for meals    APPOINTMENTS:  Follow-up with primary care provider, Dr. Sabiha Verdugo MD  -  Please call to set up an appointment to be seen in  1 week       PENDING TEST RESULTS:  At the time of discharge the following test results are still pending: none . Please review these results as they become available. Specific symptoms to watch for: chest pain, shortness of breath, fever, chills, nausea, vomiting, diarrhea, change in mentation, falling, weakness, bleeding.     GOALS OF CARE:  X  Eventual return to home/independent/assisted living     Long term SNF      Hospice     No rehospitalization     SOCIAL HISTORY:     Social History     Socioeconomic History    Marital status: UNKNOWN     Spouse name: Not on file    Number of children: Not on file    Years of education: Not on file    Highest education level: Not on file   Occupational History    Not on file   Tobacco Use    Smoking status: Never    Smokeless tobacco: Never   Substance and Sexual Activity    Alcohol use: Never    Drug use: Never    Sexual activity: Not on file   Other Topics Concern    Not on file   Social History Narrative    Not on file     Social Determinants of Health     Financial Resource Strain: Not on file   Food Insecurity: Not on file   Transportation Needs: Not on file   Physical Activity: Not on file   Stress: Not on file   Social Connections: Not on file   Intimate Partner Violence: Not on file   Housing Stability: Not on file       Patient condition at discharge:   Functional status    Poor    X  Deconditioned      Independent   Cognition    Lucid     Forgetful (some sensescence)     Dementia   Catheters/lines (plus indication)    Becker     PICC      PEG         Code status    Full code    X  DNR         CHRONIC MEDICAL CONDITIONS:  Problem List as of 12/22/2022 Date Reviewed: 11/29/2022            Codes Class Noted - Resolved    Hospital-acquired pneumonia ICD-10-CM: J18.9, Y95  ICD-9-CM: 486  12/6/2022 - Present        Pneumonia ICD-10-CM: J18.9  ICD-9-CM: 486  12/5/2022 - Present        [facility] ICD-10-CM: G40.909  ICD-9-CM: 345.90  Unknown - Present        Hypotension ICD-10-CM: I95.9  ICD-9-CM: 458.9  Unknown - Present        Constipation ICD-10-CM: K59.00  ICD-9-CM: 564.00  Unknown - Present        Psychomotor retardation ICD-10-CM: R41.843  ICD-9-CM: 799.54  Unknown - Present               Physical examination at discharge  Visit Vitals  /62 (BP 1 Location: Right upper arm, BP Patient Position: At rest)   Pulse 84   Temp (!) 96 °F (35.6 °C)   Resp 16   Ht 5' 4\" (1.626 m)   Wt 46.8 kg (103 lb 2.8 oz)   SpO2 94%   BMI 17.71 kg/m²     General : Alert    HEENT: PEERL, EOMI, moist mucus membrane  Neck: supple, no JVD, no meningeal signs  Chest: Clear to auscultation bilaterally   CVS: S1 S2 heard, Capillary refill less than 2 seconds  Abd: soft/ Non tender, colostomy+  Ext: contracted  Neuro/Psych: . Non verbal, doesn't follow commands    Significant Diagnostic Studies:   12/6/2022: BUN 26 MG/DL (H; Ref range: 6 - 20 MG/DL); BUN 22 MG/DL (H; Ref range: 6 - 20 MG/DL); Calcium 7.8 MG/DL (L; Ref range: 8.5 - 10.1 MG/DL); Calcium 8.4 MG/DL (L; Ref range: 8.5 - 10.1 MG/DL); CO2 28 mmol/L (Ref range: 21 - 32 mmol/L); CO2 27 mmol/L (Ref range: 21 - 32 mmol/L); Creatinine 0.77 MG/DL (Ref range: 0.55 - 1.02 MG/DL); Creatinine 0.77 MG/DL (Ref range: 0.55 - 1.02 MG/DL); Glucose 114 mg/dL (H; Ref range: 65 - 100 mg/dL); Glucose 70 mg/dL (Ref range: 65 - 100 mg/dL); HCT 24.8 % (L; Ref range: 35.0 - 47.0 %); HCT 29.5 % (L; Ref range: 35.0 - 47.0 %); HGB 8.2 g/dL (L; Ref range: 11.5 - 16.0 g/dL); HGB 9.6 g/dL (L; Ref range: 11.5 - 16.0 g/dL); Potassium 4.0 mmol/L (Ref range: 3.5 - 5.1 mmol/L); Potassium 4.1 mmol/L (Ref range: 3.5 - 5.1 mmol/L); Sodium 141 mmol/L (Ref range: 136 - 145 mmol/L); Sodium 144 mmol/L (Ref range: 136 - 145 mmol/L)  12/7/2022: BUN 18 MG/DL (Ref range: 6 - 20 MG/DL); Calcium 8.9 MG/DL (Ref range: 8.5 - 10.1 MG/DL); CO2 26 mmol/L (Ref range: 21 - 32 mmol/L); Creatinine 0.58 MG/DL (Ref range: 0.55 - 1.02 MG/DL); Glucose 50 mg/dL (LL; Ref range: 65 - 100 mg/dL); HCT 27.9 % (L; Ref range: 35.0 - 47.0 %); HGB 9.3 g/dL (L; Ref range: 11.5 - 16.0 g/dL);  Potassium 4.3 mmol/L (Ref range: 3.5 - 5.1 mmol/L); Sodium 139 mmol/L (Ref range: 136 - 145 mmol/L)  Recent Labs     12/22/22  0904   WBC 11.1*   HGB 9.6*   HCT 31.6*   *     Recent Labs     12/22/22  0904      K 4.3      CO2 34*   BUN 49*   CREA 0.75   *   CA 9.3     No results for input(s): AP, TBIL, TP, ALB, GLOB, GGT, AML, LPSE in the last 72 hours. No lab exists for component: SGOT, GPT, AMYP, HLPSE  No results for input(s): INR, PTP, APTT, INREXT in the last 72 hours. No results for input(s): FE, TIBC, PSAT, FERR in the last 72 hours. No results for input(s): PH, PCO2, PO2 in the last 72 hours. No results for input(s): CPK, CKMB in the last 72 hours. No lab exists for component: TROPONINI  No components found for: [place]    Pertinent imaging studies:    Per EMR      Time spent on discharge related activities today greater than 30 minutes. Signed:  Diandra Nichols MD                 Hospitalist                 12/22/2022                 11:35 AM        Cc:  Afshin Goyal MD

## 2022-12-22 NOTE — DISCHARGE INSTRUCTIONS
Patient Discharge Instructions    Ashley Dennison / [id number] : 1965    Admitted 2022 Discharged: 2022 11:27 AM     Discharging provider: Fernando Franco MD. To contact this provider call [phone] and ask  to page, if not available ask for triage hospitalist to be paged. Primary care provider: @PCP@  . . . . . . . . . . . . . . . . . . . . . . . . . . . . . . . . . . . . . . . . . . . . . . . . . . . . . . . . . . . . . . . . . . . . . . . Bruno Chawla FINAL DIAGNOSES & HOSPITAL COURSE:    Aspiration pneumonia, resolved   --Dysphagia diet per speech, eating well   --s/p cefepime, s/p rocephin (ended 12/10, 5 total days)     Hematuria, stable   Acute on chronic anemia   - Hemoglobin during admission has been stable: ~8-9, however it is notably down from 2 months ago with hemoglobin of ~11 across two checks on  and .   - Nursing noted hematuria in araujo, araujo removed and straight cath obtained, noted to have large blood in UA but without evidence of infection   - Hemoglobin stable over past few days, but is notably down from 2 weeks ago when she was closer to 11 on two separate checks   - Urology consulted, unlikely to be 2/2 bleeding from traumatic araujo insertion   - Obtain hemoccult, ordered  - Hold anticoagulation, SCDs in place  -  order iron profiile/ferritin, B12, folate, stool for hemoccult blood     Positive blood culture without bacteremia, resolved   - 1/2 bottles positive for coag-negative Staph, likely contaminant given no leukocytosis and Biofire positive for Staph but not MRSA or MSSA, suspect staph epi   - Repeat blood cultures pending, thus far no growth to date     Pressure wounds, stable   Wound, Pressure Prevention & Skin Care Recommendations:    Minimize layers of linen/pads under patient to optimize support surface. 2.  Turn/reposition approximately every 2 hours and offload heels. Patient mobility team to assist with q2 turns.    3.  Manage moisture/ Keep skin folds clean and dry/minimize brief usage. 4.  Specialty bed: Prius air mattress ordered from [facility]. 5.  Red erythema to left hip, left foot, right knee and right great toe:  Apply Venelex TID and cover all areas with foam dressings. 6.  Left heel:  Every 3 days cleanse with saline; apply Xeroform and foam dressing. 7.  Right heel:  Protect with foam dressing. Acute on chronic hypotension, resolved  --Continue home midodrine    Psychomotor retardation  Seizure disorder  --Continue home depakote      Hypothyroidism   --Continue levothyroxine      FOLLOW-UP CARE RECOMMENDATIONS/TESTING/NURSING ORDERS:  PT/OT      DIET:  Dysphagia diet-pureed    ACTIVITY:  Activity as tolerated, PT/OT to evaluate and treat, and OOB for meals    APPOINTMENTS:  Follow-up with primary care provider,  [unfilled]  -  Please call to set up an appointment to be seen in  1 week     It is very important that you keep follow-up appointment(s). Bring discharge papers, medication list (and/or medication bottles) to follow-up appointments for review by outpatient provider(s). PENDING TEST RESULTS:  At the time of discharge the following test results are still pending: none . Please review these results as they become available. Specific symptoms to watch for: chest pain, shortness of breath, fever, chills, nausea, vomiting, diarrhea, change in mentation, falling, weakness, bleeding.           GOALS OF CARE:  X  Eventual return to home/independent/assisted living     Long term SNF      Hospice     No rehospitalization     Patient condition at discharge:   Functional status    Poor    X  Deconditioned      Independent   Cognition    Lucid     Forgetful (some sensescence)     Dementia   Catheters/lines (plus indication)    Becker     PICC      PEG         Code status    Full code    X  DNR         CHRONIC MEDICAL CONDITIONS:  [unfilled]      Information obtained by :   I understand that if any problems occur once I am at home I am to contact my physician. I understand and acknowledge receipt of the instructions indicated above.                                                                                                                                              Physician's or R.N.'s Signature                                                                  Date/Time                                                                                                                                              Patient or Representative Signature                                                          Date/Time

## 2022-12-22 NOTE — ED TRIAGE NOTES
[facility] refused pt so pt sent back here. Pt was refused for no bars on bed.   Pt has multiple skin abrasions

## 2022-12-22 NOTE — PROGRESS NOTES
Pt is sitting in the bed with even and unlabored respirations on room air. No signs of pain at this time. No distress noted. AVS was given to transport as well as all other paperwork. SBARR was given to [facility]. All questions were answered and phone number to unit was given to the RN. IV was removed with little bleeding noted. Belongings were collected and returned to the pt via pt belonging bags. Safety precautions are in place. Problem: Falls - Risk of  Goal: *Absence of Falls  Description: Document Katarina Ground Fall Risk and appropriate interventions in the flowsheet.   12/22/2022 1605 by Alex Black RN  Outcome: Resolved/Met  12/22/2022 1605 by Alex Black RN  Outcome: Progressing Towards Goal  Note: Fall Risk Interventions:       Mentation Interventions: Door open when patient unattended    Medication Interventions: Evaluate medications/consider consulting pharmacy    Elimination Interventions: Call light in reach              Problem: Patient Education: Go to Patient Education Activity  Goal: Patient/Family Education  12/22/2022 1605 by Alex Black RN  Outcome: Resolved/Met  12/22/2022 1605 by Alex Black RN  Outcome: Progressing Towards Goal     Problem: Hypotension  Goal: *Blood pressure within specified parameters  12/22/2022 1605 by Alex Black RN  Outcome: Resolved/Met  12/22/2022 1605 by Alex Black RN  Outcome: Progressing Towards Goal  Goal: *Fluid volume balance  12/22/2022 1605 by Alex Black RN  Outcome: Resolved/Met  12/22/2022 1605 by Alex Black RN  Outcome: Progressing Towards Goal  Goal: *Labs within defined limits  12/22/2022 1605 by Alex Black RN  Outcome: Resolved/Met  12/22/2022 1605 by Alex Black RN  Outcome: Progressing Towards Goal     Problem: Patient Education: Go to Patient Education Activity  Goal: Patient/Family Education  12/22/2022 0078 by Alex Black RN  Outcome: Resolved/Met  12/22/2022 1605 by Alex Black RN  Outcome: Progressing Towards Goal     Problem: Pressure Injury - Risk of  Goal: *Prevention of pressure injury  Description: Document Vazquez Scale and appropriate interventions in the flowsheet.   12/22/2022 1605 by Abhinav Patiño RN  Outcome: Resolved/Met  12/22/2022 1605 by Abhinav Patiño RN  Outcome: Progressing Towards Goal  Note: Pressure Injury Interventions:  Sensory Interventions: Assess changes in LOC, Assess need for specialty bed, Avoid rigorous massage over bony prominences    Moisture Interventions: Absorbent underpads, Apply protective barrier, creams and emollients, Assess need for specialty bed, Check for incontinence Q2 hours and as needed, Internal/External fecal devices    Activity Interventions: Assess need for specialty bed, Pressure redistribution bed/mattress(bed type)    Mobility Interventions: Assess need for specialty bed, Chair cushion, HOB 30 degrees or less, Pressure redistribution bed/mattress (bed type)    Nutrition Interventions: Document food/fluid/supplement intake, Offer support with meals,snacks and hydration    Friction and Shear Interventions: Apply protective barrier, creams and emollients, Feet elevated on foot rest, Foam dressings/transparent film/skin sealants                Problem: Patient Education: Go to Patient Education Activity  Goal: Patient/Family Education  12/22/2022 1605 by Abhinav Patiño RN  Outcome: Resolved/Met  12/22/2022 1605 by Abhinav Patiño RN  Outcome: Progressing Towards Goal     Problem: Patient Education: Go to Patient Education Activity  Goal: Patient/Family Education  12/22/2022 1605 by Abhinav Patiño RN  Outcome: Resolved/Met  12/22/2022 1605 by Abhinav Patiño RN  Outcome: Progressing Towards Goal

## 2022-12-22 NOTE — WOUND CARE
WOCN Note:      Follow up assessment of heels. Chart reviewed. Assessed in room 611  Admitted DX:  Hospital-acquired pneumonia       Past Medical History:   Diagnosis Date    Constipation      Hypotension      Psychomotor retardation       severe    Seizure disorder ([address]        Assessment:   Patient is alert and patient is repositioning herself in the bed. Found sitting up in the bed. Bed: prius air mattress     1. POA Left heel, Pressure Injury Stage 2/bullae ruptured with slow blanching pink base; 3 x 5 x 0.1 cm.   2. POA Right heel, Pressure Injury Stage 2 deflated bullae. 1 x 2 x 0 cm. Wound, Pressure Prevention & Skin Care Recommendations:    Minimize layers of linen/pads under patient to optimize support surface. 2.  Turn/reposition approximately every 2 hours and offload heels. Patient mobility team to assist with q2 turns. 3.  Manage moisture/ Keep skin folds clean and dry/minimize brief usage. 4.  Specialty bed: Prius air mattress ordered from [facility]. 5.  Heels:  Apply Venelex tid.      Transition of Care:   Plan to follow as needed while admitted to hospital.     Lobito Monroe, KRISTANN RN [employer]  [facility]  Available on Perfect [employer]  Office [phone]

## 2022-12-22 NOTE — PROGRESS NOTES
Transition of Care Plan to SNF/Rehab    SNF/Rehab Transition:  Patient has been accepted to [facility] and meets criteria for admission. Patient will transported by [facility] and expected to leave at 1pm.    Communication to Patient/Family:  Met with patient and dad Mr. Monse Brown on phone (identified care giver) and they are agreeable to the transition plan. Communication to SNF/Rehab:  Bedside RN, has been notified to update the transition plan to the facility and call report (phone number [phone]) going to room 913/02  Discharge information has been updated on the AVS.         [] BCPI-A  Patient has been identified as part of the BCPI-A Program.  For Care Coordination associated with that Bundle Program, please contact Bundle information has been communication to     Nursing Please include all hard scripts for controlled substances, med rec and dc summary, and AVS in packet. Reviewed and confirmed with facility, [facility],  can manage the patient care needs for the following:     SNF/Rehab Transition:  Patient to follow-up with Home Health:  [facility], other ,none  PCP/Specialist:     Franky with (X) only those applicable:    Medication:  [x]  Medications will be available at the facility  []  IV Antibiotics  []  Controlled Substance - hard copy to be sent with patient   [x]  Weekly Labs   Documents:  [x] Hard RX  [x] MAR  [x] Kardex  [x] AVS  [x]Transfer Summary  [x]Discharge   Equipment:  []  CPAP/BiPAP  []  Wound Vacuum  []  Becker or Urinary Device  []  PICC/Central Line  []  Nebulizer  []  Ventilator   Treatment:  []Isolation (for MRSA, VRE, etc.)  []Surgical Drain Management  []Tracheostomy Care  []Dressing Changes  []Dialysis with transportation and chair time   []PEG Care  []Oxygen  []Daily Weights for Heart Failure   Dietary:  []Any diet limitations  []Tube Feedings   []Total Parenteral Management (TPN)   Eligible for Medicaid Long Term Services and Supports  Yes:  [x] Eligible for medical assistance or will become eligible within 180 days and UAI completed. [] Provider/Patient and/or support system has requested screening. [x] UAI copy provided to patient or responsible party,   [] UAI unavailable at discharge will send once processed to SNF provider. [] UAI unavailable at discharged mailed to patient  No:   [] Private pay and is not financially eligible for Medicaid within the next 180 days. [] Reside out-of-state.   [] A residents of a state owned/operated facility that is licensed  by [facility] or [facility]  [] Enrollment in [facility] hospice services  []  [address]  [] Patient /Family declines to have screening completed or provide financial information for screening     Financial Resources:  Medicaid    [] Initiated and application pending   [] Full coverage     Advanced Care Plan:  []Surrogate Decision Maker of Care  []POA  []Communicated Code Status  (DDNR)   Other

## 2022-12-22 NOTE — PROGRESS NOTES
Pt is laying in bed with even and unlabored respirations on room air. No signs of pain noted. No distress noted. Plan is to send pt to facility tomorrow. Safety precautions are in place. Bed in low position and locked. Call bell within reach. Problem: Falls - Risk of  Goal: *Absence of Falls  Description: Document Kinney Blades Fall Risk and appropriate interventions in the flowsheet. Outcome: Progressing Towards Goal  Note: Fall Risk Interventions:       Mentation Interventions: Door open when patient unattended    Medication Interventions: Evaluate medications/consider consulting pharmacy    Elimination Interventions: Call light in reach              Problem: Patient Education: Go to Patient Education Activity  Goal: Patient/Family Education  Outcome: Progressing Towards Goal     Problem: Hypotension  Goal: *Blood pressure within specified parameters  Outcome: Progressing Towards Goal  Goal: *Fluid volume balance  Outcome: Progressing Towards Goal  Goal: *Labs within defined limits  Outcome: Progressing Towards Goal     Problem: Patient Education: Go to Patient Education Activity  Goal: Patient/Family Education  Outcome: Progressing Towards Goal     Problem: Pressure Injury - Risk of  Goal: *Prevention of pressure injury  Description: Document Vazquez Scale and appropriate interventions in the flowsheet.   Outcome: Progressing Towards Goal  Note: Pressure Injury Interventions:  Sensory Interventions: Assess changes in LOC, Assess need for specialty bed, Avoid rigorous massage over bony prominences    Moisture Interventions: Absorbent underpads, Apply protective barrier, creams and emollients, Assess need for specialty bed, Check for incontinence Q2 hours and as needed, Internal/External fecal devices    Activity Interventions: Pressure redistribution bed/mattress(bed type), Assess need for specialty bed    Mobility Interventions: Assess need for specialty bed, Chair cushion, HOB 30 degrees or less    Nutrition Interventions: Document food/fluid/supplement intake, Offer support with meals,snacks and hydration    Friction and Shear Interventions: Apply protective barrier, creams and emollients, Feet elevated on foot rest, Foam dressings/transparent film/skin sealants                Problem: Patient Education: Go to Patient Education Activity  Goal: Patient/Family Education  Outcome: Progressing Towards Goal     Problem: Patient Education: Go to Patient Education Activity  Goal: Patient/Family Education  Outcome: Progressing Towards Goal

## 2022-12-22 NOTE — ED PROVIDER NOTES
29-year-old female with history of constipation, hypertension, seizures, severe psychomotor retardation presents to the emergency department reportedly with inability of her facility to care for her. Patient is nonverbal at baseline and provides no history. She was discharged earlier today from the hospital and upon arrival at the facility there was concern that she would not be safe in bed due to fall risk. She was therefore transported back to the emergency department for further evaluation. The history is provided by medical records. Past Medical History:   Diagnosis Date    Constipation     Hypotension     Psychomotor retardation     severe    Seizure disorder [facility])        Past Surgical History:   Procedure Laterality Date    COLONOSCOPY N/A 11/18/2019    COLONOSCOPY performed by Josue Gonzalez MD at [facility]  11/18/2019              No family history on file.     Social History     Socioeconomic History    Marital status: UNKNOWN     Spouse name: Not on file    Number of children: Not on file    Years of education: Not on file    Highest education level: Not on file   Occupational History    Not on file   Tobacco Use    Smoking status: Never    Smokeless tobacco: Never   Substance and Sexual Activity    Alcohol use: Never    Drug use: Never    Sexual activity: Not on file   Other Topics Concern    Not on file   Social History Narrative    Not on file     Social Determinants of Health     Financial Resource Strain: Not on file   Food Insecurity: Not on file   Transportation Needs: Not on file   Physical Activity: Not on file   Stress: Not on file   Social Connections: Not on file   Intimate Partner Violence: Not on file   Housing Stability: Not on file         ALLERGIES: Sting, bee and Methsuximide    Review of Systems   Unable to perform ROS: Patient nonverbal     Vitals:    12/22/22 1818   BP: 109/71   Pulse: 73   Resp: 18   Temp: 97 °F (36.1 °C)   SpO2: 93% Physical Exam  Vitals and nursing note reviewed. Constitutional:       General: She is not in acute distress. Appearance: She is well-developed. She is ill-appearing (Chronically ill-appearing). She is not toxic-appearing or diaphoretic. HENT:      Head: Normocephalic and atraumatic. Nose: Nose normal.      Mouth/Throat:      Mouth: Mucous membranes are moist.      Pharynx: Oropharynx is clear. Eyes:      Extraocular Movements: Extraocular movements intact. Conjunctiva/sclera: Conjunctivae normal.      Pupils: Pupils are equal, round, and reactive to light. Cardiovascular:      Rate and Rhythm: Normal rate and regular rhythm. Pulses: Normal pulses. Heart sounds: Normal heart sounds. Pulmonary:      Effort: Pulmonary effort is normal. No respiratory distress. Breath sounds: Normal breath sounds. No wheezing. Chest:      Chest wall: No tenderness. Abdominal:      General: Abdomen is flat. There is no distension. Palpations: Abdomen is soft. Tenderness: There is no abdominal tenderness. There is no guarding or rebound. Musculoskeletal:         General: No swelling, tenderness, deformity or signs of injury. Normal range of motion. Cervical back: Normal range of motion and neck supple. No rigidity. No muscular tenderness. Right lower leg: No edema. Left lower leg: No edema. Skin:     General: Skin is warm and dry. Capillary Refill: Capillary refill takes less than 2 seconds. Findings: Bruising (Scattered bruises across the arms) present. Comments: Skin breakdown of the heels without evidence of infection   Psychiatric:         Behavior: Behavior normal.        MDM  Number of Diagnoses or Management Options  Diagnosis management comments: 62-year-old female presents as above. She was discharged earlier today from the hospital but was not left at the facility due to concerns about fall risk.   By the time she arrived back to the emergency department case management is unable to contact anyone to accept her at the facility. Plan to board in the ED overnight and reattempt tomorrow to place.            Procedures

## 2022-12-23 VITALS
HEART RATE: 64 BPM | RESPIRATION RATE: 18 BRPM | TEMPERATURE: 98.1 F | OXYGEN SATURATION: 97 % | SYSTOLIC BLOOD PRESSURE: 148 MMHG | DIASTOLIC BLOOD PRESSURE: 75 MMHG

## 2022-12-23 PROCEDURE — 74011250637 HC RX REV CODE- 250/637: Performed by: EMERGENCY MEDICINE

## 2022-12-23 RX ADMIN — DIVALPROEX SODIUM 250 MG: 250 TABLET, DELAYED RELEASE ORAL at 02:00

## 2022-12-23 NOTE — PROGRESS NOTES
Care Management:    Transition of Care Plan:       Disposition: [facility]  Transportation: Hospital to Home stretcher    9:22 AM Left message for Molly Basilio, admissions at [facility]. 9:53 AM Called Molly Basilio admissions at [facility]. They can accept the patient back at 1:00 PM.   100:00 AM Set up Hospital to Home stretcher for 1 PM. Notified ED  of time. 12:22 PM Spoke with patient's father to update him on the above.      EMILIANO Rosas

## 2022-12-23 NOTE — ED NOTES
8: 57 AM  Change of shift. Care of patient taken over from Dr. Nickolas Najjar; H&P reviewed, bedside handoff complete. Awaiting case management placement.       Can accept at [facility].    transferred

## 2022-12-29 ENCOUNTER — APPOINTMENT (OUTPATIENT)
Dept: INTERNAL MEDICINE CLINIC | Age: 57
End: 2022-12-29

## 2022-12-29 NOTE — PROGRESS NOTES
Physician Progress Note      Andre Mart  CSN #:                  [id number]  :                       1965  ADMIT DATE:       2022 8:27 AM  DISCH DATE:        2022 4:48 PM  RESPONDING  PROVIDER #:        David Cerda MD          QUERY TEXT:    Patient admitted with aspiration pneumonia . Noted documentation of sepsis. In order to support the diagnosis of sepsis, please include additional clinical indicators in your documentation. Or please document if the diagnosis of sepsis has been ruled out after further study. The medical record reflects the following:  Risk Factors: sepsis    Clinical Indicators:    Patient was noted to have HR >90 and hypotension. The ED Provider noted, \"presents via EMS from outside facility for admission for aspiration pneumonia and sepsis. \" H&P on  notes, \"Hypotension 2/2 sepsis. \" It also noted, \"PCXR was consistent with Multi focal Pneumonia on centrilobular emphysema and labs were notable for a PCT 0.29 and WBC 12.2. She was admitted to the facility there and became hypotensive requiring Levophed gtt. As they do not have an ICU she was transferred to [facility] for ICU level  care to ER pending bed availability. \" Patient was diagnosed with aspiration pneumonia and treated with Maxipime and Rocephin    Treatment:  dextrose 5% and 0.9% NaCl infusion  Rate: 75 mL/hr Dose: 75 mL/hr  cefTRIAXone (ROCEPHIN) 1 g in 0.9% sodium chloride 10 mL IV syringe  azithromycin (ZITHROMAX) 500 mg in 0.9% sodium chloride 250 mL IVPB  cefepime (MAXIPIME) injection 1 g    Thank you,  Jose Tate RN [facility]  Clinical Documentation  [phone] or via [employer]  Options provided:  -- Sepsis present as evidenced by, Please document evidence.   -- Sepsis was ruled out after study  -- Other - I will add my own diagnosis  -- Disagree - Not applicable / Not valid  -- Disagree - Clinically unable to determine / Unknown  -- Refer to Clinical Documentation Reviewer    PROVIDER RESPONSE TEXT:    Sepsis is present as evidenced by aspiration PNA    Query created by: Carlos Martell on 12/28/2022 9:29 AM      Electronically signed by:  Ariadna Reyes MD 12/29/2022 7:46 AM

## 2023-01-16 DIAGNOSIS — K59.00 CONSTIPATION, UNSPECIFIED CONSTIPATION TYPE: ICD-10-CM

## 2023-01-16 DIAGNOSIS — I95.0 IDIOPATHIC HYPOTENSION: ICD-10-CM

## 2023-01-16 DIAGNOSIS — G62.9 NEUROPATHY: ICD-10-CM

## 2023-01-16 DIAGNOSIS — E03.9 HYPOTHYROIDISM, UNSPECIFIED TYPE: Primary | ICD-10-CM

## 2023-01-16 DIAGNOSIS — G40.909 SEIZURE DISORDER (HCC): ICD-10-CM

## 2023-01-16 DIAGNOSIS — G47.00 INSOMNIA, UNSPECIFIED TYPE: ICD-10-CM

## 2023-01-16 RX ORDER — LANOLIN ALCOHOL/MO/W.PET/CERES
3 CREAM (GRAM) TOPICAL
Qty: 30 TABLET | Refills: 0 | Status: SHIPPED | OUTPATIENT
Start: 2023-01-16

## 2023-01-16 RX ORDER — DOCUSATE SODIUM 100 MG/1
100 CAPSULE, LIQUID FILLED ORAL 2 TIMES DAILY
Qty: 60 CAPSULE | Refills: 2 | Status: SHIPPED | OUTPATIENT
Start: 2023-01-16 | End: 2023-04-16

## 2023-01-16 RX ORDER — LEVOTHYROXINE SODIUM 50 UG/1
50 TABLET ORAL
Qty: 90 TABLET | Refills: 4 | Status: SHIPPED | OUTPATIENT
Start: 2023-01-16

## 2023-01-16 RX ORDER — MIDODRINE HYDROCHLORIDE 10 MG/1
TABLET ORAL
Qty: 60 TABLET | Refills: 0 | Status: SHIPPED | OUTPATIENT
Start: 2023-01-16

## 2023-01-16 RX ORDER — DIVALPROEX SODIUM 250 MG/1
250 TABLET, DELAYED RELEASE ORAL 3 TIMES DAILY
Qty: 90 TABLET | Refills: 0 | Status: SHIPPED | OUTPATIENT
Start: 2023-01-16

## 2023-01-16 RX ORDER — GABAPENTIN 600 MG/1
1200 TABLET ORAL 3 TIMES DAILY
Qty: 180 TABLET | Refills: 0 | Status: SHIPPED | OUTPATIENT
Start: 2023-01-16

## 2023-02-16 NOTE — ED TRIAGE NOTES
Pt BIBA from , per EMS  states pt orientation level is \"off\". Pt is non verbal and bedbound. Pt resting contently in bed at this time.
No

## 2023-03-01 ENCOUNTER — HOSPITAL ENCOUNTER (OUTPATIENT)
Dept: CT IMAGING | Age: 58
Discharge: HOME | End: 2023-03-01
Attending: FAMILY MEDICINE

## 2023-03-01 ENCOUNTER — HOSPITAL ENCOUNTER (EMERGENCY)
Age: 58
Discharge: HOME | End: 2023-03-01
Attending: EMERGENCY MEDICINE
Payer: MEDICARE

## 2023-03-01 ENCOUNTER — HOSPITAL ENCOUNTER (OUTPATIENT)
Dept: MRI IMAGING | Age: 58
Discharge: HOME | End: 2023-03-01
Attending: FAMILY MEDICINE

## 2023-03-01 ENCOUNTER — APPOINTMENT (OUTPATIENT)
Dept: CT IMAGING | Age: 58
End: 2023-03-01
Attending: EMERGENCY MEDICINE
Payer: MEDICARE

## 2023-03-01 VITALS
DIASTOLIC BLOOD PRESSURE: 65 MMHG | RESPIRATION RATE: 16 BRPM | HEART RATE: 74 BPM | OXYGEN SATURATION: 98 % | TEMPERATURE: 98 F | SYSTOLIC BLOOD PRESSURE: 123 MMHG

## 2023-03-01 DIAGNOSIS — S91.302A NON HEALING LEFT HEEL WOUND: Primary | ICD-10-CM

## 2023-03-01 DIAGNOSIS — L97.401 SKIN ULCER OF HEEL, LIMITED TO BREAKDOWN OF SKIN, UNSPECIFIED LATERALITY (HCC): ICD-10-CM

## 2023-03-01 DIAGNOSIS — L03.119 CELLULITIS OF LOWER EXTREMITY, UNSPECIFIED LATERALITY: ICD-10-CM

## 2023-03-01 PROCEDURE — 99284 EMERGENCY DEPT VISIT MOD MDM: CPT

## 2023-03-01 PROCEDURE — 73700 CT LOWER EXTREMITY W/O DYE: CPT

## 2023-03-01 RX ORDER — LORAZEPAM 2 MG/ML
2 INJECTION, SOLUTION INTRAMUSCULAR; INTRAVENOUS AS NEEDED
Status: DISCONTINUED | OUTPATIENT
Start: 2023-03-01 | End: 2023-03-01

## 2023-03-01 NOTE — ED TRIAGE NOTES
Patient arrives to ED in home wheelchair accompanied by caregiver, caregiver reports patient was scheduled for MRI of left foot today to r/o osteomyelitis at [facility], unable to get MRI done due to patient unable to lay still. PCP referred to ED to get MRI. Caregiver reports patient has had a sore to left foot for \"weeks\", states patient has been on 3 different antibiotics but would hasn't healed.

## 2023-03-01 NOTE — DISCHARGE INSTRUCTIONS
I recommend you continue the current antibiotics that days he is getting. I have provided you with multiple numbers to call podiatry. Keep your follow-up appointment with Dr. Laura Arriaga tomorrow and return with any new or worsening symptoms.

## 2023-03-01 NOTE — ED PROVIDER NOTES
HPI   This is a 61-year-old woman with a past medical history of severe psychomotor retardation and seizures who presents to the emergency department due to a chronic left foot wound. Patient has had this wound for several months now. There are images in the chart from December showing that she had a wound that had blistered on her left heel. This has been slowly healing. She was initially on doxycycline which was prescribed by her PCP. According to her caregivers that her her bedside this wound has continued to heal, though it is done so slowly. They state at one point it was draining but this has not happened in some time. After being on doxycycline her PCP recently put her on Bactrim and Keflex which she is currently taking. Due to the slow nature of the healing of the wound, her PCP has been scheduling imaging including an MRI and a CT of the left foot. It sounds like there is concern of potential osteomyelitis. Other than the foot wound, the patient's caregiver states that she has been in her baseline state of health. There has been no change in her behavior. There is not been any spreading redness around the wound. No fever. Apparently she was at [facility] today to get her scheduled MRI but was unable to do show because she cannot lay still for long periods of time. It seems that her PCP told her caregivers to take her to [facility]. Dr. Stephen Frederick from [facility] was notified about the patient's pending arrival but her caregivers then took her to [facility]. No other history can be obtained at this time given the patient's inability to provide a history.     Past Medical History:   Diagnosis Date    Constipation     Hypotension     Psychomotor retardation     severe    Seizure disorder [facility])        Past Surgical History:   Procedure Laterality Date    COLONOSCOPY N/A 11/18/2019    COLONOSCOPY performed by Gelacio Sullivan MD at [place] [address] 11/18/2019              No family history on file. Social History     Socioeconomic History    Marital status: SINGLE     Spouse name: Not on file    Number of children: Not on file    Years of education: Not on file    Highest education level: Not on file   Occupational History    Not on file   Tobacco Use    Smoking status: Never    Smokeless tobacco: Never   Substance and Sexual Activity    Alcohol use: Never    Drug use: Never    Sexual activity: Not on file   Other Topics Concern    Not on file   Social History Narrative    Not on file     Social Determinants of Health     Financial Resource Strain: Not on file   Food Insecurity: Not on file   Transportation Needs: Not on file   Physical Activity: Not on file   Stress: Not on file   Social Connections: Not on file   Intimate Partner Violence: Not on file   Housing Stability: Not on file         ALLERGIES: Sting, bee and Methsuximide    Review of Systems  Unable to perform a complete review of systems secondary to the patient's baseline cognitive dysfunction  Vitals:    03/01/23 1334   BP: 123/65   Pulse: 74   Resp: 16   Temp: 98 °F (36.7 °C)   SpO2: 98%            Physical Exam  Constitutional:       Comments: Chronically ill-appearing. Not acutely distressed   HENT:      Mouth/Throat:      Comments: Moist mucous membranes  Eyes:      General: No scleral icterus. Extraocular Movements: Extraocular movements intact. Neck:      Comments: Trachea midline  Cardiovascular:      Comments: Regular rate and rhythm. 2+ left DP pulse. Brisk capillary refill in all the toes of the left foot  Pulmonary:      Effort: No respiratory distress. Musculoskeletal:         General: No deformity. Normal range of motion. Comments: There is a superficial wound on the left heel. There is scaly skin overlying the wound with minimal surrounding erythema. There is no warmth, underlying fluctuance, tenderness to palpation, or drainage from the wound.   The wound does not probe down into the subcutaneous tissues. Wound measures approximately 1.5 x 1.5 cm   Skin:     General: Skin is warm and dry. Neurological:      Comments: Awake. Alert. Nonverbal.  Moving all extremities spontaneously        Medical Decision Making  Amount and/or Complexity of Data Reviewed  Radiology: ordered. 73-year-old woman presenting with the above chief complaint. She has stable vital signs. I was able to review the images in the chart of the foot wound and compared to today this wound appears to have improved significantly. It has a scaly base with minimal surrounding erythema. There is no underlying fluctuance. There is no warmth. It does not seem to cause the patient pain. There is no drainage. It does not probe into the subcutaneous tissues. It seems that the caregivers and her PCP are doing a great job in terms of treating the wound, and she is also getting wound care. I did try to obtain a noncontrasted CT of the foot, but this was not really diagnostic secondary to the patient being unable to lay still for long periods of time. 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 Given how far the wound is, in terms of healing and her exam I have a low suspicion for underlying osteomyelitis. Given the potential risk of sedating the patient to get an imaging study, I do not feel the risks outweigh the benefits of getting further imaging given my low suspicion for osteomyelitis. I discussed this with the patient's father who caregivers report is her primary guardian over the phone. He was understandable and in agreement with the plan that the patient should continue her current antibiotics and follow-up with podiatry. I attempted to contact the patient's PCP Dr. Buzz Beltrán but was only able to leave a message. I provided multiple numbers for podiatry for the patient's caregivers to set up an appointment with.   They are understanding of the plan going forward and reasons to return. Patient discharged in stable condition.        Procedures

## 2023-10-05 PROBLEM — G82.20 PARAPLEGIA (HCC): Status: ACTIVE | Noted: 2023-10-05

## 2023-10-05 PROBLEM — E46 PROTEIN-CALORIE MALNUTRITION (HCC): Status: ACTIVE | Noted: 2023-10-05

## 2024-07-29 ENCOUNTER — OFFICE VISIT (OUTPATIENT)
Age: 59
End: 2024-07-29
Payer: MEDICAID

## 2024-07-29 VITALS — HEART RATE: 92 BPM | RESPIRATION RATE: 14 BRPM | DIASTOLIC BLOOD PRESSURE: 54 MMHG | SYSTOLIC BLOOD PRESSURE: 97 MMHG

## 2024-07-29 DIAGNOSIS — G40.909 NONINTRACTABLE EPILEPSY WITHOUT STATUS EPILEPTICUS, UNSPECIFIED EPILEPSY TYPE (HCC): Primary | ICD-10-CM

## 2024-07-29 PROCEDURE — 99204 OFFICE O/P NEW MOD 45 MIN: CPT | Performed by: PSYCHIATRY & NEUROLOGY

## 2024-07-29 RX ORDER — DIVALPROEX SODIUM 250 MG/1
250 TABLET, DELAYED RELEASE ORAL 3 TIMES DAILY
Qty: 90 TABLET | Refills: 11 | Status: SHIPPED | OUTPATIENT
Start: 2024-07-29

## 2024-07-29 RX ORDER — DOCUSATE SODIUM 100 MG/1
100 CAPSULE, LIQUID FILLED ORAL 2 TIMES DAILY
COMMUNITY

## 2024-07-29 RX ORDER — GUAIFENESIN AND DEXTROMETHORPHAN HYDROBROMIDE 100; 10 MG/5ML; MG/5ML
5 SOLUTION ORAL EVERY 4 HOURS PRN
COMMUNITY

## 2024-07-29 RX ORDER — ACETAMINOPHEN 325 MG/1
650 TABLET ORAL EVERY 6 HOURS PRN
COMMUNITY

## 2024-07-29 NOTE — PATIENT INSTRUCTIONS
telephone to review results.  If a follow-up appointment to review results has not been scheduled, Our [facility] office can be reached at [phone].  For appointments at our [place] or [place] office, please call [phone].       PRESCRIPTION REFILL POLICY    [facility]   Statement to Patients  April 1, 2014      In an effort to ensure the large volume of patient prescription refills is processed in the most efficient and expeditious manner, we are asking our patients to assist us by calling your Pharmacy for all prescription refills, this will include also your  Mail Order Pharmacy. The pharmacy will contact our office electronically to continue the refill process.    Please do not wait until the last minute to call your pharmacy. We need at least 48 hours (2days) to fill prescriptions. We also encourage you to call your pharmacy before going to  your prescription to make sure it is ready.     With regard to controlled substance prescription refill requests (narcotic refills) that need to be picked up at our office, we ask your cooperation by providing us with at least 72 hours (3days) notice that you will need a refill.    We will not refill narcotic prescription refill requests after 4:00pm on any weekday, Monday through Thursday, or after 2:00pm on Fridays, or on the weekends.      We encourage everyone to explore another way of getting your prescription refill request processed using [employer], our patient web portal through our electronic medical record system. [employer] is an efficient and effective way to communicate your medication request directly to the office and  downloadable as an bright on your smart phone . [employer] also features a review functionality that allows you to view your medication list as well as leave messages for your physician. Are you ready to get connected? If so please review the attatched instructions or speak to any of our staff

## 2024-07-29 NOTE — PROGRESS NOTES
[facility] at [facility] at [address] [place] [address] [place] [address] [facility] [address] [place] [address] [phone] Office  [phone] Facsimile           Referring:     Chief Complaint   Patient presents with    New Patient     Presents with [facility] Toribio with [facility].  Previously resided with [facility].    Seizures     Previously under the care of [facility]  Has not had any sz since moving under their care in Jan 2024.  Last known sz was around July 2022   59-year-old lady presenting today with her caregiver from her residential home for evaluation regarding epilepsy.  Last known seizure was 2022.  She has been at her current facility since January of this year.  No history is known regarding her epilepsy except that she has been well-controlled on Depakote.  The patient can provide no history.  She is able to ambulate short distances with a gait belt.  She does good day support.  She eats well.  She sleeps well     Record review finds office visit with Dr. Subramanian down to the [facility] where she was said to have uncharacterized epilepsy, intellectual disability cerebral palsy nonverbal.    Last Seizure  July 2022    Previous antiseizure medications  Unknown    Current antiseizure medications  Depakote 250 mg tablet--1 tablet 3 times daily    Epilepsy etiology  Cerebral palsy    Seizure types   Unknown    Seizure risk factors  Family history of seizure/epilepsy--unknown unknown  CNS infections--unknown  Head trauma with loss of consciousness--unknown  Febrile convulsions--unknown    Diagnostics  EEG 2012 [facility] diffuse slowing  EEG 2021 [facility] said to demonstrate a probably normal routine EEG although alpha rhythm is not noted in terms of frequency  Head CT dry December 5, 2022 for indication altered mental status unremarkable    Most recent laboratory analysis  April

## (undated) DEVICE — NEONATAL-ADULT SPO2 SENSOR: Brand: NELLCOR

## (undated) DEVICE — BASIN EMSIS 16OZ GRAPHITE PLAS KID SHP MOLD GRAD FOR ORAL

## (undated) DEVICE — SYR 10ML LUER LOK 1/5ML GRAD --

## (undated) DEVICE — CATH IV AUTOGRD BC BLU 22GA 25 -- INSYTE

## (undated) DEVICE — Device

## (undated) DEVICE — SOLIDIFIER MEDC 1200ML -- CONVERT TO 356117

## (undated) DEVICE — NEEDLE HYPO 18GA L1.5IN PNK S STL HUB POLYPR SHLD REG BVL

## (undated) DEVICE — Z DISCONTINUED PER MEDLINE LINE GAS SAMPLING O2/CO2 LNG AD 13 FT NSL W/ TBNG FILTERLINE

## (undated) DEVICE — SET ADMIN 16ML TBNG L100IN 2 Y INJ SITE IV PIGGY BK DISP

## (undated) DEVICE — SYR 3ML LL TIP 1/10ML GRAD --

## (undated) DEVICE — TOWEL 4 PLY TISS 19X30 SUE WHT

## (undated) DEVICE — 1200 GUARD II KIT W/5MM TUBE W/O VAC TUBE: Brand: GUARDIAN

## (undated) DEVICE — ELECTRODE,RADIOTRANSLUCENT,FOAM,5PK: Brand: MEDLINE